# Patient Record
Sex: FEMALE | Race: WHITE | NOT HISPANIC OR LATINO | Employment: FULL TIME | URBAN - METROPOLITAN AREA
[De-identification: names, ages, dates, MRNs, and addresses within clinical notes are randomized per-mention and may not be internally consistent; named-entity substitution may affect disease eponyms.]

---

## 2017-03-27 ENCOUNTER — ALLIED HEALTH/NURSE VISIT (OUTPATIENT)
Dept: CARDIOLOGY | Facility: CLINIC | Age: 62
End: 2017-03-27
Payer: COMMERCIAL

## 2017-03-27 DIAGNOSIS — Z95.0 CARDIAC PACEMAKER IN SITU: Primary | ICD-10-CM

## 2017-03-27 PROCEDURE — 93294 REM INTERROG EVL PM/LDLS PM: CPT | Performed by: INTERNAL MEDICINE

## 2017-03-27 PROCEDURE — 93296 REM INTERROG EVL PM/IDS: CPT | Performed by: INTERNAL MEDICINE

## 2017-03-27 NOTE — MR AVS SNAPSHOT
After Visit Summary   3/27/2017    Tatiana Skelton    MRN: [id number]           Patient Information     Date Of Birth          1955        Visit Information        Provider Department      3/27/2017 4:15 PM JO TECH1 [facility]        Today's Diagnoses     Cardiac pacemaker in situ    -  1       Follow-ups after your visit        Your next 10 appointments already scheduled     Mar 27, 2017  4:15 PM CDT   Remote PPM Check with JO TECH1   [facility] ([facility])    [address] [place] [id number] [phone]           This appointment is for a remote check of your pacemaker.  This is not an appointment at the office.              Who to contact     If you have questions or need follow up information about today's clinic visit or your schedule please contact [facility] directly at [phone].  Normal or non-critical lab and imaging results will be communicated to you by Wikidothart, letter or phone within 4 business days after the clinic has received the results. If you do not hear from us within 7 days, please contact the clinic through Wikidothart or phone. If you have a critical or abnormal lab result, we will notify you by phone as soon as possible.  Submit refill requests through [employer] or call your pharmacy and they will forward the refill request to us. Please allow 3 business days for your refill to be completed.          Additional Information About Your Visit        MyChart Information     [employer] gives you secure access to your electronic health record. If you see a primary care provider, you can also send messages to your care team and make appointments. If you have questions, please call your primary care clinic.  If you do not have a primary care provider, please call [phone] and they will assist you.        Care EveryWhere ID      This is your Care EveryWhere ID. This could be used by other organizations to access your [facility] medical records  [id number]         Blood Pressure from Last 3 Encounters:   12/07/16 106/62   12/05/16 116/68   05/10/16 100/60    Weight from Last 3 Encounters:   12/07/16 69.4 kg (153 lb)   12/05/16 69.6 kg (153 lb 6.4 oz)   07/25/16 66.2 kg (146 lb)              We Performed the Following     INTERROGATION DEVICE EVAL REMOTE, PACER/ICD (42273)     PM DEVICE INTERROGATE REMOTE (60288)        Primary Care Provider Office Phone # Fax #    Darin Patten PA-C [phone]       [facility] [address] [facility] [address]        Thank you!     Thank you for choosing [facility] [place]  for your care. Our goal is always to provide you with excellent care. Hearing back from our patients is one way we can continue to improve our services. Please take a few minutes to complete the written survey that you may receive in the mail after your visit with us. Thank you!             Your Updated Medication List - Protect others around you: Learn how to safely use, store and throw away your medicines at www.disposemymeds.org.          This list is accurate as of: 3/27/17  2:54 PM.  Always use your most recent med list.                   Brand Name Dispense Instructions for use    ASPIRIN PO      Take 81 mg by mouth daily       calcium carbonate 500 MG tablet    OS-SONIA 500 mg [place]. [place]     Take 500 mg by mouth 2 times daily       cholecalciferol 1000 UNIT tablet    vitamin D     Take 1 tablet (1,000 Units) by mouth daily       mometasone 0.1 % ointment    ELOCON    45 g    Apply sparingly to affected area twice daily as needed.  Do not apply to face.       OMEGA-3 FISH OIL PO          QC WOMENS DAILY MULTIvitamin Tabs          UNABLE TO FIND      MEDICATION NAME: Cranberry tablets

## 2017-03-27 NOTE — PROGRESS NOTES
Medtronic Adapta (D) Remote PPM Device Check  AP: 2 % : <1 %  Mode: AAIR<->DDDR        Presenting Rhythm: AS/VS, rates 80-92bpm  Heart Rate: Adequate rates per histogram  Sensing: Stable    Pacing Threshold: Stable    Impedance: Stable  Battery Status: 11-15 years  Atrial Arrhythmia: 21 mode switch episodes comprising <1% of the time. 1 EGM shows As=Vs for PAT. Longest episode lasted 1 minute 48 seconds. Ventricular rates 60-160bpm with rates >100bpm 60% of the time during mode switch.    Ventricular Arrhythmia: None     Care Plan: F/u PPM Carelink q 3 months. Gave patient results over the phone. Zay,CVT

## 2017-05-08 ENCOUNTER — TRANSFERRED RECORDS (OUTPATIENT)
Dept: HEALTH INFORMATION MANAGEMENT | Facility: CLINIC | Age: 62
End: 2017-05-08

## 2017-06-02 ENCOUNTER — HEALTH MAINTENANCE LETTER (OUTPATIENT)
Age: 62
End: 2017-06-02

## 2017-07-12 ENCOUNTER — DOCUMENTATION ONLY (OUTPATIENT)
Dept: CARDIOLOGY | Facility: CLINIC | Age: 62
End: 2017-07-12

## 2017-07-12 NOTE — PROGRESS NOTES
Patient missed Carelink transmission on 7/3/17. Sent letter 7/5, no response. Sent 1 week letter today

## 2017-10-02 ENCOUNTER — TELEPHONE (OUTPATIENT)
Dept: FAMILY MEDICINE | Facility: OTHER | Age: 62
End: 2017-10-02

## 2017-10-02 NOTE — LETTER
[facility]-[phone]  Mkrerl-[phone]      October 2, 2017    Tatiana Skelton  [address] [address] [place] [address]    Dear Tatiana,    I care about your health and have reviewed your health plan. I have reviewed your medical conditions, medication list, and lab results and am making recommendations based on this review, to better manage your health.    You are in particular need of attention regarding:  -Breast Cancer Screening  -Cervical Cancer Screening  -Wellness (Physical) Visit     I am recommending that you:  -schedule a WELLNESS (Physical) APPOINTMENT with me.       -schedule a MAMMOGRAM which is due.    1 in 8 women will develop invasive breast cancer during her lifetime and it is the most common non-skin cancer in American women.  EARLY detection, new treatments, and a better understanding of the disease have increased survival rates - the 5 year survival rate in the 1960s was 63% and today it is close to 90%.    If you are under/uninsured, we recommend you contact the [facility]. They offer mammograms at no charge or on a sliding fee charge. You can schedule with them at [phone]. Please have them send us the results.      Please disregard this reminder if you have had this exam elsewhere within the last year.  It would be helpful for us to have a copy of your mammogram report in your file so that we can best coordinate your care - please contact us with when your test was done so we can update your record.             -schedule a PAP SMEAR EXAM which is due.  Please disregard this reminder if you have had this exam elsewhere within the last year.  It would be helpful for us to have a copy of your recent pap smear report in our file so that we can best coordinate your care.    If you are under/uninsured, we recommend you contact the [facility]. They offer pap smears at no charge or on a sliding fee charge. You can schedule with them at  [phone]. Please have them send us the results.      Here is a list of Health Maintenance topics that are due now or due soon:  Health Maintenance Due   Topic Date Due     YOAN QUESTIONNAIRE 1 YEAR  06/23/1975     Hepatitis C Screening  06/23/1975     Discuss Advance Directive Planning  06/23/2012     Eye Exam - yearly  10/22/2014     Depression Assessment - yearly  08/03/2016     Mammogram - every 2 years  09/26/2016     Pap Smear (diagnostic) - yearly  05/02/2017     Flu Vaccine - yearly  09/01/2017       Please call us at [phone] (or use [employer]) to address the above recommendations.     Thank you for trusting [facility] and we appreciate the opportunity to serve you.  We look forward to supporting your healthcare needs in the future.    Healthy Regards,    [facility]

## 2017-10-02 NOTE — TELEPHONE ENCOUNTER
Panel Management Review      Patient has the following on her problem list: None      Composite cancer screening  Chart review shows that this patient is due/due soon for the following Pap Smear and Mammogram  Summary:    Patient is due/failing the following:   MAMMOGRAM, PAP and PHQ9    Action needed:   Patient needs office visit for physical w/pap, mammo, PHQ-9, YOAN.    Type of outreach:    [facility]. [facility]    Questions for provider review:    None                                                                                                                                    ................Anoop Sebastian LPN,   October 2, 2017,      7:39 AM,   [facility]      Chart routed to closed .

## 2017-11-08 ENCOUNTER — TELEPHONE (OUTPATIENT)
Dept: FAMILY MEDICINE | Facility: OTHER | Age: 62
End: 2017-11-08

## 2017-11-08 NOTE — TELEPHONE ENCOUNTER
Pt is past due for f/u pap smear since failing to do recommended CKC.  Reminder letter was sent 03/15/17.  LMTC and schedule at [facility].  Left this writer's number in case of questions ([phone]).  If no reply and/or appt within 2 weeks (11/22/17) pt will be considered lost to pap tracking f/u.  Crista Crandall,    Pap Tracking

## 2017-11-10 ENCOUNTER — HEALTH MAINTENANCE LETTER (OUTPATIENT)
Age: 62
End: 2017-11-10

## 2017-11-27 ENCOUNTER — OFFICE VISIT (OUTPATIENT)
Dept: FAMILY MEDICINE | Facility: OTHER | Age: 62
End: 2017-11-27
Payer: COMMERCIAL

## 2017-11-27 VITALS
OXYGEN SATURATION: 97 % | SYSTOLIC BLOOD PRESSURE: 106 MMHG | BODY MASS INDEX: 20.86 KG/M2 | HEIGHT: 69 IN | TEMPERATURE: 98.5 F | HEART RATE: 89 BPM | RESPIRATION RATE: 20 BRPM | DIASTOLIC BLOOD PRESSURE: 78 MMHG | WEIGHT: 140.8 LBS

## 2017-11-27 DIAGNOSIS — Z12.4 SCREENING FOR CERVICAL CANCER: ICD-10-CM

## 2017-11-27 DIAGNOSIS — Z00.00 ROUTINE GENERAL MEDICAL EXAMINATION AT A HEALTH CARE FACILITY: Primary | ICD-10-CM

## 2017-11-27 DIAGNOSIS — Z13.1 SCREENING FOR DIABETES MELLITUS: ICD-10-CM

## 2017-11-27 DIAGNOSIS — Z23 NEED FOR PROPHYLACTIC VACCINATION AND INOCULATION AGAINST INFLUENZA: ICD-10-CM

## 2017-11-27 DIAGNOSIS — M54.2 NECK PAIN: ICD-10-CM

## 2017-11-27 DIAGNOSIS — G89.29 CHRONIC LEFT SHOULDER PAIN: ICD-10-CM

## 2017-11-27 DIAGNOSIS — M25.512 CHRONIC LEFT SHOULDER PAIN: ICD-10-CM

## 2017-11-27 DIAGNOSIS — Z11.59 NEED FOR HEPATITIS C SCREENING TEST: ICD-10-CM

## 2017-11-27 DIAGNOSIS — E78.5 HYPERLIPIDEMIA LDL GOAL <100: ICD-10-CM

## 2017-11-27 PROCEDURE — 99396 PREV VISIT EST AGE 40-64: CPT | Mod: 25 | Performed by: NURSE PRACTITIONER

## 2017-11-27 PROCEDURE — G0476 HPV COMBO ASSAY CA SCREEN: HCPCS | Performed by: NURSE PRACTITIONER

## 2017-11-27 PROCEDURE — 90686 IIV4 VACC NO PRSV 0.5 ML IM: CPT | Performed by: NURSE PRACTITIONER

## 2017-11-27 PROCEDURE — 90471 IMMUNIZATION ADMIN: CPT | Performed by: NURSE PRACTITIONER

## 2017-11-27 PROCEDURE — 87624 HPV HI-RISK TYP POOLED RSLT: CPT | Performed by: NURSE PRACTITIONER

## 2017-11-27 PROCEDURE — G0145 SCR C/V CYTO,THINLAYER,RESCR: HCPCS | Performed by: NURSE PRACTITIONER

## 2017-11-27 ASSESSMENT — PATIENT HEALTH QUESTIONNAIRE - PHQ9
SUM OF ALL RESPONSES TO PHQ QUESTIONS 1-9: 17
SUM OF ALL RESPONSES TO PHQ QUESTIONS 1-9: 17
10. IF YOU CHECKED OFF ANY PROBLEMS, HOW DIFFICULT HAVE THESE PROBLEMS MADE IT FOR YOU TO DO YOUR WORK, TAKE CARE OF THINGS AT HOME, OR GET ALONG WITH OTHER PEOPLE: SOMEWHAT DIFFICULT
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
## 2017-11-27 NOTE — LETTER
December 8, 2017    Tatiana Skelton  [address] [address] [place] [address]    Dear Tatiana,  We are happy to inform you that your PAP smear result from 11/27/17 is normal.  We are now able to do a follow up test on PAP smears. The DNA test is for HPV (Human Papilloma Virus). Cervical cancer is closely linked with certain types of HPV. Your result showed no evidence of high risk HPV.  Therefore we recommend you return in 1 year for your next pap smear and HPV test.  You will also need to return to the clinic every year for an annual exam and other preventive tests.  Please contact the clinic at [phone] with any questions.  Sincerely,    HERMILA Rasheed CNP/[facility]

## 2017-11-27 NOTE — NURSING NOTE
Prior to injection verified patient identity using patient's name and date of birth.  Per orders of Meryl Dye APRN CNP , injection(s) given by Anoop Sebastian LPN. Patient instructed to remain in clinic for 15 minutes afterwards, and to report any adverse reaction to me immediately. VIS given.  ................Anoop Sebastian LPN,   November 27, 2017,      4:33 PM,   [facility]

## 2017-11-27 NOTE — NURSING NOTE
"Chief Complaint   Patient presents with     Physical     Health Maintenance     pap, flu shot, edie, phq-9       Initial /78  Pulse 89  Temp 98.5  F (36.9  C) (Tympanic)  Resp 20  Ht 5' 8.5\" (1.74 m)  Wt 140 lb 12.8 oz (63.9 kg)  SpO2 97%  Breastfeeding? No  BMI 21.1 kg/m2 Estimated body mass index is 21.1 kg/(m^2) as calculated from the following:    Height as of this encounter: 5' 8.5\" (1.74 m).    Weight as of this encounter: 140 lb 12.8 oz (63.9 kg).  Medication Reconciliation: complete   ................Anoop Sebastian LPN,   November 27, 2017,      3:44 PM,   [facility]    "

## 2017-11-27 NOTE — PATIENT INSTRUCTIONS
The results of the pap test take about 2 weeks to come back.  We will send a letter with these results and the recommendations for further pap tests in the future.     Come back for fasting labs.     Have your mammogram in [place].     See Dr. Marte in [place] about your neck and shoulder       Preventive Health Recommendations  Female Ages 50 - 64    Yearly exam: See your health care provider every year in order to  o Review health changes.   o Discuss preventive care.    o Review your medicines if your doctor has prescribed any.      Get a Pap test every three years (unless you have an abnormal result and your provider advises testing more often).    If you get Pap tests with HPV test, you only need to test every 5 years, unless you have an abnormal result.     You do not need a Pap test if your uterus was removed (hysterectomy) and you have not had cancer.    You should be tested each year for STDs (sexually transmitted diseases) if you're at risk.     Have a mammogram every 1 to 2 years.    Have a colonoscopy at age 50, or have a yearly FIT test (stool test). These exams screen for colon cancer.      Have a cholesterol test every 5 years, or more often if advised.    Have a diabetes test (fasting glucose) every three years. If you are at risk for diabetes, you should have this test more often.     If you are at risk for osteoporosis (brittle bone disease), think about having a bone density scan (DEXA).    Shots: Get a flu shot each year. Get a tetanus shot every 10 years.    Nutrition:     Eat at least 5 servings of fruits and vegetables each day.    Eat whole-grain bread, whole-wheat pasta and brown rice instead of white grains and rice.    Talk to your provider about Calcium and Vitamin D.     Lifestyle    Exercise at least 150 minutes a week (30 minutes a day, 5 days a week). This will help you control your weight and prevent disease.    Limit alcohol to one drink per day.    No smoking.     Wear  sunscreen to prevent skin cancer.     See your dentist every six months for an exam and cleaning.    See your eye doctor every 1 to 2 years.

## 2017-11-27 NOTE — MR AVS SNAPSHOT
After Visit Summary   11/27/2017    Tatiana Skelton    MRN: [id number]           Patient Information     Date Of Birth          1955        Visit Information        Provider Department      11/27/2017 3:40 PM Meryl Dye APRN [facility]        Today's Diagnoses     Routine general medical examination at a health care facility    -  1    Need for prophylactic vaccination and inoculation against influenza        Neck pain        Chronic left shoulder pain        Screening for cervical cancer        Hyperlipidemia LDL goal <100        Screening for diabetes mellitus        Need for hepatitis C screening test          Care Instructions    The results of the pap test take about 2 weeks to come back.  We will send a letter with these results and the recommendations for further pap tests in the future.     Come back for fasting labs.     Have your mammogram in [place].     See Dr. Marte in [place] about your neck and shoulder       Preventive Health Recommendations  Female Ages 50 - 64    Yearly exam: See your health care provider every year in order to  o Review health changes.   o Discuss preventive care.    o Review your medicines if your doctor has prescribed any.      Get a Pap test every three years (unless you have an abnormal result and your provider advises testing more often).    If you get Pap tests with HPV test, you only need to test every 5 years, unless you have an abnormal result.     You do not need a Pap test if your uterus was removed (hysterectomy) and you have not had cancer.    You should be tested each year for STDs (sexually transmitted diseases) if you're at risk.     Have a mammogram every 1 to 2 years.    Have a colonoscopy at age 50, or have a yearly FIT test (stool test). These exams screen for colon cancer.      Have a cholesterol test every 5 years, or more often if advised.    Have a diabetes test (fasting glucose) every three years. If you  are at risk for diabetes, you should have this test more often.     If you are at risk for osteoporosis (brittle bone disease), think about having a bone density scan (DEXA).    Shots: Get a flu shot each year. Get a tetanus shot every 10 years.    Nutrition:     Eat at least 5 servings of fruits and vegetables each day.    Eat whole-grain bread, whole-wheat pasta and brown rice instead of white grains and rice.    Talk to your provider about Calcium and Vitamin D.     Lifestyle    Exercise at least 150 minutes a week (30 minutes a day, 5 days a week). This will help you control your weight and prevent disease.    Limit alcohol to one drink per day.    No smoking.     Wear sunscreen to prevent skin cancer.     See your dentist every six months for an exam and cleaning.    See your eye doctor every 1 to 2 years.            Follow-ups after your visit        Additional Services     SPORTS MEDICINE REFERRAL       Your provider has referred you to:  [facility] [phone]   [email or web address]    Please be aware that coverage of these services is subject to the terms and limitations of your health insurance plan.  Call member services at your health plan with any benefit or coverage questions.      Please bring the following to your appointment:    >>   Any x-rays, CTs or MRIs which have been performed.  Contact the facility where they were done to arrange for  prior to your scheduled appointment.    >>   List of current medications   >>   This referral request   >>   Any documents/labs given to you for this referral                  Future tests that were ordered for you today     Open Future Orders        Priority Expected Expires Ordered    Lipid panel reflex to direct LDL Fasting Routine  11/27/2018 11/27/2017    Glucose Routine  11/27/2018 11/27/2017    Vitamin D Deficiency Routine  11/27/2018 11/27/2017    Hepatitis C Screen Reflex to HCV RNA  "Quant and Genotype Routine  11/27/2018 11/27/2017            Who to contact     If you have questions or need follow up information about today's clinic visit or your schedule please contact [facility] directly at [phone].  Normal or non-critical lab and imaging results will be communicated to you by [employer], letter or phone within 4 business days after the clinic has received the results. If you do not hear from us within 7 days, please contact the clinic through [employer] or phone. If you have a critical or abnormal lab result, we will notify you by phone as soon as possible.  Submit refill requests through [employer] or call your pharmacy and they will forward the refill request to us. Please allow 3 business days for your refill to be completed.          Additional Information About Your Visit        [employer] Information     [employer] gives you secure access to your electronic health record. If you see a primary care provider, you can also send messages to your care team and make appointments. If you have questions, please call your primary care clinic.  If you do not have a primary care provider, please call [phone] and they will assist you.        Care EveryWhere ID     This is your Care EveryWhere ID. This could be used by other organizations to access your [facility] medical records  [id number]        Your Vitals Were     Pulse Temperature Respirations Height Pulse Oximetry Breastfeeding?    89 98.5  F (36.9  C) (Tympanic) 20 5' 8.5\" (1.74 m) 97% No    BMI (Body Mass Index)                   21.1 kg/m2            Blood Pressure from Last 3 Encounters:   11/27/17 106/78   12/07/16 106/62   12/05/16 116/68    Weight from Last 3 Encounters:   11/27/17 140 lb 12.8 oz (63.9 kg)   12/07/16 153 lb (69.4 kg)   12/05/16 153 lb 6.4 oz (69.6 kg)              We Performed the Following     FLU VAC, SPLIT VIRUS IM > 3 YO (QUADRIVALENT) [93842]     HPV High Risk Types DNA Cervical     Pap imaged thin layer " screen with HPV - recommended age 30 - 65     SPORTS MEDICINE REFERRAL     Vaccine Administration, Initial [25239]        Primary Care Provider Office Phone # Fax #    Darin Patten PA-C [phone]       [address] [facility] [address]        Equal Access to Services     JOCELYN RODAS : Kamla guillermo hadasho Soomaali, waaxda luqadaha, qaybta kaalmada adeegyada, waxseth lamberthelenejoanna peña. So [facility] [phone].    ATENCIÓN: Si habla español, tiene a reyes disposición servicios gratuitos de asistencia lingüística. Llame al [phone].    We comply with applicable federal civil rights laws and [place] laws. We do not discriminate on the basis of race, color, national origin, age, disability, sex, sexual orientation, or gender identity.            Thank you!     Thank you for choosing [facility]  for your care. Our goal is always to provide you with excellent care. Hearing back from our patients is one way we can continue to improve our services. Please take a few minutes to complete the written survey that you may receive in the mail after your visit with us. Thank you!             Your Updated Medication List - Protect others around you: Learn how to safely use, store and throw away your medicines at www.disposemymeds.org.          This list is accurate as of: 11/27/17  4:22 PM.  Always use your most recent med list.                   Brand Name Dispense Instructions for use Diagnosis    ASPIRIN PO      Take 81 mg by mouth daily        calcium carbonate 1250 MG tablet    OS-SONIA 500 mg [place]. [place]     Take 500 mg by mouth 2 times daily        cholecalciferol 1000 UNIT tablet    vitamin D3     Take 1 tablet (1,000 Units) by mouth daily        mometasone 0.1 % ointment    ELOCON    45 g    Apply sparingly to affected area twice daily as needed.  Do not apply to face.    Chronic diffuse otitis externa of left ear, Psoriasis       OMEGA-3 FISH OIL PO           QC WOMENS DAILY  MULTIvitamin Tabs           UNABLE TO FIND      MEDICATION NAME: Cranberry tablets

## 2017-11-28 ASSESSMENT — PATIENT HEALTH QUESTIONNAIRE - PHQ9: SUM OF ALL RESPONSES TO PHQ QUESTIONS 1-9: 17

## 2017-12-01 LAB
COPATH REPORT: NORMAL
PAP: NORMAL

## 2017-12-04 LAB
FINAL DIAGNOSIS: NORMAL
HPV HR 12 DNA CVX QL NAA+PROBE: NEGATIVE
HPV16 DNA SPEC QL NAA+PROBE: NEGATIVE
HPV18 DNA SPEC QL NAA+PROBE: NEGATIVE
SPECIMEN DESCRIPTION: NORMAL

## 2017-12-11 ENCOUNTER — OFFICE VISIT (OUTPATIENT)
Dept: NEUROSURGERY | Facility: CLINIC | Age: 62
End: 2017-12-11
Payer: COMMERCIAL

## 2017-12-11 VITALS — WEIGHT: 144.6 LBS | HEIGHT: 69 IN | BODY MASS INDEX: 21.42 KG/M2 | TEMPERATURE: 97.9 F

## 2017-12-11 DIAGNOSIS — M54.2 CERVICALGIA: Primary | ICD-10-CM

## 2017-12-11 PROCEDURE — 99244 OFF/OP CNSLTJ NEW/EST MOD 40: CPT | Performed by: PHYSICIAN ASSISTANT

## 2017-12-11 ASSESSMENT — PAIN SCALES - GENERAL: PAINLEVEL: SEVERE PAIN (6)
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
## 2017-12-11 NOTE — MR AVS SNAPSHOT
After Visit Summary   12/11/2017    Tatiana Skelton    MRN: [id number]           Patient Information     Date Of Birth          1955        Visit Information        Provider Department      12/11/2017 2:10 PM Richard Tony PA-C [facility]        Today's Diagnoses     Cervicalgia    -  1       Follow-ups after your visit        Your next 10 appointments already scheduled     Jan 18, 2018  8:15 AM CST   Return Visit with CARDIO DEVICE NURSE   [facility] ([facility])    [address] [place] [address] [id number] [phone]            Jan 18, 2018  9:15 AM CST   Return Visit with HERMILA Garcia CNP   [facility])    [address] [place] [address] [id number] [phone]              Future tests that were ordered for you today     Open Future Orders        Priority Expected Expires Ordered    CT Cervical Spine w/o Contrast Routine  12/11/2018 12/11/2017            Who to contact     If you have questions or need follow up information about today's clinic visit or your schedule please contact [facility] directly at [phone].  Normal or non-critical lab and imaging results will be communicated to you by MyChart, letter or phone within 4 business days after the clinic has received the results. If you do not hear from us within 7 days, please contact the clinic through [employer] or phone. If you have a critical or abnormal lab result, we will notify you by phone as soon as possible.  Submit refill requests through [employer] or call your pharmacy and they will forward the refill request to us. Please allow 3 business days for your refill to be completed.          Additional Information About Your Visit        MyChart Information     [employer] gives you secure access to your electronic health record. If you see a primary care  "provider, you can also send messages to your care team and make appointments. If you have questions, please call your primary care clinic.  If you do not have a primary care provider, please call [phone] and they will assist you.        Care EveryWhere ID     This is your Care EveryWhere ID. This could be used by other organizations to access your [facility] medical records  [id number]        Your Vitals Were     Temperature Height BMI (Body Mass Index)             97.9  F (36.6  C) (Temporal) 5' 8.5\" (1.74 m) 21.67 kg/m2          Blood Pressure from Last 3 Encounters:   11/27/17 106/78   12/07/16 106/62   12/05/16 116/68    Weight from Last 3 Encounters:   12/11/17 144 lb 9.6 oz (65.6 kg)   11/27/17 140 lb 12.8 oz (63.9 kg)   12/07/16 153 lb (69.4 kg)               Primary Care Provider Office Phone # Fax #    Darin Patten PA-C [phone]       [place] [address]        Equal Access to Services     JOCELYN RODAS AH: Hadii aad ku hadasho Soomaali, waaxda luqadaha, qaybta kaalmada adeegyada, danilo farr haywandan nehemias may . So [facility] [phone].    ATENCIÓN: Si habla español, tiene a reyes disposición servicios gratuitos de asistencia lingüística. Llame al [phone].    We comply with applicable federal civil rights laws and [place] laws. We do not discriminate on the basis of race, color, national origin, age, disability, sex, sexual orientation, or gender identity.            Thank you!     Thank you for choosing [facility]  for your care. Our goal is always to provide you with excellent care. Hearing back from our patients is one way we can continue to improve our services. Please take a few minutes to complete the written survey that you may receive in the mail after your visit with us. Thank you!             Your Updated Medication List - Protect others around you: Learn how to safely use, store and throw away your medicines at " www.disposemymeds.org.          This list is accurate as of: 12/11/17  2:45 PM.  Always use your most recent med list.                   Brand Name Dispense Instructions for use Diagnosis    ASPIRIN PO      Take 81 mg by mouth daily        calcium carbonate 1250 MG tablet    OS-SONIA 500 mg [place]. Ca     Take 500 mg by mouth 2 times daily        cholecalciferol 1000 UNIT tablet    vitamin D3     Take 1 tablet (1,000 Units) by mouth daily        mometasone 0.1 % ointment    ELOCON    45 g    Apply sparingly to affected area twice daily as needed.  Do not apply to face.    Chronic diffuse otitis externa of left ear, Psoriasis       OMEGA-3 FISH OIL PO           QC WOMENS DAILY MULTIvitamin Tabs           UNABLE TO FIND      MEDICATION NAME: Cranberry tablets

## 2017-12-11 NOTE — PROGRESS NOTES
"Tatiana Skelton is a 62 year old female who presents for:  Chief Complaint   Patient presents with     Neurologic Problem     Neck pain        Initial Vitals:  Temp 97.9  F (36.6  C) (Temporal)  Ht 5' 8.5\" (1.74 m)  Wt 144 lb 9.6 oz (65.6 kg)  BMI 21.67 kg/m2 Estimated body mass index is 21.67 kg/(m^2) as calculated from the following:    Height as of this encounter: 5' 8.5\" (1.74 m).    Weight as of this encounter: 144 lb 9.6 oz (65.6 kg).. Body surface area is 1.78 meters squared. BP completed using cuff size: NA (Not Taken)  Severe Pain (6)    Do you feel safe in your environment?  Yes  Do you need any refills today? No    Nursing Comments:         Katie Steven CMA    "
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
## 2017-12-20 ENCOUNTER — TELEPHONE (OUTPATIENT)
Dept: CARDIOLOGY | Facility: CLINIC | Age: 62
End: 2017-12-20

## 2017-12-20 NOTE — TELEPHONE ENCOUNTER
[facility] Call Center    Phone Message    Name of Caller: Tatiana    Phone Number: Home number on file [phone] (home)    Best time to return call: Any    May a detailed message be left on voicemail: yes    Relation to patient: Self    Reason for Call: Patient wanting to get into see Dr. Elmore prior to the new year as insurance Is changing. This is just for a routine follow up. She is requesting a Friday, Tuesday or Wednesday. She would like a call back if you are able to work her into the schedule. Please Advise. Thank you.    Action Taken: Message routed to:  Adult Clinics: Cardiology p [id number]

## 2017-12-20 NOTE — TELEPHONE ENCOUNTER
Left message for patient that today was the only day that Dr Elmore was in the clinic before the end of the year. There are no openings this afternoon. Left phone number for the [facility] scheduling phone number to check on availability.

## 2017-12-21 ENCOUNTER — TELEPHONE (OUTPATIENT)
Dept: FAMILY MEDICINE | Facility: OTHER | Age: 62
End: 2017-12-21

## 2017-12-21 ENCOUNTER — HOSPITAL ENCOUNTER (OUTPATIENT)
Dept: CT IMAGING | Facility: CLINIC | Age: 62
Discharge: HOME | End: 2017-12-21
Attending: PHYSICIAN ASSISTANT | Admitting: PHYSICIAN ASSISTANT
Payer: COMMERCIAL

## 2017-12-21 ENCOUNTER — TELEPHONE (OUTPATIENT)
Dept: SURGERY | Facility: CLINIC | Age: 62
End: 2017-12-21

## 2017-12-21 DIAGNOSIS — Z11.59 NEED FOR HEPATITIS C SCREENING TEST: ICD-10-CM

## 2017-12-21 DIAGNOSIS — E78.5 HYPERLIPIDEMIA LDL GOAL <100: ICD-10-CM

## 2017-12-21 DIAGNOSIS — Z12.11 SPECIAL SCREENING FOR MALIGNANT NEOPLASMS, COLON: Primary | ICD-10-CM

## 2017-12-21 DIAGNOSIS — M54.2 CERVICALGIA: ICD-10-CM

## 2017-12-21 DIAGNOSIS — Z13.1 SCREENING FOR DIABETES MELLITUS: ICD-10-CM

## 2017-12-21 LAB
CHOLEST SERPL-MCNC: 239 MG/DL
DEPRECATED CALCIDIOL+CALCIFEROL SERPL-MC: 28 UG/L (ref 20–75)
GLUCOSE SERPL-MCNC: 85 MG/DL (ref 70–99)
HCV AB SERPL QL IA: NONREACTIVE
HDLC SERPL-MCNC: 107 MG/DL
LDLC SERPL CALC-MCNC: 119 MG/DL
NONHDLC SERPL-MCNC: 132 MG/DL
TRIGL SERPL-MCNC: 63 MG/DL

## 2017-12-21 PROCEDURE — 80061 LIPID PANEL: CPT | Performed by: NURSE PRACTITIONER

## 2017-12-21 PROCEDURE — G0472 HEP C SCREEN HIGH RISK/OTHER: HCPCS | Performed by: NURSE PRACTITIONER

## 2017-12-21 PROCEDURE — 72125 CT NECK SPINE W/O DYE: CPT

## 2017-12-21 PROCEDURE — 36415 COLL VENOUS BLD VENIPUNCTURE: CPT | Performed by: NURSE PRACTITIONER

## 2017-12-21 PROCEDURE — 82947 ASSAY GLUCOSE BLOOD QUANT: CPT | Performed by: NURSE PRACTITIONER

## 2017-12-21 PROCEDURE — 82306 VITAMIN D 25 HYDROXY: CPT | Performed by: NURSE PRACTITIONER

## 2017-12-21 NOTE — TELEPHONE ENCOUNTER
Pt called to schedule Colonoscopy. Orders placed by Tammy. Dx: Screening. Pt is sched 12/29/2017 @ 315 w/lemuel Hurt @ 215. Please mail prep

## 2017-12-21 NOTE — TELEPHONE ENCOUNTER
Patient calling would like to get an additional order for the colonoscopy to be done at [facility] in [place], in case she can get in there with a different doctor and would not have to be put under and would like it done before that end of the year, for insurance reasons. Please call and advise.

## 2017-12-21 NOTE — TELEPHONE ENCOUNTER
Referral printed off and left at  for pt to  if needed, I notified pt of this. Reba Mejia MA     12/21/2017

## 2017-12-21 NOTE — TELEPHONE ENCOUNTER
Patient returned call. She is wondering if it would be possible for Meryl Marnie to give her a call back when she has a chance. She can be reached at [phone]. Please advise.     Thank you  Sameer Mendoza  Patient Representative

## 2017-12-21 NOTE — TELEPHONE ENCOUNTER
Pt wants to have the prep emailed to her if possible. Her e-mail is [email or web address]

## 2017-12-21 NOTE — TELEPHONE ENCOUNTER
Reason for Call: Request for an order or referral:    Order or referral being requested: Patient is requesting an order for a colonoscopy.  Patient is requesting the order today as she'd like it done before the end of the year.  Please advise patient as soon as possible    Date needed: as soon as possible    Has the patient been seen by the PCP for this problem? YES    Additional comments:     Phone number Patient can be reached at:  Home number on file [phone] (home)    Best Time:      Can we leave a detailed message on this number?  YES    Call taken on 12/21/2017 at 7:33 AM by Soraya Peck

## 2017-12-21 NOTE — TELEPHONE ENCOUNTER
I called and notified pt that order was placed. She is also trying to schedule a CT also but she plans to call and schedule these on her own. She did not express any further concerns. Reba Mejia MA     12/21/2017

## 2017-12-22 ENCOUNTER — TELEPHONE (OUTPATIENT)
Dept: NEUROSURGERY | Facility: CLINIC | Age: 62
End: 2017-12-22

## 2017-12-22 DIAGNOSIS — M54.2 CERVICALGIA: Primary | ICD-10-CM

## 2017-12-22 NOTE — TELEPHONE ENCOUNTER
Results communicated to Jaimie via phone.  Referral forwarded to [facility] for Pain management consultation.  Jaimie has no further questions at this time.  She will contact us again if symptoms change or worsen.

## 2017-12-22 NOTE — PROGRESS NOTES
Pt notified of lab results via RingCaptcha msg.  ................Anoop Sebastian LPN,   December 22, 2017,      7:36 AM,   [facility]

## 2017-12-22 NOTE — TELEPHONE ENCOUNTER
Patient insurance will be having a scar deductable at the beginging of the year and is wanting to go over imaging if possible or schedule an appointment before the year is over.

## 2017-12-22 NOTE — TELEPHONE ENCOUNTER
Message left for Jaimie.  CT scan has been reviewed by AUGUSTO Tristan.  CT does not demonstrate any significant foraminal or central stenosis.  We would offer referral to pain management service if patient has not yet established.  Also confirmed that per Epic records her Pacemaker which was replaced at the [facility] in June of 2012 is a model ADDRL1 - per [employer] web site this device does not appear to be MRI compatible.  Will await a return call from Jaimie to discuss if she would like to proceed with pain management referral.

## 2017-12-22 NOTE — TELEPHONE ENCOUNTER
Spoke to Jaimie via phone.  She has not yet been able to establish whether or not her Medtronic pacemaker device is MRI compatible.  She did decide to move forward with CT in the meantime which she completed yestereday.  She is calling for results and treatment recommendations.  This will be routed to her care team for review.

## 2018-01-31 ENCOUNTER — DOCUMENTATION ONLY (OUTPATIENT)
Dept: CARDIOLOGY | Facility: CLINIC | Age: 63
End: 2018-01-31

## 2018-07-09 ENCOUNTER — TELEPHONE (OUTPATIENT)
Dept: CARDIOLOGY | Facility: CLINIC | Age: 63
End: 2018-07-09

## 2018-07-09 ENCOUNTER — TELEPHONE (OUTPATIENT)
Dept: FAMILY MEDICINE | Facility: CLINIC | Age: 63
End: 2018-07-09

## 2018-07-09 NOTE — TELEPHONE ENCOUNTER
Date of colonoscopy/EGD: 07/23/18 1:45  Surgeon: Dr. Hurt  Prep:Miralax  Packet:Colonoscopy/EGD instructions mailed to patient's home address.   Date: 7/9/2018      Surgery Scheduler

## 2018-07-09 NOTE — TELEPHONE ENCOUNTER
Health Call Center    Phone Message: Tatiana  Phone: [phone]    May a detailed message be left on voicemail: yes    Reason for Call: Tatiana called and said she is receiving letters regarding a device follow up and would like a call to discuss.  Thank you.     Action Taken: Message routed to:  Adult Clinics: Cardiology p [id number]

## 2018-07-09 NOTE — TELEPHONE ENCOUNTER
Patient is scheduled to follow up with device nurse and Dr. Stout on 8/30 in [place]. Patient's insurance switched and was not able to follow up in the expected time frame but is going to keep her appointments on 8/30 in [place]. Patient had moved from [place] to [place] and [place] location is closer for her.

## 2018-07-16 ENCOUNTER — TELEPHONE (OUTPATIENT)
Dept: SURGERY | Facility: CLINIC | Age: 63
End: 2018-07-16

## 2018-07-16 NOTE — TELEPHONE ENCOUNTER
Reason for Call:  Other call back    Detailed comments: Patient would like a call to reschedule her colonoscopy -     Phone Number Patient can be reached at: Home number on file [phone] (home)    Best Time: asap     Can we leave a detailed message on this number? YES    Call taken on 7/16/2018 at 9:55 AM by Susan Jo

## 2018-08-20 ENCOUNTER — SURGERY (OUTPATIENT)
Age: 63
End: 2018-08-20

## 2018-08-20 ENCOUNTER — HOSPITAL ENCOUNTER (OUTPATIENT)
Facility: CLINIC | Age: 63
Discharge: HOME | End: 2018-08-20
Attending: SURGERY | Admitting: SURGERY
Payer: COMMERCIAL

## 2018-08-20 ENCOUNTER — ANESTHESIA EVENT (OUTPATIENT)
Dept: GASTROENTEROLOGY | Facility: CLINIC | Age: 63
End: 2018-08-20
Payer: COMMERCIAL

## 2018-08-20 ENCOUNTER — ANESTHESIA (OUTPATIENT)
Dept: GASTROENTEROLOGY | Facility: CLINIC | Age: 63
End: 2018-08-20
Payer: COMMERCIAL

## 2018-08-20 VITALS
OXYGEN SATURATION: 98 % | SYSTOLIC BLOOD PRESSURE: 128 MMHG | RESPIRATION RATE: 16 BRPM | HEART RATE: 64 BPM | DIASTOLIC BLOOD PRESSURE: 70 MMHG

## 2018-08-20 LAB — COLONOSCOPY: NORMAL

## 2018-08-20 PROCEDURE — 25000125 ZZHC RX 250: Performed by: NURSE ANESTHETIST, CERTIFIED REGISTERED

## 2018-08-20 PROCEDURE — 25000128 H RX IP 250 OP 636: Performed by: NURSE ANESTHETIST, CERTIFIED REGISTERED

## 2018-08-20 PROCEDURE — 25000125 ZZHC RX 250: Performed by: SURGERY

## 2018-08-20 PROCEDURE — 40000296 ZZH STATISTIC ENDO RECOVERY CLASS 1:2 FIRST HOUR: Performed by: SURGERY

## 2018-08-20 PROCEDURE — G0121 COLON CA SCRN NOT HI RSK IND: HCPCS | Performed by: SURGERY

## 2018-08-20 PROCEDURE — 45378 DIAGNOSTIC COLONOSCOPY: CPT | Performed by: SURGERY

## 2018-08-20 RX ORDER — ONDANSETRON 4 MG/1
4 TABLET, ORALLY DISINTEGRATING ORAL EVERY 30 MIN PRN
Status: DISCONTINUED | OUTPATIENT
Start: 2018-08-20 | End: 2018-08-20 | Stop reason: HOSPADM

## 2018-08-20 RX ORDER — ONDANSETRON 2 MG/ML
4 INJECTION INTRAMUSCULAR; INTRAVENOUS EVERY 30 MIN PRN
Status: DISCONTINUED | OUTPATIENT
Start: 2018-08-20 | End: 2018-08-20 | Stop reason: HOSPADM

## 2018-08-20 RX ORDER — ONDANSETRON 2 MG/ML
4 INJECTION INTRAMUSCULAR; INTRAVENOUS
Status: DISCONTINUED | OUTPATIENT
Start: 2018-08-20 | End: 2018-08-20 | Stop reason: HOSPADM

## 2018-08-20 RX ORDER — SODIUM CHLORIDE, SODIUM LACTATE, POTASSIUM CHLORIDE, CALCIUM CHLORIDE 600; 310; 30; 20 MG/100ML; MG/100ML; MG/100ML; MG/100ML
INJECTION, SOLUTION INTRAVENOUS CONTINUOUS
Status: DISCONTINUED | OUTPATIENT
Start: 2018-08-20 | End: 2018-08-20 | Stop reason: HOSPADM

## 2018-08-20 RX ORDER — NALOXONE HYDROCHLORIDE 0.4 MG/ML
.1-.4 INJECTION, SOLUTION INTRAMUSCULAR; INTRAVENOUS; SUBCUTANEOUS
Status: DISCONTINUED | OUTPATIENT
Start: 2018-08-20 | End: 2018-08-20 | Stop reason: HOSPADM

## 2018-08-20 RX ORDER — LIDOCAINE 40 MG/G
CREAM TOPICAL
Status: DISCONTINUED | OUTPATIENT
Start: 2018-08-20 | End: 2018-08-20 | Stop reason: HOSPADM

## 2018-08-20 RX ORDER — PROPOFOL 10 MG/ML
INJECTION, EMULSION INTRAVENOUS CONTINUOUS PRN
Status: DISCONTINUED | OUTPATIENT
Start: 2018-08-20 | End: 2018-08-20

## 2018-08-20 RX ORDER — PROPOFOL 10 MG/ML
INJECTION, EMULSION INTRAVENOUS PRN
Status: DISCONTINUED | OUTPATIENT
Start: 2018-08-20 | End: 2018-08-20

## 2018-08-20 RX ORDER — MEPERIDINE HYDROCHLORIDE 25 MG/ML
12.5 INJECTION INTRAMUSCULAR; INTRAVENOUS; SUBCUTANEOUS
Status: DISCONTINUED | OUTPATIENT
Start: 2018-08-20 | End: 2018-08-20 | Stop reason: HOSPADM

## 2018-08-20 RX ADMIN — LIDOCAINE HYDROCHLORIDE 1 ML: 10 INJECTION, SOLUTION EPIDURAL; INFILTRATION; INTRACAUDAL; PERINEURAL at 11:32

## 2018-08-20 RX ADMIN — PROPOFOL 50 MG: 10 INJECTION, EMULSION INTRAVENOUS at 12:22

## 2018-08-20 RX ADMIN — PROPOFOL 50 MG: 10 INJECTION, EMULSION INTRAVENOUS at 12:23

## 2018-08-20 RX ADMIN — PROPOFOL 150 MCG/KG/MIN: 10 INJECTION, EMULSION INTRAVENOUS at 12:23

## 2018-08-20 NOTE — ANESTHESIA CARE TRANSFER NOTE
Patient: Tatiana Skelton    Procedure(s):  COLONOSCOPY - Wound Class: II-Clean Contaminated    Diagnosis: Screening  Diagnosis Additional Information: No value filed.    Anesthesia Type:   MAC     Note:  Airway :Room Air  Patient transferred to:Phase II  Handoff Report: Identifed the Patient, Identified the Reponsible Provider, Reviewed the pertinent medical history, Discussed the surgical course, Reviewed Intra-OP anesthesia mangement and issues during anesthesia, Set expectations for post-procedure period and Allowed opportunity for questions and acknowledgement of understanding      Vitals: (Last set prior to Anesthesia Care Transfer)    CRNA VITALS  8/20/2018 1217 - 8/20/2018 1317      8/20/2018             EKG: NSR                Electronically Signed By: HERMILA Wheatley CRNA  August 20, 2018  4:55 PM

## 2018-08-20 NOTE — IP AVS SNAPSHOT
[facility]    [address] [address]    Phone:  [phone]                                       After Visit Summary   8/20/2018    Tatiana Skelton    MRN: [id number]           After Visit Summary Signature Page     I have received my discharge instructions, and my questions have been answered. I have discussed any challenges I see with this plan with the nurse or doctor.    ..........................................................................................................................................  Patient/Patient Representative Signature      ..........................................................................................................................................  Patient Representative Print Name and Relationship to Patient    ..................................................               ................................................  Date                                            Time    ..........................................................................................................................................  Reviewed by Signature/Title    ...................................................              ..............................................  Date                                                            Time

## 2018-08-20 NOTE — IP AVS SNAPSHOT
MRN:[id number]                      After Visit Summary   8/20/2018    Tatiana Skelton    MRN: [id number]           Thank you!     Thank you for choosing [facility] for your care. Our goal is always to provide you with excellent care. Hearing back from our patients is one way we can continue to improve our services. Please take a few minutes to complete the written survey that you may receive in the mail after you visit with us. Thank you!        Patient Information     Date Of Birth          1955        About your hospital stay     You were admitted on:  August 20, 2018 You last received care in the:  [facility]    You were discharged on:  August 20, 2018       Who to Call     For medical emergencies, please call 911.  For non-urgent questions about your medical care, please call your primary care provider or clinic, [phone]  For questions related to your surgery, please call your surgery clinic        Attending Provider     Provider Specialty    Manas Lee, DO Surgery       Primary Care Provider Office Phone # Fax #    Darin Patten PA-C [phone]      Your next 10 appointments already scheduled     Aug 30, 2018 11:45 AM CDT   Return Visit with CARDIO DEVICE NURSE   [facility]    [address] [place] [id number] [phone]            Aug 30, 2018  1:00 PM CDT   Return Visit with Sha Stout MD   [facility]    [address] [place] [id number] [phone]              Further instructions from your care team         [facility]                                                                    Home Care Following Colonoscopy                                                                                            Activity:      You have just undergone  an endoscopic procedure usually performed with conscious sedation.  Do not work or operate machinery (including a car) for at least 12 hours.      I encourage you to walk and attempt to pass this air as soon as possible.    Diet:    Return to the diet you were on before your procedure but eat lightly for the first 12-24 hours.    Drink plenty of water.    Resume any regular medications unless otherwise advised by your physician.  Please begin any new medication prescribed as a result of your procedure as directed by your physician.     If you had any biopsy or polyp removed please refrain from aspirin or aspirin products for 2 days.  If on Coumadin please restart as instructed by your physician.   Pain:      You may take Tylenol as needed for pain.  Expected Recovery:      You can expect some mild abdominal fullness and/or discomfort due to the air used to inflate your intestinal tract.     Call Your Physician if You Have:      After Colonoscopy:  o Worsening persisting abdominal pain which is worse with activity.  o Fevers (>101 degrees F), chills or shakes.  o Passage of continued blood with bowel movements.   o   Any questions or concerns about your recovery, please call [phone] or after hours [phone] Nurse Advice Line.      Follow-up Care:      Repeat Colonoscopy in 10 years      Pending Results     No orders found from 8/18/2018 to 8/21/2018.            Admission Information     Date & Time Provider Department Dept. Phone    8/20/2018 Manas Lee,  [facility] [phone]      Your Vitals Were     Blood Pressure Pulse Respirations Pulse Oximetry          119/74 64 16 96%        MyChart Information     [facility] gives you secure access to your electronic health record. If you see a primary care provider, you can also send messages to your care team and make appointments. If you have questions, please call your primary care clinic.  If you do not have a primary care  provider, please call [phone] and they will assist you.        Care EveryWhere ID     This is your Care EveryWhere ID. This could be used by other organizations to access your [place] medical records  [id number]        Equal Access to Services     JOCELYN RODAS : Hadii aad ku hadlauro Alcantar, wavernellda luqadaha, qaybta kaalmada le, danilo banksmike ellingtonodilia lowery yadira peña. So [facility] [phone].    ATENCIÓN: Si habla español, tiene a reyes disposición servicios gratuitos de asistencia lingüística. Llame al [phone].    We comply with applicable federal civil rights laws and [place] laws. We do not discriminate on the basis of race, color, national origin, age, disability, sex, sexual orientation, or gender identity.               Review of your medicines      UNREVIEWED medicines. Ask your doctor about these medicines        Dose / Directions    ASPIRIN PO        Dose:  81 mg   Take 81 mg by mouth daily   Refills:  0       calcium carbonate 500 MG tablet   Commonly known as:  OS-SONIA 500 mg [place]. Ca        Dose:  500 mg   Take 500 mg by mouth 2 times daily   Refills:  0       cholecalciferol 1000 UNIT tablet   Commonly known as:  vitamin D3        Dose:  1000 Units   Take 1 tablet (1,000 Units) by mouth daily   Refills:  0       mometasone 0.1 % ointment   Commonly known as:  ELOCON   Used for:  Chronic diffuse otitis externa of left ear, Psoriasis        Apply sparingly to affected area twice daily as needed.  Do not apply to face.   Quantity:  45 g   Refills:  1       OMEGA-3 FISH OIL PO        Refills:  0       QC WOMENS DAILY MULTIvitamin Tabs        Refills:  0       UNABLE TO FIND        MEDICATION NAME: Cranberry tablets   Refills:  0                Protect others around you: Learn how to safely use, store and throw away your medicines at www.disposemymeds.org.             Medication List: This is a list of all your medications and when to take them. Check marks below indicate your daily home  schedule. Keep this list as a reference.      Medications           Morning Afternoon Evening Bedtime As Needed    ASPIRIN PO   Take 81 mg by mouth daily                                calcium carbonate 500 MG tablet   Commonly known as:  OS-SONIA 500 mg [place]. Ca   Take 500 mg by mouth 2 times daily                                cholecalciferol 1000 UNIT tablet   Commonly known as:  vitamin D3   Take 1 tablet (1,000 Units) by mouth daily                                mometasone 0.1 % ointment   Commonly known as:  ELOCON   Apply sparingly to affected area twice daily as needed.  Do not apply to face.                                OMEGA-3 FISH OIL PO                                QC WOMENS DAILY MULTIvitamin Tabs                                UNABLE TO FIND   MEDICATION NAME: Cranberry tablets

## 2018-08-20 NOTE — CONSULTS
[facility]    History and Physical  Surgery     Date of Admission:  8/20/2018    Assessment & Plan   Tatiana Skelton is a 63 year old female who presents for screening colonoscopy    To the OR today for Screening colonoscopy    Risks and benefits discussed in detail.  Consent obtained.      Manas Lee D.O.     Code Status   Full Code    Primary Care Physician   Darin Aden    Chief Complaint   Screening Colonoscopy    History is obtained from the patient    History of Present Illness   Tatiana Skelton is a 63 year old female who presents for screening colonoscopy. No blood in stool.  No abdominal pain.  No constipation or change in stool caliber.  No Family History of Colon Cancer.  Patient reports previous colonoscopy was negative for polyps.     Past Medical History    I have reviewed this patient's medical history and updated it with pertinent information if needed.   Past Medical History:   Diagnosis Date     Cardiac Pacemaker- Medtronic, dual chamber- NOT dependant 6/4/2012     Cervical high risk HPV (human papillomavirus) test positive 10/2/14, 3/6/16     Elevated cholesterol      Generalized osteoarthrosis, unspecified site 1/30/2015     H/O colposcopy with cervical biopsy 6/12/12    SANJU 1     Open angle with borderline findings, low risk 1/9/2014     Papanicolaou smear of cervix with low grade squamous intraepithelial lesion (LGSIL) 5/30/12     Sick sinus syndrome (H)      Syncope        Past Surgical History   I have reviewed this patient's surgical history and updated it with pertinent information if needed.  Past Surgical History:   Procedure Laterality Date     ABDOMEN SURGERY      Tubal     BUNIONECTOMY JACKIE BILATERAL       HC OR IMPLANT BREAST       IMPLANT IMPLANTABLE CARDIOVERTER DEFIBRILLATOR  6/4/2012    she does not have an ICD. She has a pacemker.     IMPLANT PACEMAKER  2002    Dual chamber medtronic for sick sinus snyndrome     TUBAL  LIGATION  2000       Prior to Admission Medications   Prior to Admission Medications   Prescriptions Last Dose Informant Patient Reported? Taking?   ASPIRIN PO Past Month at stopped  Yes Yes   Sig: Take 81 mg by mouth daily   Multiple Vitamins-Minerals (QC WOMENS DAILY MULTIVITAMIN) TABS Past Month  Yes Yes   Omega-3 Fatty Acids (OMEGA-3 FISH OIL PO) Past Month  Yes Yes   UNABLE TO FIND   Yes No   Sig: MEDICATION NAME: Cranberry tablets   calcium carbonate (OS-SONIA 500 MG [place]. CA) 500 MG tablet Past Month  Yes Yes   Sig: Take 500 mg by mouth 2 times daily   cholecalciferol (VITAMIN D) 1000 UNIT tablet Past Month  Yes Yes   Sig: Take 1 tablet (1,000 Units) by mouth daily   mometasone (ELOCON) 0.1 % ointment Past Week  No Yes   Sig: Apply sparingly to affected area twice daily as needed.  Do not apply to face.      Facility-Administered Medications: None     Allergies   Allergies   Allergen Reactions     Versed      Hypersensitive     Morphine Sulfate Anxiety       Social History   I have reviewed this patient's social history and updated it with pertinent information if needed. Tatiana RICHARDSON Costa  reports that she has never smoked. She has never used smokeless tobacco. She reports that she drinks alcohol. She reports that she does not use illicit drugs.    Family History   I have reviewed this patient's family history and updated it with pertinent information if needed.   Family History   Problem Relation Age of Onset     Respiratory Mother 61     COPD     HEART DISEASE Mother      Eye Disorder Mother      cataract surgery     Lipids Mother      Diabetes Mother      Hypertension Mother      Eye Disorder Father      Glaucoma Father 80     takes drops, frequent apt - suspect it is glaucoma     Hypertension Sister      Lipids Brother      Hypertension Brother      Lipids Sister      Cancer No family hx of      Cerebrovascular Disease No family hx of      Thyroid Disease No family hx of      Macular Degeneration No  family hx of        Review of Systems   The 10 point Review of Systems is negative other than noted in the HPI    Physical Exam       BP: 121/76 Pulse: 64   Resp: 16 SpO2: 95 % O2 Device: None (Room air)    Vital Signs with Ranges  Pulse:  [64] 64  Resp:  [16] 16  BP: (121)/(76) 121/76  SpO2:  [95 %] 95 %  0 lbs 0 oz  There is no height or weight on file to calculate BMI.    Constitutional: awake, alert, cooperative, no apparent distress, and appears stated age  Eyes: Lids and lashes normal, pupils equal, round and reactive to light, extra ocular muscles intact, sclera clear, conjunctiva normal  ENT: Normocephalic, without obvious abnormality, atraumatic,  Respiratory: No increased work of breathing, good air exchange, clear to auscultation bilaterally, no crackles or wheezing  Cardiovascular: Normal apical impulse, Paced rhythm,   GI: No scars, normal bowel sounds, soft, non-distended, non-tender, no masses palpated, no hepatosplenomegally  Genitounirinary: deferred  Skin: no bruising or bleeding, normal skin color, texture, turgor and no redness, warmth, or swelling  Musculoskeletal: There is no redness, warmth, or swelling of the joints.  Full range of motion noted.  Motor strength is 5 out of 5 all extremities bilaterally.  Tone is normal.  Neurologic: Awake, alert, oriented to name, place and time.  Cranial nerves II-XII are grossly intact.  Motor is 5 out of 5 bilaterally.   Neuropsychiatric: General: normal, calm and normal eye contact    Data   No results found for this or any previous visit (from the past 24 hour(s)).

## 2018-08-20 NOTE — ANESTHESIA POSTPROCEDURE EVALUATION
Patient: Tatiana Skelton    Procedure(s):  COLONOSCOPY - Wound Class: II-Clean Contaminated    Diagnosis:Screening  Diagnosis Additional Information: No value filed.    Anesthesia Type:  MAC    Note:  Anesthesia Post Evaluation    Patient location during evaluation: Phase 2 and Bedside  Patient participation: Able to fully participate in evaluation  Level of consciousness: awake and alert  Pain management: adequate  Airway patency: patent  Cardiovascular status: acceptable  Respiratory status: acceptable  Hydration status: acceptable  PONV: none     Anesthetic complications: None    Comments: Patient was pleased with her anesthesia care today. She denies any postop op concerns. Vital signs stable. No anesthesia concerns noted. Will follow as needed.        Last vitals:  Vitals:    08/20/18 1310 08/20/18 1315 08/20/18 1330   BP: 119/74 119/74 128/70   Pulse:      Resp: 16     SpO2: 96% 98%          Electronically Signed By: HERMILA Wheatley CRNA  August 20, 2018  4:56 PM

## 2018-08-20 NOTE — DISCHARGE INSTRUCTIONS
[facility]                                                                    Home Care Following Colonoscopy                                                                                            Activity:      You have just undergone an endoscopic procedure usually performed with conscious sedation.  Do not work or operate machinery (including a car) for at least 12 hours.      I encourage you to walk and attempt to pass this air as soon as possible.    Diet:    Return to the diet you were on before your procedure but eat lightly for the first 12-24 hours.    Drink plenty of water.    Resume any regular medications unless otherwise advised by your physician.  Please begin any new medication prescribed as a result of your procedure as directed by your physician.     If you had any biopsy or polyp removed please refrain from aspirin or aspirin products for 2 days.  If on Coumadin please restart as instructed by your physician.   Pain:      You may take Tylenol as needed for pain.  Expected Recovery:      You can expect some mild abdominal fullness and/or discomfort due to the air used to inflate your intestinal tract.     Call Your Physician if You Have:      After Colonoscopy:  o Worsening persisting abdominal pain which is worse with activity.  o Fevers (>101 degrees F), chills or shakes.  o Passage of continued blood with bowel movements.   o   Any questions or concerns about your recovery, please call [phone] or after hours [phone] Nurse Advice Line.      Follow-up Care:      Repeat Colonoscopy in 10 years

## 2018-08-30 ENCOUNTER — OFFICE VISIT (OUTPATIENT)
Dept: CARDIOLOGY | Facility: CLINIC | Age: 63
End: 2018-08-30
Payer: COMMERCIAL

## 2018-08-30 VITALS
BODY MASS INDEX: 21.33 KG/M2 | WEIGHT: 144 LBS | HEIGHT: 69 IN | HEART RATE: 62 BPM | SYSTOLIC BLOOD PRESSURE: 108 MMHG | DIASTOLIC BLOOD PRESSURE: 78 MMHG | OXYGEN SATURATION: 96 %

## 2018-08-30 DIAGNOSIS — I49.5 SINOATRIAL NODE DYSFUNCTION (H): Primary | ICD-10-CM

## 2018-08-30 DIAGNOSIS — Z95.0 CARDIAC PACEMAKER IN SITU: Primary | ICD-10-CM

## 2018-08-30 PROCEDURE — 99214 OFFICE O/P EST MOD 30 MIN: CPT | Performed by: INTERNAL MEDICINE

## 2018-08-30 NOTE — LETTER
2018      Darin Aden PA-C  [address] [facility] [address]      RE: Tatiana RICHARDSON Costa       Dear Colleague,    I had the pleasure of seeing Tatiana Skelton in the [facility].    Service Date: 2018      Darin Aden PA-C    [facility]    [address] [place] [address]      RE: Tatiana Skelton   MRN: [id number]   : 1955      Dear Mr. Aden:       I saw Ms. Skelton for establishment of cardiac care for sinus node dysfunction.  She is a 63-year-old white female with a history of recurrent syncope and was diagnosed to have sinus node dysfunction in .  She received a Medtronic dual-chamber pacemaker implantation in [place], [place] in .  Subsequently, she moved her care to [place].  Her pulse generator was replaced in .  At that time, the new pulse generator was implanted under the pectoral muscle.  She has moved her home to the [facility] and now she would like to have her cardiac care taken care of in [place].  Symptomatically, she has no fatigue, shortness of breath, palpitation or dizziness.  She is working on multiple jobs.      PAST MEDICAL HISTORY:  Remarkable for human papilloma virus infection which is under close monitoring by Gynecology.      PHYSICAL EXAMINATION:   VITAL SIGNS:  Blood pressure was 108/70, heart rate 62 beats per minute, body weight 144 pounds.   HEENT:  Eyes and ENT were unremarkable.   LUNGS:  Clear.   CARDIAC:  Rhythm was regular and heart sounds were normal without murmur.  The pacemaker site looked well.   ABDOMEN:  Examination showed no hepatomegaly.   EXTREMITIES:  There was no pedal edema.      ASSESSMENT AND RECOMMENDATIONS:  Ms. Skelton is doing well symptomatically.  Her pacemaker function is normal.  The blood pressure and heart rate are acceptable.  She does not take cardiac medications, except for a baby aspirin.  She does not need any further testing.  She can return for  Cardiology followup in 1 year.      Sincerely,         WOLF STOUT MD             D: 2018   T: 2018   MT: GAEL      Name:     LENA LANZA   MRN:      [id number]        Account:      [id number]   :      1955           Service Date: 2018      Document: [id number]           Outpatient Encounter Prescriptions as of 2018   Medication Sig Dispense Refill     ASPIRIN PO Take 81 mg by mouth daily       calcium carbonate (OS-SONIA 500 MG [place]. CA) 500 MG tablet Take 500 mg by mouth 2 times daily       cholecalciferol (VITAMIN D) 1000 UNIT tablet Take 1 tablet (1,000 Units) by mouth daily       mometasone (ELOCON) 0.1 % ointment Apply sparingly to affected area twice daily as needed.  Do not apply to face. (Patient not taking: Reported on 2018) 45 g 1     Multiple Vitamins-Minerals (QC WOMENS DAILY MULTIVITAMIN) TABS        Omega-3 Fatty Acids (OMEGA-3 FISH OIL PO)        UNABLE TO FIND MEDICATION NAME: Cranberry tablets       No facility-administered encounter medications on file as of 2018.        Again, thank you for allowing me to participate in the care of your patient.      Sincerely,    Wolf Stout MD     [facility]

## 2018-08-30 NOTE — MR AVS SNAPSHOT
After Visit Summary   8/30/2018    Tatiana Skelton    MRN: [id number]           Patient Information     Date Of Birth          1955        Visit Information        Provider Department      8/30/2018 11:45 AM CARDIO DEVICE NURSE [facility]        Today's Diagnoses     Cardiac pacemaker in situ    -  1       Follow-ups after your visit        Your next 10 appointments already scheduled     Dec 06, 2018 11:45 AM CST   Remote PPM Check with JO TECH1   [facility] ([facility])    [address] [address] [phone] OPT 2           This appointment is for a remote check of your pacemaker.  This is not an appointment at the office.            Dec 26, 2018  7:45 AM CST   Remote PPM Check with JO TECH1   [facility] ([facility])    [address] [id number] [phone] OPT 2           This appointment is for a remote check of your pacemaker.  This is not an appointment at the office.              Future tests that were ordered for you today     Open Future Orders        Priority Expected Expires Ordered    Follow-Up with Cardiac Advanced Practice Provider Routine 8/30/2019 1/12/2020 8/30/2018    Follow-Up with Electrophysiologist Routine 8/30/2020 1/12/2021 8/30/2018            Who to contact     If you have questions or need follow up information about today's clinic visit or your schedule please contact [facility] directly at [phone].  Normal or non-critical lab and imaging results will be communicated to you by MyChart, letter or phone within 4 business days after the clinic has received the results. If you do not hear from us within 7 days, please contact the clinic through MyChart or phone. If you have a critical or abnormal lab result, we will notify you  by phone as soon as possible.  Submit refill requests through [employer] or call your pharmacy and they will forward the refill request to us. Please allow 3 business days for your refill to be completed.          Additional Information About Your Visit        [employer] Information     [employer] gives you secure access to your electronic health record. If you see a primary care provider, you can also send messages to your care team and make appointments. If you have questions, please call your primary care clinic.  If you do not have a primary care provider, please call [phone] and they will assist you.        Care EveryWhere ID     This is your Care EveryWhere ID. This could be used by other organizations to access your [facility] medical records  [id number]         Blood Pressure from Last 3 Encounters:   08/30/18 108/78   08/20/18 128/70   11/27/17 106/78    Weight from Last 3 Encounters:   08/30/18 65.3 kg (144 lb)   12/11/17 65.6 kg (144 lb 9.6 oz)   11/27/17 63.9 kg (140 lb 12.8 oz)              We Performed the Following     PM DEVICE PROGRAMMING EVAL, DUAL LEAD PACER ([id number])        Primary Care Provider Office Phone # Fax #    Darin Patten PA-C [phone] [address] [facility] [address]        Equal Access to Services     JOCELYN RODAS : Hadii aad ku hadasho Soomaali, waaxda luqadaha, qaybta kaalmada adeegyada, waxay idiin hayaan nehemias may . So [facility] [phone].    ATENCIÓN: Si habla español, tiene a reyes disposición servicios gratuitos de asistencia lingüística. Llame al [phone].    We comply with applicable federal civil rights laws and [place] laws. We do not discriminate on the basis of race, color, national origin, age, disability, sex, sexual orientation, or gender identity.            Thank you!     Thank you for choosing [facility]  for your care. Our goal is always to provide you with excellent care. Hearing back  from our patients is one way we can continue to improve our services. Please take a few minutes to complete the written survey that you may receive in the mail after your visit with us. Thank you!             Your Updated Medication List - Protect others around you: Learn how to safely use, store and throw away your medicines at www.disposemymeds.org.          This list is accurate as of 8/30/18  1:12 PM.  Always use your most recent med list.                   Brand Name Dispense Instructions for use Diagnosis    ASPIRIN PO      Take 81 mg by mouth daily        calcium carbonate 500 mg {elemental} 500 MG tablet    OS-SONIA     Take 500 mg by mouth 2 times daily        cholecalciferol 1000 UNIT tablet    vitamin D3     Take 1 tablet (1,000 Units) by mouth daily        mometasone 0.1 % ointment    ELOCON    45 g    Apply sparingly to affected area twice daily as needed.  Do not apply to face.    Chronic diffuse otitis externa of left ear, Psoriasis       OMEGA-3 FISH OIL PO           QC WOMENS DAILY MULTIvitamin Tabs           UNABLE TO FIND      MEDICATION NAME: Cranberry tablets

## 2018-08-30 NOTE — PROGRESS NOTES
Medtronic Adapta (D) Pacemaker Device Check/ [place]/Dr Stout  AP: 3.4 % : 0.1 %  Mode: AAIR/DDDR        Underlying Rhythm: SR  Heart Rate: histogram shows a stable and adequate HR  Sensing: Stable    Pacing Threshold: Stable   Impedance: WNL  Battery Status: 9-14 years estimated longevity  Device Site: No issues   Atrial Arrhythmia: 107 mode switch episodes since 12-12-16. Most show short bursts of PAT, 2 episodes of AF/ one lasting 2 hours. EGM is mostly marker but tracing indicates AF. Pt is not on OAC  Ventricular Arrhythmia: NONE  Setting Change: NONE    Care Plan: Remote in 3 months. Pt likely will be transferring care to Dr Elmore in [place]. She is asked to call our clinic for a courtesy to let us know if this will be done. nico

## 2018-08-30 NOTE — MR AVS SNAPSHOT
After Visit Summary   8/30/2018    Tatiana Skelton    MRN: [id number]           Patient Information     Date Of Birth          1955        Visit Information        Provider Department      8/30/2018 1:00 PM Sha Stout MD [facility]        Today's Diagnoses     Sinoatrial node dysfunction (H)    -  1       Follow-ups after your visit        Additional Services     Follow-Up with Cardiac Advanced Practice Provider           Follow-Up with Electrophysiologist                 Your next 10 appointments already scheduled     Dec 06, 2018 11:45 AM CST   Remote PPM Check with JO TECH1   [facility] ([facility])    [id number]  [facility] [address] [phone] OPT 2           This appointment is for a remote check of your pacemaker.  This is not an appointment at the office.            Dec 26, 2018  7:45 AM CST   Remote PPM Check with JO TECH1   [facility] ([facility])    [address] [facility] [address]  [facility] [id number] [phone] OPT 2           This appointment is for a remote check of your pacemaker.  This is not an appointment at the office.              Future tests that were ordered for you today     Open Future Orders        Priority Expected Expires Ordered    Follow-Up with Cardiac Advanced Practice Provider Routine 8/30/2019 1/12/2020 8/30/2018    Follow-Up with Electrophysiologist Routine 8/30/2020 1/12/2021 8/30/2018            Who to contact     If you have questions or need follow up information about today's clinic visit or your schedule please contact [facility] directly at [phone].  Normal or non-critical lab and imaging results will be communicated to you by MyChart, letter or phone within 4 business days after the clinic has received the results. If you do not hear from  "us within 7 days, please contact the clinic through [employer] or phone. If you have a critical or abnormal lab result, we will notify you by phone as soon as possible.  Submit refill requests through [employer] or call your pharmacy and they will forward the refill request to us. Please allow 3 business days for your refill to be completed.          Additional Information About Your Visit        [employer] Information     [employer] gives you secure access to your electronic health record. If you see a primary care provider, you can also send messages to your care team and make appointments. If you have questions, please call your primary care clinic.  If you do not have a primary care provider, please call [phone] and they will assist you.        Care EveryWhere ID     This is your Care EveryWhere ID. This could be used by other organizations to access your [facility] medical records  [id number]        Your Vitals Were     Pulse Height Pulse Oximetry BMI (Body Mass Index)          62 1.74 m (5' 8.5\") 96% 21.58 kg/m2         Blood Pressure from Last 3 Encounters:   08/30/18 108/78   08/20/18 128/70   11/27/17 106/78    Weight from Last 3 Encounters:   08/30/18 65.3 kg (144 lb)   12/11/17 65.6 kg (144 lb 9.6 oz)   11/27/17 63.9 kg (140 lb 12.8 oz)               Primary Care Provider Office Phone # Fax #    Darin Patten PA-C [phone] [address] [facility] [address]        Equal Access to Services     [facility] AH: Hadii aad ku hadasho Soomaali, waaxda luqadaha, qaybta kaalmada adeegyada, waxay yobanyin haywandan nehemias peña. So [facility] [phone].    ATENCIÓN: Si habla español, tiene a reyes disposición servicios gratuitos de asistencia lingüística. Llame al [phone].    We comply with applicable federal civil rights laws and [place] laws. We do not discriminate on the basis of race, color, national origin, age, disability, sex, sexual orientation, or gender identity.            Thank " you!     Thank you for choosing [facility]  for your care. Our goal is always to provide you with excellent care. Hearing back from our patients is one way we can continue to improve our services. Please take a few minutes to complete the written survey that you may receive in the mail after your visit with us. Thank you!             Your Updated Medication List - Protect others around you: Learn how to safely use, store and throw away your medicines at www.disposemymeds.org.          This list is accurate as of 8/30/18  1:10 PM.  Always use your most recent med list.                   Brand Name Dispense Instructions for use Diagnosis    ASPIRIN PO      Take 81 mg by mouth daily        calcium carbonate 500 mg {elemental} 500 MG tablet    OS-SONIA     Take 500 mg by mouth 2 times daily        cholecalciferol 1000 UNIT tablet    vitamin D3     Take 1 tablet (1,000 Units) by mouth daily        mometasone 0.1 % ointment    ELOCON    45 g    Apply sparingly to affected area twice daily as needed.  Do not apply to face.    Chronic diffuse otitis externa of left ear, Psoriasis       OMEGA-3 FISH OIL PO           QC WOMENS DAILY MULTIvitamin Tabs           UNABLE TO FIND      MEDICATION NAME: Cranberry tablets

## 2018-08-30 NOTE — PROGRESS NOTES
Service Date: 2018      Darin Aden PA-C    [facility]    [address] [place] [address]      RE: Tatiana Skelton   MRN: [id number]   : 1955      Dear  De Tenzinapollo:       I saw Ms. Skelton for establishment of cardiac care for sinus node dysfunction.  She is a 63-year-old white female with a history of recurrent syncope and was diagnosed to have sinus node dysfunction in .  She received a Medtronic dual-chamber pacemaker implantation in [place], [place] in .  Subsequently, she moved her care to [place].  Her pulse generator was replaced in .  At that time, the new pulse generator was implanted under the pectoral muscle.  She has moved her home to the [facility] and now she would like to have her cardiac care taken care of in [place].  Symptomatically, she has no fatigue, shortness of breath, palpitation or dizziness.  She is working on multiple jobs.      PAST MEDICAL HISTORY:  Remarkable for human papilloma virus infection which is under close monitoring by Gynecology.      PHYSICAL EXAMINATION:   VITAL SIGNS:  Blood pressure was 108/70, heart rate 62 beats per minute, body weight 144 pounds.   HEENT:  Eyes and ENT were unremarkable.   LUNGS:  Clear.   CARDIAC:  Rhythm was regular and heart sounds were normal without murmur.  The pacemaker site looked well.   ABDOMEN:  Examination showed no hepatomegaly.   EXTREMITIES:  There was no pedal edema.      ASSESSMENT AND RECOMMENDATIONS:  Ms. Skelton is doing well symptomatically.  Her pacemaker function is normal.  The blood pressure and heart rate are acceptable.  She does not take cardiac medications, except for a baby aspirin.  She does not need any further testing.  She can return for Cardiology followup in 1 year.      Sincerely,         WOLF PEREIRA MD             D: 2018   T: 2018   MT: GAEL      Name:     TATIANA SKELTON   MRN:      [id number]        Account:      [id number]   :       1955           Service Date: 08/30/2018      Document: [id number]

## 2018-08-30 NOTE — PROGRESS NOTES
HPI and Plan:   See dictation    Orders Placed This Encounter   Procedures     Follow-Up with Cardiac Advanced Practice Provider     Follow-Up with Electrophysiologist       No orders of the defined types were placed in this encounter.      There are no discontinued medications.      Encounter Diagnosis   Name Primary?     Sinoatrial node dysfunction (H) Yes       CURRENT MEDICATIONS:  Current Outpatient Prescriptions   Medication Sig Dispense Refill     ASPIRIN PO Take 81 mg by mouth daily       calcium carbonate (OS-SONIA 500 MG [place]. [place]) 500 MG tablet Take 500 mg by mouth 2 times daily       cholecalciferol (VITAMIN D) 1000 UNIT tablet Take 1 tablet (1,000 Units) by mouth daily       mometasone (ELOCON) 0.1 % ointment Apply sparingly to affected area twice daily as needed.  Do not apply to face. (Patient not taking: Reported on 8/30/2018) 45 g 1     Multiple Vitamins-Minerals (QC WOMENS DAILY MULTIVITAMIN) TABS        Omega-3 Fatty Acids (OMEGA-3 FISH OIL PO)        UNABLE TO FIND MEDICATION NAME: Cranberry tablets         ALLERGIES     Allergies   Allergen Reactions     Versed      Hypersensitive     Morphine Sulfate Anxiety       PAST MEDICAL HISTORY:  Past Medical History:   Diagnosis Date     Cervical high risk HPV (human papillomavirus) test positive 10/2/14, 3/6/16     Elevated cholesterol      Generalized osteoarthrosis, unspecified site 1/30/2015     Open angle with borderline findings, low risk 1/9/2014     Sick sinus syndrome (H)     PM implant since 2002     Syncope        PAST SURGICAL HISTORY:  Past Surgical History:   Procedure Laterality Date     ABDOMEN SURGERY      Tubal     BUNIONECTOMY JACKIE BILATERAL       COLONOSCOPY N/A 8/20/2018    Procedure: COLONOSCOPY;  COLONOSCOPY;  Surgeon: Manas Lee DO;  Location:  GI     HC OR IMPLANT BREAST       IMPLANT IMPLANTABLE CARDIOVERTER DEFIBRILLATOR  6/4/2012    she does not have an ICD. She has a pacemker.     IMPLANT PACEMAKER  2002  "   Dual chamber medtronic for sick sinus snyndrome     TUBAL LIGATION  2000       FAMILY HISTORY:  Family History   Problem Relation Age of Onset     Respiratory Mother 61     COPD     HEART DISEASE Mother      Eye Disorder Mother      cataract surgery     Lipids Mother      Diabetes Mother      Hypertension Mother      Eye Disorder Father      Glaucoma Father 80     takes drops, frequent apt - suspect it is glaucoma     Hypertension Sister      Lipids Brother      Hypertension Brother      Lipids Sister      Cancer No family hx of      Cerebrovascular Disease No family hx of      Thyroid Disease No family hx of      Macular Degeneration No family hx of        SOCIAL HISTORY:  Social History     Social History     Marital status: Single     Spouse name: N/A     Number of children: N/A     Years of education: N/A     Social History Main Topics     Smoking status: Never Smoker     Smokeless tobacco: Never Used      Comment: Lives in smoke free household     Alcohol use Yes      Comment: twice a month     Drug use: No     Sexual activity: Yes     Partners: Male     Birth control/ protection: Surgical, Female Surgical      Comment: tubal ligation     Other Topics Concern     Not on file     Social History Narrative       Review of Systems:  Skin:  Negative       Eyes:  Negative      ENT:  Negative      Respiratory:  Negative       Cardiovascular:  Negative for;palpitations;chest pain;edema;lightheadedness;dizziness      Gastroenterology: Negative      Genitourinary:  Negative      Musculoskeletal:  Positive for   neck pain all the time   Neurologic:  Positive for headaches    Psychiatric:  Positive for sleep disturbances    Heme/Lymph/Imm:  Positive for allergies    Endocrine:  Negative        Physical Exam:  Vitals: /78 (BP Location: Right arm, Patient Position: Fowlers, Cuff Size: Adult Regular)  Pulse 62  Ht 1.74 m (5' 8.5\")  Wt 65.3 kg (144 lb)  SpO2 96%  BMI 21.58 kg/m2    Constitutional:  cooperative, " alert and oriented, well developed, well nourished, in no acute distress        Skin:  warm and dry to the touch, no apparent skin lesions or masses noted          Head:  normocephalic, no masses or lesions        Eyes:  pupils equal and round, conjunctivae and lids unremarkable, sclera white, no xanthalasma, EOMS intact, no nystagmus        Lymph:No Cervical lymphadenopathy present     ENT:  no pallor or cyanosis, dentition good        Neck:  carotid pulses are full and equal bilaterally, JVP normal, no carotid bruit        Respiratory:  normal breath sounds, clear to auscultation, normal A-P diameter, normal symmetry, normal respiratory excursion, no use of accessory muscles         Cardiac: regular rhythm, normal S1/S2, no S3 or S4, apical impulse not displaced, no murmurs, gallops or rubs                                                         GI:  abdomen soft, non-tender, BS normoactive, no mass, no HSM, no bruits        Extremities and Muscular Skeletal:  no deformities, clubbing, cyanosis, erythema observed              Neurological:  no gross motor deficits        Psych:  Alert and Oriented x 3        CC  Darin Patten PA-C  [address] [place] [address]

## 2018-08-30 NOTE — LETTER
8/30/2018    Darin Aden PA-C  [address] [facility] [address]    RE: Tatiana Skelton       Dear Colleague,    I had the pleasure of seeing Tatiana Skelton in the [facility].    HPI and Plan:   See dictation    Orders Placed This Encounter   Procedures     Follow-Up with Cardiac Advanced Practice Provider     Follow-Up with Electrophysiologist       No orders of the defined types were placed in this encounter.      There are no discontinued medications.      Encounter Diagnosis   Name Primary?     Sinoatrial node dysfunction (H) Yes       CURRENT MEDICATIONS:  Current Outpatient Prescriptions   Medication Sig Dispense Refill     ASPIRIN PO Take 81 mg by mouth daily       calcium carbonate (OS-SONIA 500 MG [place]. [place]) 500 MG tablet Take 500 mg by mouth 2 times daily       cholecalciferol (VITAMIN D) 1000 UNIT tablet Take 1 tablet (1,000 Units) by mouth daily       mometasone (ELOCON) 0.1 % ointment Apply sparingly to affected area twice daily as needed.  Do not apply to face. (Patient not taking: Reported on 8/30/2018) 45 g 1     Multiple Vitamins-Minerals (QC WOMENS DAILY MULTIVITAMIN) TABS        Omega-3 Fatty Acids (OMEGA-3 FISH OIL PO)        UNABLE TO FIND MEDICATION NAME: Cranberry tablets         ALLERGIES     Allergies   Allergen Reactions     Versed      Hypersensitive     Morphine Sulfate Anxiety       PAST MEDICAL HISTORY:  Past Medical History:   Diagnosis Date     Cervical high risk HPV (human papillomavirus) test positive 10/2/14, 3/6/16     Elevated cholesterol      Generalized osteoarthrosis, unspecified site 1/30/2015     Open angle with borderline findings, low risk 1/9/2014     Sick sinus syndrome (H)     PM implant since 2002     Syncope        PAST SURGICAL HISTORY:  Past Surgical History:   Procedure Laterality Date     ABDOMEN SURGERY      Tubal     BUNIONECTOMY JACKIE BILATERAL       COLONOSCOPY N/A 8/20/2018    Procedure: COLONOSCOPY;  COLONOSCOPY;   Surgeon: Manas Lee DO;  Location:  GI     HC OR IMPLANT BREAST       IMPLANT IMPLANTABLE CARDIOVERTER DEFIBRILLATOR  6/4/2012    she does not have an ICD. She has a pacemker.     IMPLANT PACEMAKER  2002    Dual chamber medtronic for sick sinus snyndrome     TUBAL LIGATION  2000       FAMILY HISTORY:  Family History   Problem Relation Age of Onset     Respiratory Mother 61     COPD     HEART DISEASE Mother      Eye Disorder Mother      cataract surgery     Lipids Mother      Diabetes Mother      Hypertension Mother      Eye Disorder Father      Glaucoma Father 80     takes drops, frequent apt - suspect it is glaucoma     Hypertension Sister      Lipids Brother      Hypertension Brother      Lipids Sister      Cancer No family hx of      Cerebrovascular Disease No family hx of      Thyroid Disease No family hx of      Macular Degeneration No family hx of        SOCIAL HISTORY:  Social History     Social History     Marital status: Single     Spouse name: N/A     Number of children: N/A     Years of education: N/A     Social History Main Topics     Smoking status: Never Smoker     Smokeless tobacco: Never Used      Comment: Lives in smoke free household     Alcohol use Yes      Comment: twice a month     Drug use: No     Sexual activity: Yes     Partners: Male     Birth control/ protection: Surgical, Female Surgical      Comment: tubal ligation     Other Topics Concern     Not on file     Social History Narrative       Review of Systems:  Skin:  Negative       Eyes:  Negative      ENT:  Negative      Respiratory:  Negative       Cardiovascular:  Negative for;palpitations;chest pain;edema;lightheadedness;dizziness      Gastroenterology: Negative      Genitourinary:  Negative      Musculoskeletal:  Positive for   neck pain all the time   Neurologic:  Positive for headaches    Psychiatric:  Positive for sleep disturbances    Heme/Lymph/Imm:  Positive for allergies    Endocrine:  Negative     "    Physical Exam:  Vitals: /78 (BP Location: Right arm, Patient Position: Fowlers, Cuff Size: Adult Regular)  Pulse 62  Ht 1.74 m (5' 8.5\")  Wt 65.3 kg (144 lb)  SpO2 96%  BMI 21.58 kg/m2    Constitutional:  cooperative, alert and oriented, well developed, well nourished, in no acute distress        Skin:  warm and dry to the touch, no apparent skin lesions or masses noted          Head:  normocephalic, no masses or lesions        Eyes:  pupils equal and round, conjunctivae and lids unremarkable, sclera white, no xanthalasma, EOMS intact, no nystagmus        Lymph:No Cervical lymphadenopathy present     ENT:  no pallor or cyanosis, dentition good        Neck:  carotid pulses are full and equal bilaterally, JVP normal, no carotid bruit        Respiratory:  normal breath sounds, clear to auscultation, normal A-P diameter, normal symmetry, normal respiratory excursion, no use of accessory muscles         Cardiac: regular rhythm, normal S1/S2, no S3 or S4, apical impulse not displaced, no murmurs, gallops or rubs                                                         GI:  abdomen soft, non-tender, BS normoactive, no mass, no HSM, no bruits        Extremities and Muscular Skeletal:  no deformities, clubbing, cyanosis, erythema observed              Neurological:  no gross motor deficits        Psych:  Alert and Oriented x 3        CC  Darin Patten PA-C  [address] [place] [address]                Service Date: 2018      Darin Patten PA-C    [facility]    [address] [place] [address]      RE: Tatiana Skelton   MRN: [id number]   : 1955      Dear Mr. Patten:       I saw Ms. Skelton for establishment of cardiac care for sinus node dysfunction.  She is a 63-year-old white female with a history of recurrent syncope and was diagnosed to have sinus node dysfunction in .  She received a Medtronic dual-chamber pacemaker implantation in [place], [place] in .  " Subsequently, she moved her care to [place].  Her pulse generator was replaced in .  At that time, the new pulse generator was implanted under the pectoral muscle.  She has moved her home to the [place] area and now she would like to have her cardiac care taken care of in [place].  Symptomatically, she has no fatigue, shortness of breath, palpitation or dizziness.  She is working on multiple jobs.      PAST MEDICAL HISTORY:  Remarkable for human papilloma virus infection which is under close monitoring by Gynecology.      PHYSICAL EXAMINATION:   VITAL SIGNS:  Blood pressure was 108/70, heart rate 62 beats per minute, body weight 144 pounds.   HEENT:  Eyes and ENT were unremarkable.   LUNGS:  Clear.   CARDIAC:  Rhythm was regular and heart sounds were normal without murmur.  The pacemaker site looked well.   ABDOMEN:  Examination showed no hepatomegaly.   EXTREMITIES:  There was no pedal edema.      ASSESSMENT AND RECOMMENDATIONS:  Ms. Lanza is doing well symptomatically.  Her pacemaker function is normal.  The blood pressure and heart rate are acceptable.  She does not take cardiac medications, except for a baby aspirin.  She does not need any further testing.  She can return for Cardiology followup in 1 year.      Sincerely,         WOLF STOUT MD             D: 2018   T: 2018   MT: GAEL      Name:     LENA LANZA   MRN:      [id number]        Account:      [id number]   :      1955           Service Date: 2018      Document: [id number]        Thank you for allowing me to participate in the care of your patient.      Sincerely,     Wolf Stout MD     [facility]    cc:   Darin Patten PA-C  [address] [place] [address]

## 2018-12-17 ENCOUNTER — TELEPHONE (OUTPATIENT)
Dept: FAMILY MEDICINE | Facility: OTHER | Age: 63
End: 2018-12-17

## 2018-12-17 ENCOUNTER — OFFICE VISIT (OUTPATIENT)
Dept: FAMILY MEDICINE | Facility: OTHER | Age: 63
End: 2018-12-17

## 2018-12-17 VITALS
OXYGEN SATURATION: 98 % | SYSTOLIC BLOOD PRESSURE: 108 MMHG | DIASTOLIC BLOOD PRESSURE: 66 MMHG | HEART RATE: 78 BPM | BODY MASS INDEX: 21.73 KG/M2 | RESPIRATION RATE: 20 BRPM | TEMPERATURE: 97.8 F | WEIGHT: 145 LBS

## 2018-12-17 DIAGNOSIS — Z12.4 SCREENING FOR CERVICAL CANCER: Primary | ICD-10-CM

## 2018-12-17 DIAGNOSIS — N94.10 PAIN IN FEMALE GENITALIA ON INTERCOURSE: Primary | ICD-10-CM

## 2018-12-17 PROCEDURE — G0123 SCREEN CERV/VAG THIN LAYER: HCPCS | Performed by: NURSE PRACTITIONER

## 2018-12-17 PROCEDURE — 87624 HPV HI-RISK TYP POOLED RSLT: CPT | Performed by: NURSE PRACTITIONER

## 2018-12-17 PROCEDURE — 99213 OFFICE O/P EST LOW 20 MIN: CPT | Performed by: NURSE PRACTITIONER

## 2018-12-17 ASSESSMENT — PAIN SCALES - GENERAL: PAINLEVEL: NO PAIN (0)

## 2018-12-17 NOTE — LETTER
December 31, 2018    Tatiana Skelton  [address] [address] [place] [address]    Dear Tatiana,  We are happy to inform you that your PAP smear result from 12/17/18 is normal.  We are now able to do a follow up test on PAP smears. The DNA test is for HPV (Human Papilloma Virus). Cervical cancer is closely linked with certain types of HPV. Your results showed no evidence of high risk HPV.  Therefore we recommend you return in 3 years for your next pap smear and HPV test.  You will still need to return to the clinic every year for an annual exam and other preventive tests.  If you have additional questions regarding this result, please call our registered nurse, Pia at [phone].  Sincerely,    HERMILA Rasheed CNP/rlm

## 2018-12-17 NOTE — PROGRESS NOTES
SUBJECTIVE:   Tatiana Sketlon is a 63 year old female who presents to clinic today for the following health issues:      PAP SMEAR  -gyn exam    She was told by her insurance that she had to have the pap smear done a separate day from her physical so she only wants a pap today.  She is scheduled for a physical later this week and we offered to do both today, but she declined.     Problem list and histories reviewed & adjusted, as indicated.  Additional history: as documented    Patient Active Problem List   Diagnosis     Paroxysmal atrial fibrillation (H)     Frequent UTI     Cardiac Pacemaker- Medtronic, dual chamber- NOT dependant     Sinus bradycardia     Dysplasia of cervix, low grade (SANJU 1)     Hyperlipidemia LDL goal <100     Open angle with borderline findings, low risk     Generalized osteoarthrosis, unspecified site     Past Surgical History:   Procedure Laterality Date     ABDOMEN SURGERY      Tubal     BUNIONECTOMY JACKIE BILATERAL       COLONOSCOPY N/A 8/20/2018    Procedure: COLONOSCOPY;  COLONOSCOPY;  Surgeon: Manas Lee DO;  Location:  GI     HC OR IMPLANT BREAST       IMPLANT IMPLANTABLE CARDIOVERTER DEFIBRILLATOR  6/4/2012    she does not have an ICD. She has a pacemker.     IMPLANT PACEMAKER  2002    Dual chamber medtronic for sick sinus snyndrome     TUBAL LIGATION  2000       Social History     Tobacco Use     Smoking status: Never Smoker     Smokeless tobacco: Never Used     Tobacco comment: Lives in smoke free household   Substance Use Topics     Alcohol use: Yes     Comment: twice a month     Family History   Problem Relation Age of Onset     Respiratory Mother 61        COPD     Heart Disease Mother      Eye Disorder Mother         cataract surgery     Lipids Mother      Diabetes Mother      Hypertension Mother      Eye Disorder Father      Glaucoma Father 80        takes drops, frequent apt - suspect it is glaucoma     Hypertension Sister      Cancer Sister       Lipids Brother      Hypertension Brother      Lipids Sister      Cerebrovascular Disease No family hx of      Thyroid Disease No family hx of      Macular Degeneration No family hx of          No current outpatient medications on file.     Allergies   Allergen Reactions     Versed      Hypersensitive     Morphine Sulfate Anxiety     BP Readings from Last 3 Encounters:   12/17/18 108/66   08/30/18 108/78   08/20/18 128/70    Wt Readings from Last 3 Encounters:   12/17/18 65.8 kg (145 lb)   08/30/18 65.3 kg (144 lb)   12/11/17 65.6 kg (144 lb 9.6 oz)                    Reviewed and updated as needed this visit by clinical staff  Tobacco  Allergies  Meds  Med Hx  Surg Hx  Fam Hx  Soc Hx      Reviewed and updated as needed this visit by Provider         ROS:  Constitutional, HEENT, cardiovascular, pulmonary, gi and gu systems are negative, except as otherwise noted.    OBJECTIVE:     /66   Pulse 78   Temp 97.8  F (36.6  C) (Temporal)   Resp 20   Wt 65.8 kg (145 lb)   LMP  (LMP Unknown)   SpO2 98%   Breastfeeding? No   BMI 21.73 kg/m    Body mass index is 21.73 kg/m .  GENERAL: healthy, alert and no distress   (female): normal female external genitalia, normal urethral meatus, vaginal mucosa, normal cervix without masses or discharge    Diagnostic Test Results:  Pending     ASSESSMENT/PLAN:         1. Screening for cervical cancer  - Pap imaged thin layer screen with HPV - recommended age 30 - 65 years (select HPV order below)  - HPV High Risk Types DNA Cervical    See Patient Instructions  She will return for a full physical as scheduled.     HERMILA Rasheed [facility]

## 2018-12-17 NOTE — PATIENT INSTRUCTIONS
The results of the pap test take about 2 weeks to come back.  We will send a letter with these results and the recommendations for further pap tests in the future.       Call to see Dr. Oliver in [place]:       G: [facility] [place] [phone]   [email or web address] - Dr. Oliver

## 2018-12-20 ENCOUNTER — OFFICE VISIT (OUTPATIENT)
Dept: FAMILY MEDICINE | Facility: OTHER | Age: 63
End: 2018-12-20
Payer: COMMERCIAL

## 2018-12-20 VITALS
TEMPERATURE: 98.5 F | DIASTOLIC BLOOD PRESSURE: 78 MMHG | HEART RATE: 64 BPM | RESPIRATION RATE: 20 BRPM | HEIGHT: 69 IN | WEIGHT: 145 LBS | SYSTOLIC BLOOD PRESSURE: 118 MMHG | BODY MASS INDEX: 21.48 KG/M2

## 2018-12-20 DIAGNOSIS — Z13.6 CARDIOVASCULAR SCREENING; LDL GOAL LESS THAN 130: ICD-10-CM

## 2018-12-20 DIAGNOSIS — Z23 NEED FOR VACCINATION: ICD-10-CM

## 2018-12-20 DIAGNOSIS — Z13.1 SCREENING FOR DIABETES MELLITUS: ICD-10-CM

## 2018-12-20 DIAGNOSIS — Z00.00 ROUTINE HISTORY AND PHYSICAL EXAMINATION OF ADULT: Primary | ICD-10-CM

## 2018-12-20 DIAGNOSIS — N87.0 DYSPLASIA OF CERVIX, LOW GRADE (CIN 1): ICD-10-CM

## 2018-12-20 DIAGNOSIS — Z23 NEED FOR PROPHYLACTIC VACCINATION AND INOCULATION AGAINST INFLUENZA: ICD-10-CM

## 2018-12-20 DIAGNOSIS — H60.312 CHRONIC DIFFUSE OTITIS EXTERNA OF LEFT EAR: ICD-10-CM

## 2018-12-20 LAB
COPATH REPORT: NORMAL
PAP: NORMAL

## 2018-12-20 PROCEDURE — 90472 IMMUNIZATION ADMIN EACH ADD: CPT | Performed by: NURSE PRACTITIONER

## 2018-12-20 PROCEDURE — 90750 HZV VACC RECOMBINANT IM: CPT | Performed by: NURSE PRACTITIONER

## 2018-12-20 PROCEDURE — 99396 PREV VISIT EST AGE 40-64: CPT | Mod: 25 | Performed by: NURSE PRACTITIONER

## 2018-12-20 PROCEDURE — 90471 IMMUNIZATION ADMIN: CPT | Performed by: NURSE PRACTITIONER

## 2018-12-20 PROCEDURE — 90682 RIV4 VACC RECOMBINANT DNA IM: CPT | Performed by: NURSE PRACTITIONER

## 2018-12-20 RX ORDER — MOMETASONE FUROATE 1 MG/G
OINTMENT TOPICAL
Qty: 45 G | Refills: 1 | Status: SHIPPED | OUTPATIENT
Start: 2018-12-20

## 2018-12-20 ASSESSMENT — ENCOUNTER SYMPTOMS
PARESTHESIAS: 0
HEMATOCHEZIA: 0
NERVOUS/ANXIOUS: 1
CONSTIPATION: 0
BREAST MASS: 0
CHILLS: 0
HEADACHES: 0
DIARRHEA: 0
HEMATURIA: 0
FEVER: 0
ARTHRALGIAS: 1
DYSURIA: 0
JOINT SWELLING: 1
FREQUENCY: 1
HEARTBURN: 0
DIZZINESS: 0
WEAKNESS: 0
COUGH: 0
SORE THROAT: 0
PALPITATIONS: 0
MYALGIAS: 0
EYE PAIN: 0
NAUSEA: 0
SHORTNESS OF BREATH: 0

## 2018-12-20 ASSESSMENT — MIFFLIN-ST. JEOR: SCORE: 1279.16

## 2018-12-20 ASSESSMENT — PATIENT HEALTH QUESTIONNAIRE - PHQ9
SUM OF ALL RESPONSES TO PHQ QUESTIONS 1-9: 22
10. IF YOU CHECKED OFF ANY PROBLEMS, HOW DIFFICULT HAVE THESE PROBLEMS MADE IT FOR YOU TO DO YOUR WORK, TAKE CARE OF THINGS AT HOME, OR GET ALONG WITH OTHER PEOPLE: EXTREMELY DIFFICULT
SUM OF ALL RESPONSES TO PHQ QUESTIONS 1-9: 22

## 2018-12-20 NOTE — NURSING NOTE
Prior to injection, verified patient identity using patient's name and date of birth.  Due to injection administration, patient instructed to remain in clinic for 15 minutes  afterwards, and to report any adverse reaction to me immediately.    Flu shot and Shingrix    Drug Amount Wasted:  None.  Vial/Syringe: Single dose vial and Syringe    ................Anoop Sebastian LPN,   December 20, 2018,      9:45 AM,   [facility]
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
## 2018-12-20 NOTE — PATIENT INSTRUCTIONS
You should have 1200 mg of calcium daily    You should have 1,000-2,000  units of vitamin d daily.     Take a multivitamin once a day and a 81 mg baby aspirin a day.    Call me if you want to do counseling.  I can put in a referral for that.     Consider starting medications for depression.  Make an appointment if you want to do that.     Schedule fasting labs.                   Preventive Health Recommendations  Female Ages 50 - 64    Yearly exam: See your health care provider every year in order to  o Review health changes.   o Discuss preventive care.    o Review your medicines if your doctor has prescribed any.      Get a Pap test every three years (unless you have an abnormal result and your provider advises testing more often).    If you get Pap tests with HPV test, you only need to test every 5 years, unless you have an abnormal result.     You do not need a Pap test if your uterus was removed (hysterectomy) and you have not had cancer.    You should be tested each year for STDs (sexually transmitted diseases) if you're at risk.     Have a mammogram every 1 to 2 years.    Have a colonoscopy at age 50, or have a yearly FIT test (stool test). These exams screen for colon cancer.      Have a cholesterol test every 5 years, or more often if advised.    Have a diabetes test (fasting glucose) every three years. If you are at risk for diabetes, you should have this test more often.     If you are at risk for osteoporosis (brittle bone disease), think about having a bone density scan (DEXA).    Shots: Get a flu shot each year. Get a tetanus shot every 10 years.    Nutrition:     Eat at least 5 servings of fruits and vegetables each day.    Eat whole-grain bread, whole-wheat pasta and brown rice instead of white grains and rice.    Get adequate Calcium and Vitamin D.     Lifestyle    Exercise at least 150 minutes a week (30 minutes a day, 5 days a week). This will help you control your weight and prevent  disease.    Limit alcohol to one drink per day.    No smoking.     Wear sunscreen to prevent skin cancer.     See your dentist every six months for an exam and cleaning.    See your eye doctor every 1 to 2 years.

## 2018-12-21 LAB
FINAL DIAGNOSIS: NORMAL
HPV HR 12 DNA CVX QL NAA+PROBE: NEGATIVE
HPV16 DNA SPEC QL NAA+PROBE: NEGATIVE
HPV18 DNA SPEC QL NAA+PROBE: NEGATIVE
SPECIMEN DESCRIPTION: NORMAL
SPECIMEN SOURCE CVX/VAG CYTO: NORMAL

## 2018-12-21 ASSESSMENT — PATIENT HEALTH QUESTIONNAIRE - PHQ9: SUM OF ALL RESPONSES TO PHQ QUESTIONS 1-9: 22

## 2018-12-24 DIAGNOSIS — Z13.1 SCREENING FOR DIABETES MELLITUS: ICD-10-CM

## 2018-12-24 DIAGNOSIS — Z13.6 CARDIOVASCULAR SCREENING; LDL GOAL LESS THAN 130: ICD-10-CM

## 2018-12-24 LAB
CHOLEST SERPL-MCNC: 214 MG/DL
GLUCOSE SERPL-MCNC: 88 MG/DL (ref 70–99)
HDLC SERPL-MCNC: 94 MG/DL
LDLC SERPL CALC-MCNC: 105 MG/DL
NONHDLC SERPL-MCNC: 120 MG/DL
TRIGL SERPL-MCNC: 74 MG/DL

## 2018-12-24 PROCEDURE — 80061 LIPID PANEL: CPT | Performed by: NURSE PRACTITIONER

## 2018-12-24 PROCEDURE — 82947 ASSAY GLUCOSE BLOOD QUANT: CPT | Performed by: NURSE PRACTITIONER

## 2018-12-24 PROCEDURE — 36415 COLL VENOUS BLD VENIPUNCTURE: CPT | Performed by: NURSE PRACTITIONER

## 2018-12-26 ENCOUNTER — TELEPHONE (OUTPATIENT)
Dept: FAMILY MEDICINE | Facility: OTHER | Age: 63
End: 2018-12-26

## 2018-12-26 NOTE — TELEPHONE ENCOUNTER
----- Message from HERMILA Rasheed CNP sent at 12/26/2018 12:06 PM CST -----  Please notify patient, call or letter    Her labs all looked fine.     Electronically signed by Meryl Dye CNP.

## 2018-12-26 NOTE — LETTER
December 26, 2018      Tatiana Skelton  [address] [address] [place] [id number]        Dear ,    We are writing to inform you of your test results.    Your test results fall within the expected range(s) or remain unchanged from previous results.  Please continue with current treatment plan.    Resulted Orders   **Glucose FUTURE 2mo   Result Value Ref Range    Glucose 88 70 - 99 mg/dL      Comment:      Fasting specimen   Lipid panel reflex to direct LDL Fasting   Result Value Ref Range    Cholesterol 214 (H) <200 mg/dL      Comment:      Desirable:       <200 mg/dl    Triglycerides 74 <150 mg/dL      Comment:      Fasting specimen    HDL Cholesterol 94 >49 mg/dL    LDL Cholesterol Calculated 105 (H) <100 mg/dL      Comment:      Above desirable:  100-129 mg/dl  Borderline High:  130-159 mg/dL  High:             160-189 mg/dL  Very high:       >189 mg/dl      Non HDL Cholesterol 120 <130 mg/dL       If you have any questions or concerns, please call the clinic at the number listed above.       Sincerely,        HERMIAL Rasheed CNP

## 2019-01-03 ENCOUNTER — DOCUMENTATION ONLY (OUTPATIENT)
Dept: CARDIOLOGY | Facility: CLINIC | Age: 64
End: 2019-01-03

## 2019-01-03 NOTE — PROGRESS NOTES
Patient missed [facility] transmission on 12/26/18. Sent letter 12/27. No response, sent 1 week letter today

## 2019-08-20 ENCOUNTER — ALLIED HEALTH/NURSE VISIT (OUTPATIENT)
Dept: FAMILY MEDICINE | Facility: CLINIC | Age: 64
End: 2019-08-20
Payer: COMMERCIAL

## 2019-08-20 ENCOUNTER — NURSE TRIAGE (OUTPATIENT)
Dept: FAMILY MEDICINE | Facility: OTHER | Age: 64
End: 2019-08-20

## 2019-08-20 VITALS — HEART RATE: 66 BPM | SYSTOLIC BLOOD PRESSURE: 120 MMHG | DIASTOLIC BLOOD PRESSURE: 68 MMHG

## 2019-08-20 DIAGNOSIS — Z01.30 BP CHECK: Primary | ICD-10-CM

## 2019-08-20 PROCEDURE — 99207 ZZC NO CHARGE NURSE ONLY: CPT

## 2019-08-20 NOTE — PROGRESS NOTES
Tatiana Skelton is a 64 year old patient who comes in today for a Blood Pressure check.  Initial BP:  /68   Pulse 66   LMP  (LMP Unknown)      66  Disposition: results routed to provider patient came in to have her BP checked and pulse she also has swelling in her feet and legs and could not get into see a provider until tomorrow. Patient states he isnt sleeping due to working and feels this is why she feels this way but people at her work were concerned for her with having a pace maker to at least stop for a bp check before she want home. I wanted to route this to PCP to review. Patient is seeing juancarlos Palacios tomorrow.     Marbella Martínez MA 8/20/2019

## 2019-08-20 NOTE — TELEPHONE ENCOUNTER
": 1955  PHONE #'s: [phone] (home)     PRESENTING PROBLEM:  She said she has been falling asleep today at work today. 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 She hasn't slept well for the past 2 days. \" My son passed about 5 years ago and his anniversary of his death is coming around. I am stressing out about it, I guess. I have slept maybe 2 hours int he past 2 days. My co worker saw me sleeping and head nodding at my desk today and told me I needed to get checked out. I really think I just need to go home and so go sleep \"    NURSING ASSESSMENT  Description:   As above.   Onset/duration:   Today at work, kept falling asleep.   Precip. factors:  Etiology unknown. \" I do have a pacemaker . It is set at 50. \"  Assoc. Sx:  NO chest pain, no dizziness. Said she was getting some leg cramps last night and that kept her awake, too. Some swelling noted in her feet today. Said she sits a lot.    Improves/worsens Sx:  same  Pain scale (1-10)   0/10  Sx specific meds:  NO meds .   LMP/preg/breast feeding:   Post-menopausal.  Last exam/Tx:  Has NOT been seen for this.     RECOMMENDED DISPOSITION:  See in 24 hours - Scheduled to see Jaylyn Garcia CNP, tomorrow at noon. RN told her to have the Float nurse check her BP and pulse before she goes home today to see if this is normal.  Will comply with recommendation: YES   If further questions/concerns or if Sx do not improve, worsen or new Sx develop, call your PCP or [facility] as soon as possible.    NOTES:  Disposition was determined by the first positive assessment question, therefore all previous assessment questions were negative.  Informed to check provider manual or call insurance company to assure coverage.    Guideline used: Anxiety/ Fatigue  Telephone Triage Protocols for Nurses, Fifth Edition, Martha Morrow, RN, RN    "

## 2021-04-01 ENCOUNTER — NURSE TRIAGE (OUTPATIENT)
Dept: NURSING | Facility: CLINIC | Age: 66
End: 2021-04-01

## 2021-04-02 NOTE — TELEPHONE ENCOUNTER
Pt called in states she has hip pain.  The pain is on the right side.  The pain is shooting pain.  The pain is sharp.  The pain is 10/10 on the scale.  The pain started last Rafy 3/28/21.  Pt is not able to walk normally.  The pain go all the way to her ankle.  The Pt start the pain is the first time.  No rash.  Unable to sit on toilet because of the pain.  The disposition is to be seen with in 4 ours  Care advice given per protocol.  Patient agrees with care advice given.   Agreed to call back if he has additional symptoms or questions.    Jose Schumacher [facility] 4/1/2021 7:39 PM      Reason for Disposition    [1] SEVERE pain (e.g., excruciating, unable to do any normal activities) AND [2] not improved after 2 hours of pain medicine    Additional Information    Negative: Looks like a broken bone or dislocated joint (e.g., crooked or deformed)    Negative: Sounds like a life-threatening emergency to the triager    Negative: Followed a hip injury    Negative: Leg pain is main symptom    Negative: Back pain radiating (shooting) into hip    Negative: [1] SEVERE pain (e.g., excruciating, unable to do any normal activities) AND [2] fever    Negative: Can't stand (bear weight) or walk    Negative: [1] Red area or streak AND [2] fever    Negative: Patient sounds very sick or weak to the triager    Protocols used: HIP PAIN-A-

## 2021-07-13 ENCOUNTER — OFFICE VISIT (OUTPATIENT)
Dept: CARDIOLOGY | Facility: CLINIC | Age: 66
End: 2021-07-13
Payer: COMMERCIAL

## 2021-07-13 VITALS
OXYGEN SATURATION: 94 % | HEART RATE: 88 BPM | HEIGHT: 69 IN | BODY MASS INDEX: 25.13 KG/M2 | DIASTOLIC BLOOD PRESSURE: 78 MMHG | SYSTOLIC BLOOD PRESSURE: 118 MMHG | WEIGHT: 169.7 LBS

## 2021-07-13 DIAGNOSIS — I25.10 CORONARY ARTERY DISEASE INVOLVING NATIVE CORONARY ARTERY OF NATIVE HEART WITHOUT ANGINA PECTORIS: Primary | ICD-10-CM

## 2021-07-13 DIAGNOSIS — E78.00 HYPERCHOLESTEROLEMIA: ICD-10-CM

## 2021-07-13 DIAGNOSIS — Z95.0 CARDIAC PACEMAKER IN SITU: ICD-10-CM

## 2021-07-13 DIAGNOSIS — I49.5 SINUS NODE DYSFUNCTION (H): ICD-10-CM

## 2021-07-13 PROCEDURE — 99214 OFFICE O/P EST MOD 30 MIN: CPT | Performed by: INTERNAL MEDICINE

## 2021-07-13 RX ORDER — ROSUVASTATIN CALCIUM 10 MG/1
40 TABLET, COATED ORAL
COMMUNITY
Start: 2021-06-11 | End: 2021-07-13

## 2021-07-13 RX ORDER — ROSUVASTATIN CALCIUM 20 MG/1
40 TABLET, COATED ORAL DAILY
Qty: 60 TABLET | Refills: 11 | Status: SHIPPED | OUTPATIENT
Start: 2021-07-13 | End: 2022-07-13

## 2021-07-13 RX ORDER — ASPIRIN 81 MG/1
81 TABLET, CHEWABLE ORAL DAILY
COMMUNITY
End: 2023-04-10

## 2021-07-13 ASSESSMENT — MIFFLIN-ST. JEOR: SCORE: 1376.19

## 2021-07-13 NOTE — PROGRESS NOTES
Service Date: 07/13/2021    HISTORY OF PRESENT ILLNESS:  Tatiana Skelton, a 66-year-old woman with recently documented coronary atherosclerosis, hypercholesterolemia and bradycardia (status post pacemaker implantation) was seen today at your request for recommendations regarding long-term management.    Ms. Skelton underwent a coronary CT calcium study on 06/10/2021 that demonstrated an Agatston score of 609, which places the patient at the 97th percentile for matched age and gender cohort.      Lipid profile showed a total cholesterol of 285, LDL cholesterol of 172, HDL Cholesterol  of 50 and  and you  placed the patient on rosuvastatin 10 mg daily, aspirin and recommended Cardiology consultation.    Ms. Skelton has a distant history of recurrent syncope with documented bradycardia. Her chart lists a diagnosis of atrial fibrillation, however I see no documentation of atrial arrhythmia in her chart records. She underwent dual-chamber pacemaker implantation in 2002.  In 2012, she underwent an Impulse generator replacement during which the battery  was placed underneath the left pectoral muscle.  The patient tells me her device has been interrogated in the past, but not within the last few years.  During previous interrogations, her device function has been normal.  Ms. Skelton has been free of syncope since the last generator replacement.    She does not describe chest, arm, neck, jaw or back discomfort with exertion.  There is no history of hypertension, cigarette smoking, diabetes or previous MI.  There is a family history of coronary disease in her father.  She has several siblings who have had atrial fibrillation.  I see no recent documentation of atrial fibrillation in her chart at this time.    SOCIAL HISTORY:  The patient is single.  She had 3 children, but lost one son in a car accident.  She presently lives with her boyfriend.  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  She enjoys her jobs, but finding they consume a lot of her time and she does not exercise on a regular basis at this time.    ALLERGIES:  Midazolam causes a paradoxical excitement and should be avoided for in the future.  Morphine sulfate causes anxiety.    REVIEW OF SYSTEMS:  A 12-point review of systems was performed.  Outside the issues mentioned in the HPI, there are no other specific complaints.  She has had some chronic lower extremity swelling, worse as the day goes on, but better in the morning after her legs have been up.    PAST SURGICAL HISTORY:  Previous pacemaker implantation, previous breast augmentation surgery, previous implantation of pacemaker device below the pectoral muscle and left side.    PHYSICAL EXAMINATION:    GENERAL:  Exam today demonstrates a very pleasant, cooperative, and intelligent 66-year-old woman who appears younger than stated age.  VITAL SIGNS:  Her blood pressure is 118/78, her heart rate is 88.  Her height is 1.74 meters her weight is 77 kg.  Her BMI is 25.4.  RESPIRATORY:  Her lungs are clear to percussion and auscultation.  CARDIOVASCULAR:  Shows a normal S1 with a normal S2.  There is no S3.  There is no murmur, rub or click.  Her pulses are symmetrical in the carotid, radial, brachial femoral, popliteal, dorsalis pedis and posterior tibials.  ABDOMEN:  Bowel sounds are present in all 4 quadrants.  Liver percusses to 7 cm.  Spleen is not palpable.  The aorta is not tender or enlarged.  LOWER EXTREMITIES:  There is no swelling, cyanosis or clubbing.  NEUROLOGIC:  Cranial nerves II through XII are intact.  Her strength is equal and symmetrical.  She displays normal insight and judgment.    MEDICATIONS:    1.  Aspirin 81 mg daily.  2.  Rosuvastatin 10 mg daily.    The last device clinic report is from 2018 and at that time, a normal battery function was noted with an underlying sinus rhythm.    ASSESSMENT:  Ms. Skelton has documented coronary atherosclerosis,  based upon an abnormal coronary calcium study.  The patient is asymptomatic, but has not undergone formal stress testing.  In addition to aggressive lifestyle intervention, including a regular exercise program, Mediterranean-style diet, continued abstention from tobacco, I would increase rosuvastatin from 10 mg to 40 mg daily.  I would not recommend invasive evaluation unless stress testing demonstrates evidence of significant ischemia or she develops anginal symptoms.     Her device will require long-term followup.  At present, I cannot confirm a diagnosis of atrial fibrillation, but fortunately the device will allow us to monitor the patient long-term for any evidence of atrial fibrillation.  Her CHADS-VASc score would be at least 3 based on age, gender, and the presence of vascular disease.  We discussed the features of a Mediterranean-style diet and a regular exercise program.  The patient does not abuse tobacco.    RECOMMENDATIONS:    1.  Mediterranean-style diet.  2.  Regular exercise program 45 minutes 5-7 times a week.  3.  Avoidance of tobacco.  4.  Stress echocardiogram.  If no significant ischemia is seen, would not recommend any further cardiac testing at this time.  If significant ischemia is seen, we will make recommendations regarding angiography.  5.  Enroll in Pacemaker Clinic.  6.  Increase her rosuvastatin from 10 mg to 40 mg daily.  7.  Continue aspirin 81 daily.    We will plan a followup visit in about 1 year to follow the patient's pacemaker.    We greatly appreciate the opportunity to care of your patient, Tatiana Skelton.    cc:  [place] [address]    Isaac Lomeli MD        D: 2021   T: 2021   MT: PHILLIP    Name:     TATIANA SKELTON  MRN:      [id number]        Account:      [id number]   :      1955           Service Date: 2021       Document: [id number]

## 2021-07-13 NOTE — PROGRESS NOTES
"HISTORY OF PRESENT ILLNESS:  Had syncope 2002 in [place]. New generator replacement 2012 under pectoral muscle. Functioning normally No further atrial fibrillation     NO smoking  DM BP good. Family hx dad brother son on afib medications. Mom MI 74, sisters and 3 brothers     Allergies versed paradoxical excitement  Morphine nasuea    Single 2 living child lost son in car accident. Lives with boyfriend. 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      No exercise no symptoms.   Pacemaker breast implants     Leg swelling better in AM.     Orders this Visit:  No orders of the defined types were placed in this encounter.    Orders Placed This Encounter   Medications     rosuvastatin (CRESTOR) 10 MG tablet     Sig: Take 10 mg by mouth     aspirin (ASA) 81 MG chewable tablet     Sig: Take 81 mg by mouth daily     There are no discontinued medications.    No diagnosis found.    CURRENT MEDICATIONS:  Current Outpatient Medications   Medication Sig Dispense Refill     aspirin (ASA) 81 MG chewable tablet Take 81 mg by mouth daily       rosuvastatin (CRESTOR) 10 MG tablet Take 10 mg by mouth       mometasone (ELOCON) 0.1 % external ointment Apply sparingly to affected area twice daily as needed.  Do not apply to face. 45 g 1       ALLERGIES     Allergies   Allergen Reactions     Versed      Hypersensitive     Morphine Sulfate Anxiety       PAST MEDICAL, SURGICAL, FAMILY, SOCIAL HISTORY:  History was reviewed and updated as needed, see medical record.    Review of Systems:  A 12-point review of systems was completed, see medical record for detailed review of systems information.    Physical Exam:  Vitals: /78 (BP Location: Left arm, Patient Position: Sitting, Cuff Size: Adult Regular)   Pulse 88   Ht 1.74 m (5' 8.5\")   Wt 77 kg (169 lb 11.2 oz)   LMP  (LMP Unknown)   SpO2 94%   BMI 25.43 kg/m      Constitutional:           Skin:           Head:           Eyes:           ENT:       "     Neck:           Chest:           Cardiac:                    Abdomen:           Vascular:                                        Extremities and Back:           Neurological:           ASSESSMENT:  ? Atrial fibrillation.  Asymptomatic CAD.       RECOMMENDATIONS:       Recent Lab Results:  LIPID RESULTS:  Lab Results   Component Value Date    CHOL 214 (H) 12/24/2018    HDL 94 12/24/2018     (H) 12/24/2018    TRIG 74 12/24/2018    CHOLHDLRATIO 2.1 08/25/2014       LIVER ENZYME RESULTS:  Lab Results   Component Value Date    AST 15 01/30/2015    ALT 17 01/30/2015       CBC RESULTS:  Lab Results   Component Value Date    WBC 4.3 01/30/2015    RBC 4.62 01/30/2015    HGB 14.3 01/30/2015    HCT 40.4 01/30/2015    MCV 87 01/30/2015    MCH 31.0 01/30/2015    MCHC 35.4 01/30/2015    RDW 13.6 01/30/2015     01/30/2015       BMP RESULTS:  Lab Results   Component Value Date     04/07/2016    POTASSIUM 3.8 04/07/2016    CHLORIDE 106 04/07/2016    CO2 29 04/07/2016    ANIONGAP 9 04/07/2016    GLC 88 12/24/2018    BUN 19 04/07/2016    CR 0.78 04/07/2016    GFRESTIMATED 75 04/07/2016    GFRESTBLACK >90   GFR Calc   04/07/2016    SONIA 9.7 04/07/2016        A1C RESULTS:  No results found for: A1C    INR RESULTS:  No results found for: INR    We greatly appreciate the opportunity to be involved in the care of your patient, Tatiana Skelton.    Sincerely,  Isaac Lomeli MD      CC  No referring provider defined for this encounter.

## 2021-07-13 NOTE — LETTER
7/13/2021    Physician No Ref-Primary  No address on file    RE: Tatiana Skelton       Dear Colleague,    I had the pleasure of seeing Tatiana Skelton in the [facility].    HISTORY OF PRESENT ILLNESS:  Had syncope 2002 in [place]. New generator replacement 2012 under pectoral muscle. Functioning normally No further atrial fibrillation     NO smoking  DM BP good. Family hx dad brother son on afib medications. Mom MI 74, sisters and 3 brothers     Allergies versed paradoxical excitement  Morphine nasuea    Single 2 living child lost son in car accident. Lives with boyfriend. 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      No exercise no symptoms.   Pacemaker breast implants     Leg swelling better in AM.     Orders this Visit:  No orders of the defined types were placed in this encounter.    Orders Placed This Encounter   Medications     rosuvastatin (CRESTOR) 10 MG tablet     Sig: Take 10 mg by mouth     aspirin (ASA) 81 MG chewable tablet     Sig: Take 81 mg by mouth daily     There are no discontinued medications.    No diagnosis found.    CURRENT MEDICATIONS:  Current Outpatient Medications   Medication Sig Dispense Refill     aspirin (ASA) 81 MG chewable tablet Take 81 mg by mouth daily       rosuvastatin (CRESTOR) 10 MG tablet Take 10 mg by mouth       mometasone (ELOCON) 0.1 % external ointment Apply sparingly to affected area twice daily as needed.  Do not apply to face. 45 g 1       ALLERGIES     Allergies   Allergen Reactions     Versed      Hypersensitive     Morphine Sulfate Anxiety       PAST MEDICAL, SURGICAL, FAMILY, SOCIAL HISTORY:  History was reviewed and updated as needed, see medical record.    Review of Systems:  A 12-point review of systems was completed, see medical record for detailed review of systems information.    Physical Exam:  Vitals: /78 (BP Location: Left arm, Patient Position:  "Sitting, Cuff Size: Adult Regular)   Pulse 88   Ht 1.74 m (5' 8.5\")   Wt 77 kg (169 lb 11.2 oz)   LMP  (LMP Unknown)   SpO2 94%   BMI 25.43 kg/m      Constitutional:           Skin:           Head:           Eyes:           ENT:           Neck:           Chest:           Cardiac:                    Abdomen:           Vascular:                                        Extremities and Back:           Neurological:           ASSESSMENT:  ? Atrial fibrillation.  Asymptomatic CAD.       RECOMMENDATIONS:       Recent Lab Results:  LIPID RESULTS:  Lab Results   Component Value Date    CHOL 214 (H) 12/24/2018    HDL 94 12/24/2018     (H) 12/24/2018    TRIG 74 12/24/2018    CHOLHDLRATIO 2.1 08/25/2014       LIVER ENZYME RESULTS:  Lab Results   Component Value Date    AST 15 01/30/2015    ALT 17 01/30/2015       CBC RESULTS:  Lab Results   Component Value Date    WBC 4.3 01/30/2015    RBC 4.62 01/30/2015    HGB 14.3 01/30/2015    HCT 40.4 01/30/2015    MCV 87 01/30/2015    MCH 31.0 01/30/2015    MCHC 35.4 01/30/2015    RDW 13.6 01/30/2015     01/30/2015       BMP RESULTS:  Lab Results   Component Value Date     04/07/2016    POTASSIUM 3.8 04/07/2016    CHLORIDE 106 04/07/2016    CO2 29 04/07/2016    ANIONGAP 9 04/07/2016    GLC 88 12/24/2018    BUN 19 04/07/2016    CR 0.78 04/07/2016    GFRESTIMATED 75 04/07/2016    GFRESTBLACK >90   GFR Calc   04/07/2016    SONIA 9.7 04/07/2016        A1C RESULTS:  No results found for: A1C    INR RESULTS:  No results found for: INR    We greatly appreciate the opportunity to be involved in the care of your patient, Tatiana Skelton.    Sincerely,  Isaac Lomeli MD      CC  No referring provider defined for this encounter.                                                                         Thank you for allowing me to participate in the care of your patient.      Sincerely,     Isaac Lomeli MD     [facility]" [facility]  cc:   No referring provider defined for this encounter.

## 2021-07-21 ENCOUNTER — HOSPITAL ENCOUNTER (OUTPATIENT)
Dept: CARDIOLOGY | Facility: CLINIC | Age: 66
Discharge: HOME | End: 2021-07-21
Attending: INTERNAL MEDICINE | Admitting: INTERNAL MEDICINE
Payer: COMMERCIAL

## 2021-07-21 DIAGNOSIS — I49.5 SINUS NODE DYSFUNCTION (H): ICD-10-CM

## 2021-07-21 DIAGNOSIS — Z95.0 CARDIAC PACEMAKER IN SITU: ICD-10-CM

## 2021-07-21 DIAGNOSIS — E78.00 HYPERCHOLESTEROLEMIA: ICD-10-CM

## 2021-07-21 DIAGNOSIS — I25.10 CORONARY ARTERY DISEASE INVOLVING NATIVE CORONARY ARTERY OF NATIVE HEART WITHOUT ANGINA PECTORIS: ICD-10-CM

## 2021-07-21 PROCEDURE — 93321 DOPPLER ECHO F-UP/LMTD STD: CPT | Mod: 26 | Performed by: INTERNAL MEDICINE

## 2021-07-21 PROCEDURE — 93325 DOPPLER ECHO COLOR FLOW MAPG: CPT | Mod: TC

## 2021-07-21 PROCEDURE — 93325 DOPPLER ECHO COLOR FLOW MAPG: CPT | Mod: 26 | Performed by: INTERNAL MEDICINE

## 2021-07-21 PROCEDURE — 93350 STRESS TTE ONLY: CPT | Mod: 26 | Performed by: INTERNAL MEDICINE

## 2021-07-21 PROCEDURE — 93018 CV STRESS TEST I&R ONLY: CPT | Performed by: INTERNAL MEDICINE

## 2021-07-21 PROCEDURE — 93350 STRESS TTE ONLY: CPT | Mod: TC

## 2021-07-21 PROCEDURE — 93016 CV STRESS TEST SUPVJ ONLY: CPT | Performed by: INTERNAL MEDICINE

## 2021-11-16 ENCOUNTER — TELEPHONE (OUTPATIENT)
Dept: CARDIOLOGY | Facility: CLINIC | Age: 66
End: 2021-11-16
Payer: COMMERCIAL

## 2021-11-17 NOTE — TELEPHONE ENCOUNTER
Patient called device nurse line today frustrated that the soonest she can have an in clinic device check in [place] is on 2/17/21 at 0815. Patient was scheduled for a check on 11/4/21 in [place], however patient stated she was not aware of this. The next available appointment in [place] is 2/17/21. Explained to patient that she is welcome to come to the [place] location to have her device check where we have more openings.     Patient has been lost to follow up with the device clinic since 8/30/2018. At this time battery was estimated at 9-14 years. She had an in clinic visit with Dr Lomeli on 7/13/21 and at that time a stress test was ordered(which was completed) and patient was asked to follow up with device clinic.     Offered to have remote monitor sent to patient, which would arrive in 6-8 weeks. Patient was hesitant and wanted to check with her insurance company. Patient was going to call her insurance and then let us know.     Patient asked that we contact Dr Lomeli to make sure that he is aware that her device check will be in February. Update was sent to Dr Lomeli.

## 2021-11-17 NOTE — TELEPHONE ENCOUNTER
Called patient with update. Dr Lomeli is comfortable with her waiting for device check in February. Patient is aware that she can come to the [place] location and may explore that option. However at this time she would prefer to keep appointment on 2/17/22 in [place].     Patient also stated that she will call insurance company regarding remote monitoring and call us back to confirm if she would like one sent to her. Patient appreciative for the call.

## 2022-01-23 NOTE — ANESTHESIA PREPROCEDURE EVALUATION
Anesthesia Evaluation     . Pt has had prior anesthetic. Type: General and MAC    No history of anesthetic complications          ROS/MED HX    ENT/Pulmonary:  - neg pulmonary ROS     Neurologic:  - neg neurologic ROS     Cardiovascular:     (+) ----. : . . fainting (syncope). pacemaker Reason placed: SSS:- Patient is dependent on pacemaker ICD . .       METS/Exercise Tolerance:     Hematologic:  - neg hematologic  ROS       Musculoskeletal:  - neg musculoskeletal ROS       GI/Hepatic:  - neg GI/hepatic ROS   (+) bowel prep,       Renal/Genitourinary:  - ROS Renal section negative       Endo:  - neg endo ROS       Psychiatric:  - neg psychiatric ROS       Infectious Disease:  - neg infectious disease ROS       Malignancy:      - no malignancy   Other:    (+) C-spine cleared: N/A, H/O Chronic Pain,                   Physical Exam  Normal systems: cardiovascular, pulmonary and dental    Airway   Mallampati: II  TM distance: <3 FB  Neck ROM: full    Dental     Cardiovascular   Rhythm and rate: regular and normal      Pulmonary    breath sounds clear to auscultation                    Anesthesia Plan      History & Physical Review  History and physical reviewed and following examination; no interval change.    ASA Status:  3 .    NPO Status:  > 6 hours    Plan for MAC with Intravenous and Propofol induction. Maintenance will be TIVA.  Reason for MAC:  Deep or markedly invasive procedure (G8)  PONV prophylaxis:  Ondansetron (or other 5HT-3)       Postoperative Care  Postoperative pain management:  IV analgesics.      Consents  Anesthetic plan, risks, benefits and alternatives discussed with:  Patient.  Use of blood products discussed: No .   .                          .  
No

## 2022-02-16 ENCOUNTER — MEDICAL CORRESPONDENCE (OUTPATIENT)
Dept: HEALTH INFORMATION MANAGEMENT | Facility: CLINIC | Age: 67
End: 2022-02-16
Payer: COMMERCIAL

## 2022-02-17 ENCOUNTER — ANCILLARY PROCEDURE (OUTPATIENT)
Dept: CARDIOLOGY | Facility: CLINIC | Age: 67
End: 2022-02-17
Attending: INTERNAL MEDICINE
Payer: COMMERCIAL

## 2022-02-17 ENCOUNTER — TELEPHONE (OUTPATIENT)
Dept: CARDIOLOGY | Facility: CLINIC | Age: 67
End: 2022-02-17

## 2022-02-17 DIAGNOSIS — E78.00 HYPERCHOLESTEROLEMIA: ICD-10-CM

## 2022-02-17 DIAGNOSIS — Z95.0 CARDIAC PACEMAKER IN SITU: ICD-10-CM

## 2022-02-17 DIAGNOSIS — I25.10 CORONARY ARTERY DISEASE INVOLVING NATIVE CORONARY ARTERY OF NATIVE HEART WITHOUT ANGINA PECTORIS: ICD-10-CM

## 2022-02-17 DIAGNOSIS — I25.10 CORONARY ARTERY DISEASE INVOLVING NATIVE CORONARY ARTERY OF NATIVE HEART WITHOUT ANGINA PECTORIS: Primary | ICD-10-CM

## 2022-02-17 DIAGNOSIS — I48.0 PAROXYSMAL ATRIAL FIBRILLATION (H): ICD-10-CM

## 2022-02-17 DIAGNOSIS — I49.5 SINUS NODE DYSFUNCTION (H): ICD-10-CM

## 2022-02-17 PROCEDURE — 93280 PM DEVICE PROGR EVAL DUAL: CPT | Performed by: INTERNAL MEDICINE

## 2022-02-17 NOTE — TELEPHONE ENCOUNTER
"Pt had in-clinic PPM check today. She has not had her PPM checked since 8/30/2018. Pt had some possible AF episodes and also episodes of NSVT noted in the past 3.5 yrs. She denies any symptoms.     Atrial Arrhythmia:   21 episodes since last check in 2018, 2 EGMs available, both show brief PAT <10 seconds.  There are some longer episodes saved that do not have EGMs:   11.5 hour episode on 1/27/2019 (Max A rate >400 bpm, Average V rate 113 bpm, no EGM)   8.5 hour episode on 11/17/2020 (Max A rate >400 bpm, Average V rate 118 bpm, no EGM).   With data provided, these episodes are likely PAF, but we do not have EGMs for proof. Will send update to Dr. Lomeli. Pt denied any symptoms. AF burden 0.1%.   Ventricular Arrhythmia:   10 episodes since last check in 2018, 3 EGMs available.   1 EGM shows brief PAT.   2 other EGMs show 6-10 beats of NSVT at -240 bpm. The two NSVT episodes occurred 12/18/2019 at 4:49pm and 2/14/2022 at 1:57pm. Pt denies symptoms. She had a stress echo in 7/2021, her resting baseline EF was 55-60%.       Pt saw Dr. Lomeli on 7/13/2021, per his OV note: \"At present, I cannot confirm a diagnosis of atrial fibrillation, but fortunately the device will allow us to monitor the patient long-term for any evidence of atrial fibrillation.  Her CHADS-VASc score would be at least 3 based on age, gender, and the presence of vascular disease.\"    Looking back at previous device checks, there is an EGM from the 8/2018 check showing AF, this EGM is from 9/11/2017, and the duration was 2 hrs 4 min.           Will send update to Dr. Lomeli about AF episodes and NSVT.   "

## 2022-02-18 NOTE — TELEPHONE ENCOUNTER
"Per , \"Thank you for your message Francoise. Her CHADS-VASc = 3 so we need to discuss beginning an anticoagulant. I message Katerine to arrange a visit with myself or an ROBERT to discuss anticoagulation now that we have documented atrial fibrillation. \"      Placed orders for follow up.  Pt to call scheduling line. Pt did not answer call. Left detailed message with recommendations to follow up and scheduling phone number. Callback number provided if pt has further questions or concerns. Sent message to [facility] to reach out to pt as well.     KELLY Barrios    "

## 2022-02-22 NOTE — PROGRESS NOTES
Cardiology Clinic Progress Note  Tatiana Skelton MRN# [id number]   YOB: 1955 Age: 66 year old     Primary cardiologist: Dr. Lomeli    Reason for visit: Newly diagnosed atrial fibrillation    History of presenting illness:    Tatiana Skelton, a pleasant 66 year old patient who has a past medical history significant for documented coronary artery atherosclerosis, hypercholesterolemia, bradycardia with syncope status post dual-chamber permanent pacemaker in 2002 with generator replacement in 2012.     In June 2021 she underwent a CT coronary calcium score that demonstrated a total score of 609 placing her in the 97 percentile for matched age and gender group.  She underwent a stress echocardiogram in July 2021 that was normal and LVEF was 55 to 60%.  A previous fasting lipid profile showed total cholesterol 285, , HDL 50 and triglycerides 103 and she was placed on atorvastatin 10 mg daily and aspirin.    Recent pacemaker check on 2/17/2022 revealed 2 episodes of atrial fibrillation first in January 2019 and second in November 2020 lasting 11-1/2 hours and 8-1/2 hours respectively.  There were 2 episodes of nonsustained VT with heart rate between 190 and 240 occurring in December 2019 and most recently on 2/14/2022.  Of note, the the patient's the device had not been checked since 2018 and she was recently reenrolled in our pacemaker clinic.    Today she presents to clinic to discuss the atrial fibrillation noted on her device check.  We spent a large part of our discussion today talking about the rationale for anticoagulation and different anticoagulation options including DOAC versus warfarin.  Also, she does note that her episodes of atrial fibrillation in November 2020 correlated with her being diagnosed with COVID-19 and she was quite symptomatic at that time.  Ultimately we decided to pursue a DOAC specifically Xarelto.  Our pharmacy liaison kindly assessed the cost of the  medications and Eliquis and Xarelto will be $65 a month but the patient may obtain a $10 co-pay card from the 's website.         Assessment and Plan:     ASSESSMENT:    1. Paroxysmal atrial fibrillation    Noted on device checks with instances correlating with COVID-19 infection in November 2020     Previously did not tolerate metoprolol due to profound fatigue and of AV  jade options are needed in the future could consider calcium channel blockers    [id number]-[id number] score of 3     2. Coronary artery calcifications    CT calcium score showed total score of 609 (E left main 101, LAD 48, left circumflex 0 and RCA 20) placing her in the 97th percentile for age and gender    Most recent fasting lipid profile obtained from care everywhere showed total cholesterol 285, triglycerides 50,  and .    Initiated on aspirin and currently on rosuvastatin 40 mg daily    3. History of syncope and bradycardia    Status post permanent pacemaker placement in 2002 in [place]    Previously lost to follow-up in device clinic due to family situation and most recent device check was completed on 2/17/2022    PLAN:     1. Start Xarelto 20 mg daily for anticoagulation instead of paroxysmal atrial fibrillation  2. Routine follow-up with device clinic  3. Fasting lipid profile at patient's convenience  4. Return to clinic in 6 months or sooner if needed       Orders this Visit:  Orders Placed This Encounter   Procedures     Lipid Profile     Follow-Up with Cardiology     EKG 12-LEAD CLINIC READ (Brian)- performed today     Orders Placed This Encounter   Medications     hydrochlorothiazide (HYDRODIURIL) 12.5 MG tablet     Sig: Take 12.5-25 mg by mouth     rivaroxaban ANTICOAGULANT (XARELTO ANTICOAGULANT) 20 MG TABS tablet     Sig: Take 1 tablet (20 mg) by mouth daily (with dinner)     Dispense:  90 tablet     Refill:  3     There are no discontinued medications.    Today's clinic visit entailed:  Review  "of prior external note(s) from - [facility]where [facility] from [facility] reviewed  Review of the result(s) of each unique test - CT angiogram, echocardiogram, stress echocardiogram, FLP, BMP, device check  Ordering of each unique test  Prescription drug management  30 minutes spent on the date of the encounter doing chart review, history and exam, documentation and further activities per the note  Provider  Link to [facility] Help Grid     The level of medical decision making during this visit was of moderate complexity.           Review of Systems:     Review of Systems:  Skin:        Eyes:  Positive for glasses  ENT:       Respiratory:  Positive for cough  Cardiovascular:  Negative;palpitations;chest pain;lightheadedness;dizziness;syncope or near-syncope Positive for;edema  Gastroenterology:      Genitourinary:       Musculoskeletal:       Neurologic:  Positive for numbness or tingling of feet  Psychiatric:       Heme/Lymph/Imm:  Positive for allergies  Endocrine:                 Physical Exam:   Vitals: /70 (BP Location: Right arm, Patient Position: Sitting, Cuff Size: Adult Regular)   Pulse 84   Ht 1.74 m (5' 8.5\")   Wt 76.6 kg (168 lb 12.8 oz)   LMP  (LMP Unknown)   SpO2 95%   BMI 25.29 kg/m    Constitutional:  cooperative, alert and oriented, well developed, well nourished, in no acute distress        Skin:  warm and dry to the touch, no apparent skin lesions or masses noted        Head:  normocephalic, no masses or lesions        ENT:  no pallor or cyanosis, dentition good        Neck:  carotid pulses are full and equal bilaterally, JVP normal, no carotid bruit        Chest:  normal symmetry        Cardiac: regular rhythm                  Abdomen:  not assessed this visit        Extremities and Back:  no edema        Neurological:  no gross motor deficits             Medications:     Current Outpatient Medications   Medication Sig Dispense Refill     aspirin (ASA) 81 MG chewable tablet Take 81 mg by " mouth daily       hydrochlorothiazide (HYDRODIURIL) 12.5 MG tablet Take 12.5-25 mg by mouth       mometasone (ELOCON) 0.1 % external ointment Apply sparingly to affected area twice daily as needed.  Do not apply to face. 45 g 1     rivaroxaban ANTICOAGULANT (XARELTO ANTICOAGULANT) 20 MG TABS tablet Take 1 tablet (20 mg) by mouth daily (with dinner) 90 tablet 3     rosuvastatin (CRESTOR) 20 MG tablet Take 2 tablets (40 mg) by mouth daily 60 tablet 11       Family History   Problem Relation Age of Onset     Respiratory Mother 61        COPD     Heart Disease Mother      Eye Disorder Mother         cataract surgery     Lipids Mother      Diabetes Mother      Hypertension Mother      Eye Disorder Father      Glaucoma Father 80        takes drops, frequent apt - suspect it is glaucoma     Hypertension Sister      Cancer Sister      Lipids Brother      Hypertension Brother      Lipids Sister      Cerebrovascular Disease No family hx of      Thyroid Disease No family hx of      Macular Degeneration No family hx of        Social History     Socioeconomic History     Marital status: Single     Spouse name: Not on file     Number of children: Not on file     Years of education: Not on file     Highest education level: Not on file   Occupational History     Not on file   Tobacco Use     Smoking status: Never Smoker     Smokeless tobacco: Never Used     Tobacco comment: Lives in smoke free household   Substance and Sexual Activity     Alcohol use: Yes     Comment: twice a month     Drug use: No     Sexual activity: Yes     Partners: Male     Birth control/protection: Surgical, Female Surgical     Comment: tubal ligation   Other Topics Concern     Parent/sibling w/ CABG, MI or angioplasty before 65F 55M? Not Asked   Social History Narrative     Not on file     Social Determinants of Health     Financial Resource Strain: Not on file   Food Insecurity: Not on file   Transportation Needs: Not on file   Physical Activity: Not on  file   Stress: Not on file   Social Connections: Not on file   Intimate Partner Violence: Not on file   Housing Stability: Not on file            Past Medical History:     Past Medical History:   Diagnosis Date     Cervical high risk HPV (human papillomavirus) test positive 10/2/14, 3/6/16     Elevated cholesterol      Generalized osteoarthrosis, unspecified site 1/30/2015     Open angle with borderline findings, low risk 1/9/2014     Sick sinus syndrome (H)     PM implant since 2002     Syncope               Past Surgical History:     Past Surgical History:   Procedure Laterality Date     ABDOMEN SURGERY      Tubal     BUNIONECTOMY JACKIE BILATERAL       COLONOSCOPY N/A 8/20/2018    Procedure: COLONOSCOPY;  COLONOSCOPY;  Surgeon: Manas Lee DO;  Location:  GI     HC OR IMPLANT BREAST       IMPLANT IMPLANTABLE CARDIOVERTER DEFIBRILLATOR  6/4/2012    she does not have an ICD. She has a pacemker.     IMPLANT PACEMAKER  2002    Dual chamber medtronic for sick sinus snyndrome     TUBAL LIGATION  2000              Allergies:   Versed and Morphine sulfate       Data:   All laboratory data reviewed:    Recent Labs   Lab Test 12/24/18  0814 12/21/17  0811 12/09/16  0806 04/07/16  0810 08/25/14  1056 08/25/14  1056 08/25/14  1055   * 119* 106* 129*   < > 92  --    HDL 94 107 113 101   < > 98  --    NHDL 120 132* 115 141*   < >  --   --    CHOL 214* 239* 228* 242*   < > 201*  --    TRIG 74 63 47 58   < > 53  --    TSH  --   --  3.57 2.90  --  1.74  --    NTBNP  --   --   --   --   --   --  168*    < > = values in this interval not displayed.       Lab Results   Component Value Date    WBC 4.3 01/30/2015    RBC 4.62 01/30/2015    HGB 14.3 01/30/2015    HCT 40.4 01/30/2015    MCV 87 01/30/2015    MCH 31.0 01/30/2015    MCHC 35.4 01/30/2015    RDW 13.6 01/30/2015     01/30/2015       Lab Results   Component Value Date     04/07/2016    POTASSIUM 3.8 04/07/2016    CHLORIDE 106 04/07/2016     CO2 29 04/07/2016    ANIONGAP 9 04/07/2016    GLC 88 12/24/2018    BUN 19 04/07/2016    CR 0.78 04/07/2016    GFRESTIMATED 75 04/07/2016    GFRESTBLACK >90   GFR Calc   04/07/2016    SONIA 9.7 04/07/2016      Lab Results   Component Value Date    AST 15 01/30/2015    ALT 17 01/30/2015       No results found for: A1C    No results found for: INR      HERMILA VILLEGAS [facility]  Pager: [phone]  RN phone: [phone]

## 2022-02-23 ENCOUNTER — OFFICE VISIT (OUTPATIENT)
Dept: CARDIOLOGY | Facility: CLINIC | Age: 67
End: 2022-02-23
Payer: COMMERCIAL

## 2022-02-23 ENCOUNTER — HOSPITAL ENCOUNTER (OUTPATIENT)
Dept: CARDIOLOGY | Facility: CLINIC | Age: 67
Discharge: HOME | End: 2022-02-23
Attending: NURSE PRACTITIONER | Admitting: NURSE PRACTITIONER
Payer: COMMERCIAL

## 2022-02-23 VITALS
SYSTOLIC BLOOD PRESSURE: 114 MMHG | WEIGHT: 168.8 LBS | DIASTOLIC BLOOD PRESSURE: 70 MMHG | HEIGHT: 69 IN | OXYGEN SATURATION: 95 % | HEART RATE: 84 BPM | BODY MASS INDEX: 25 KG/M2

## 2022-02-23 DIAGNOSIS — I48.0 PAROXYSMAL ATRIAL FIBRILLATION (H): ICD-10-CM

## 2022-02-23 DIAGNOSIS — I25.10 CORONARY ARTERY DISEASE INVOLVING NATIVE CORONARY ARTERY OF NATIVE HEART WITHOUT ANGINA PECTORIS: Primary | ICD-10-CM

## 2022-02-23 DIAGNOSIS — E78.00 HYPERCHOLESTEROLEMIA: ICD-10-CM

## 2022-02-23 LAB
MDC_IDC_LEAD_IMPLANT_DT: NORMAL
MDC_IDC_LEAD_IMPLANT_DT: NORMAL
MDC_IDC_LEAD_LOCATION: NORMAL
MDC_IDC_LEAD_LOCATION: NORMAL
MDC_IDC_LEAD_LOCATION_DETAIL_1: NORMAL
MDC_IDC_LEAD_LOCATION_DETAIL_1: NORMAL
MDC_IDC_LEAD_MFG: NORMAL
MDC_IDC_LEAD_MFG: NORMAL
MDC_IDC_LEAD_MODEL: NORMAL
MDC_IDC_LEAD_MODEL: NORMAL
MDC_IDC_LEAD_POLARITY_TYPE: NORMAL
MDC_IDC_LEAD_POLARITY_TYPE: NORMAL
MDC_IDC_LEAD_SERIAL: NORMAL
MDC_IDC_LEAD_SERIAL: NORMAL
MDC_IDC_LEAD_SPECIAL_FUNCTION: NORMAL
MDC_IDC_MSMT_BATTERY_DTM: NORMAL
MDC_IDC_MSMT_BATTERY_IMPEDANCE: 636 OHM
MDC_IDC_MSMT_BATTERY_REMAINING_LONGEVITY: 100 MO
MDC_IDC_MSMT_BATTERY_STATUS: NORMAL
MDC_IDC_MSMT_BATTERY_VOLTAGE: 2.78 V
MDC_IDC_MSMT_LEADCHNL_RA_IMPEDANCE_VALUE: 459 OHM
MDC_IDC_MSMT_LEADCHNL_RA_PACING_THRESHOLD_AMPLITUDE: 0.5 V
MDC_IDC_MSMT_LEADCHNL_RA_PACING_THRESHOLD_AMPLITUDE: 0.5 V
MDC_IDC_MSMT_LEADCHNL_RA_PACING_THRESHOLD_PULSEWIDTH: 0.4 MS
MDC_IDC_MSMT_LEADCHNL_RA_PACING_THRESHOLD_PULSEWIDTH: 0.4 MS
MDC_IDC_MSMT_LEADCHNL_RA_SENSING_INTR_AMPL: 2.8 MV
MDC_IDC_MSMT_LEADCHNL_RV_IMPEDANCE_VALUE: 699 OHM
MDC_IDC_MSMT_LEADCHNL_RV_PACING_THRESHOLD_AMPLITUDE: 1 V
MDC_IDC_MSMT_LEADCHNL_RV_PACING_THRESHOLD_AMPLITUDE: 1 V
MDC_IDC_MSMT_LEADCHNL_RV_PACING_THRESHOLD_PULSEWIDTH: 0.4 MS
MDC_IDC_MSMT_LEADCHNL_RV_PACING_THRESHOLD_PULSEWIDTH: 0.4 MS
MDC_IDC_MSMT_LEADCHNL_RV_SENSING_INTR_AMPL: 5.6 MV
MDC_IDC_PG_IMPLANT_DTM: NORMAL
MDC_IDC_PG_MFG: NORMAL
MDC_IDC_PG_MODEL: NORMAL
MDC_IDC_PG_SERIAL: NORMAL
MDC_IDC_PG_TYPE: NORMAL
MDC_IDC_SESS_CLINIC_NAME: NORMAL
MDC_IDC_SESS_DTM: NORMAL
MDC_IDC_SESS_TYPE: NORMAL
MDC_IDC_SET_BRADY_AT_MODE_SWITCH_MODE: NORMAL
MDC_IDC_SET_BRADY_AT_MODE_SWITCH_RATE: 175 {BEATS}/MIN
MDC_IDC_SET_BRADY_LOWRATE: 50 {BEATS}/MIN
MDC_IDC_SET_BRADY_MAX_SENSOR_RATE: 130 {BEATS}/MIN
MDC_IDC_SET_BRADY_MAX_TRACKING_RATE: 130 {BEATS}/MIN
MDC_IDC_SET_BRADY_MODE: NORMAL
MDC_IDC_SET_BRADY_PAV_DELAY_LOW: 150 MS
MDC_IDC_SET_BRADY_SAV_DELAY_LOW: 120 MS
MDC_IDC_SET_LEADCHNL_RA_PACING_AMPLITUDE: 1.5 V
MDC_IDC_SET_LEADCHNL_RA_PACING_CAPTURE_MODE: NORMAL
MDC_IDC_SET_LEADCHNL_RA_PACING_POLARITY: NORMAL
MDC_IDC_SET_LEADCHNL_RA_PACING_PULSEWIDTH: 0.4 MS
MDC_IDC_SET_LEADCHNL_RA_SENSING_POLARITY: NORMAL
MDC_IDC_SET_LEADCHNL_RA_SENSING_SENSITIVITY: 0.5 MV
MDC_IDC_SET_LEADCHNL_RV_PACING_AMPLITUDE: 2 V
MDC_IDC_SET_LEADCHNL_RV_PACING_CAPTURE_MODE: NORMAL
MDC_IDC_SET_LEADCHNL_RV_PACING_POLARITY: NORMAL
MDC_IDC_SET_LEADCHNL_RV_PACING_PULSEWIDTH: 0.4 MS
MDC_IDC_SET_LEADCHNL_RV_SENSING_POLARITY: NORMAL
MDC_IDC_SET_LEADCHNL_RV_SENSING_SENSITIVITY: 2.8 MV
MDC_IDC_SET_ZONE_DETECTION_INTERVAL: 333.33 MS
MDC_IDC_SET_ZONE_DETECTION_INTERVAL: 342.86 MS
MDC_IDC_SET_ZONE_TYPE: NORMAL
MDC_IDC_SET_ZONE_TYPE: NORMAL
MDC_IDC_STAT_AT_BURDEN_PERCENT: 0.1 %
MDC_IDC_STAT_AT_DTM_END: NORMAL
MDC_IDC_STAT_AT_DTM_START: NORMAL
MDC_IDC_STAT_AT_MODE_SW_COUNT: 240
MDC_IDC_STAT_BRADY_AP_VP_PERCENT: 0 %
MDC_IDC_STAT_BRADY_AP_VS_PERCENT: 5 %
MDC_IDC_STAT_BRADY_AS_VP_PERCENT: 0 %
MDC_IDC_STAT_BRADY_AS_VS_PERCENT: 95 %
MDC_IDC_STAT_BRADY_DTM_END: NORMAL
MDC_IDC_STAT_BRADY_DTM_START: NORMAL
MDC_IDC_STAT_EPISODE_RECENT_COUNT: 10
MDC_IDC_STAT_EPISODE_RECENT_COUNT: 21
MDC_IDC_STAT_EPISODE_RECENT_COUNT_DTM_END: NORMAL
MDC_IDC_STAT_EPISODE_RECENT_COUNT_DTM_END: NORMAL
MDC_IDC_STAT_EPISODE_RECENT_COUNT_DTM_START: NORMAL
MDC_IDC_STAT_EPISODE_RECENT_COUNT_DTM_START: NORMAL
MDC_IDC_STAT_EPISODE_TYPE: NORMAL
MDC_IDC_STAT_EPISODE_TYPE: NORMAL

## 2022-02-23 PROCEDURE — 93010 ELECTROCARDIOGRAM REPORT: CPT | Performed by: NURSE PRACTITIONER

## 2022-02-23 PROCEDURE — 93005 ELECTROCARDIOGRAM TRACING: CPT | Performed by: REHABILITATION PRACTITIONER

## 2022-02-23 PROCEDURE — 99214 OFFICE O/P EST MOD 30 MIN: CPT | Mod: 25 | Performed by: NURSE PRACTITIONER

## 2022-02-23 RX ORDER — HYDROCHLOROTHIAZIDE 12.5 MG/1
12.5-25 TABLET ORAL
COMMUNITY
Start: 2021-07-23 | End: 2022-08-11

## 2022-02-23 NOTE — LETTER
2/23/2022    Alcides Foster MD  [facility] [address] [facility] [address]    RE: Tatiana Skelton       Dear Colleague,     I had the pleasure of seeing Tat[facility]el[facility] in the [facility].    Cardiology Clinic Progress Note  Tatiana Skelton MRN# [id number]   YOB: 1955 Age: 66 year old     Primary cardiologist: Dr. Lomeli    Reason for visit: Newly diagnosed atrial fibrillation    History of presenting illness:    Tatiana Skelton, a pleasant 66 year old patient who has a past medical history significant for documented coronary artery atherosclerosis, hypercholesterolemia, bradycardia with syncope status post dual-chamber permanent pacemaker in 2002 with generator replacement in 2012.     In June 2021 she underwent a CT coronary calcium score that demonstrated a total score of 609 placing her in the 97 percentile for matched age and gender group.  She underwent a stress echocardiogram in July 2021 that was normal and LVEF was 55 to 60%.  A previous fasting lipid profile showed total cholesterol 285, , HDL 50 and triglycerides 103 and she was placed on atorvastatin 10 mg daily and aspirin.    Recent pacemaker check on 2/17/2022 revealed 2 episodes of atrial fibrillation first in January 2019 and second in November 2020 lasting 11-1/2 hours and 8-1/2 hours respectively.  There were 2 episodes of nonsustained VT with heart rate between 190 and 240 occurring in December 2019 and most recently on 2/14/2022.  Of note, the the patient's the device had not been checked since 2018 and she was recently reenrolled in our pacemaker clinic.    Today she presents to clinic to discuss the atrial fibrillation noted on her device check.  We spent a large part of our discussion today talking about the rationale for anticoagulation and different anticoagulation options including DOAC versus warfarin.  Also, she does note that her episodes of atrial fibrillation  in November 2020 correlated with her being diagnosed with COVID-19 and she was quite symptomatic at that time.  Ultimately we decided to pursue a DOAC specifically Xarelto.  Our pharmacy liaison kindly assessed the cost of the medications and Eliquis and Xarelto will be $65 a month but the patient may obtain a $10 co-pay card from the 's website.         Assessment and Plan:     ASSESSMENT:    1. Paroxysmal atrial fibrillation    Noted on device checks with instances correlating with COVID-19 infection in November 2020     Previously did not tolerate metoprolol due to profound fatigue and of AV  jade options are needed in the future could consider calcium channel blockers    [id number] score of 3     2. Coronary artery calcifications    CT calcium score showed total score of 609 (E left main 101, LAD 48, left circumflex 0 and RCA 20) placing her in the 97th percentile for age and gender    Most recent fasting lipid profile obtained from care everywhere showed total cholesterol 285, triglycerides 50,  and .    Initiated on aspirin and currently on rosuvastatin 40 mg daily    3. History of syncope and bradycardia    Status post permanent pacemaker placement in 2002 in [place]    Previously lost to follow-up in device clinic due to family situation and most recent device check was completed on 2/17/2022    PLAN:     1. Start Xarelto 20 mg daily for anticoagulation instead of paroxysmal atrial fibrillation  2. Routine follow-up with device clinic  3. Fasting lipid profile at patient's convenience  4. Return to clinic in 6 months or sooner if needed       Orders this Visit:  Orders Placed This Encounter   Procedures     Lipid Profile     Follow-Up with Cardiology     EKG 12-LEAD CLINIC READ ([facility])- performed today     Orders Placed This Encounter   Medications     hydrochlorothiazide (HYDRODIURIL) 12.5 MG tablet     Sig: Take 12.5-25 mg by mouth     rivaroxaban ANTICOAGULANT  "(XARELTO ANTICOAGULANT) 20 MG TABS tablet     Sig: Take 1 tablet (20 mg) by mouth daily (with dinner)     Dispense:  90 tablet     Refill:  3     There are no discontinued medications.    Today's clinic visit entailed:  Review of prior external note(s) from [facility]  Review of the result(s) of each unique test - CT angiogram, echocardiogram, stress echocardiogram, FLP, BMP, device check  Ordering of each unique test  Prescription drug management  30 minutes spent on the date of the encounter doing chart review, history and exam, documentation and further activities per the note  Provider  Link to [facility] Help Grid     The level of medical decision making during this visit was of moderate complexity.           Review of Systems:     Review of Systems:  Skin:        Eyes:  Positive for glasses  ENT:       Respiratory:  Positive for cough  Cardiovascular:  Negative;palpitations;chest pain;lightheadedness;dizziness;syncope or near-syncope Positive for;edema  Gastroenterology:      Genitourinary:       Musculoskeletal:       Neurologic:  Positive for numbness or tingling of feet  Psychiatric:       Heme/Lymph/Imm:  Positive for allergies  Endocrine:                 Physical Exam:   Vitals: /70 (BP Location: Right arm, Patient Position: Sitting, Cuff Size: Adult Regular)   Pulse 84   Ht 1.74 m (5' 8.5\")   Wt 76.6 kg (168 lb 12.8 oz)   LMP  (LMP Unknown)   SpO2 95%   BMI 25.29 kg/m    Constitutional:  cooperative, alert and oriented, well developed, well nourished, in no acute distress        Skin:  warm and dry to the touch, no apparent skin lesions or masses noted        Head:  normocephalic, no masses or lesions        ENT:  no pallor or cyanosis, dentition good        Neck:  carotid pulses are full and equal bilaterally, JVP normal, no carotid bruit        Chest:  normal symmetry        Cardiac: regular rhythm                  Abdomen:  not assessed this visit    "     Extremities and Back:  no edema        Neurological:  no gross motor deficits             Medications:     Current Outpatient Medications   Medication Sig Dispense Refill     aspirin (ASA) 81 MG chewable tablet Take 81 mg by mouth daily       hydrochlorothiazide (HYDRODIURIL) 12.5 MG tablet Take 12.5-25 mg by mouth       mometasone (ELOCON) 0.1 % external ointment Apply sparingly to affected area twice daily as needed.  Do not apply to face. 45 g 1     rivaroxaban ANTICOAGULANT (XARELTO ANTICOAGULANT) 20 MG TABS tablet Take 1 tablet (20 mg) by mouth daily (with dinner) 90 tablet 3     rosuvastatin (CRESTOR) 20 MG tablet Take 2 tablets (40 mg) by mouth daily 60 tablet 11       Family History   Problem Relation Age of Onset     Respiratory Mother 61        COPD     Heart Disease Mother      Eye Disorder Mother         cataract surgery     Lipids Mother      Diabetes Mother      Hypertension Mother      Eye Disorder Father      Glaucoma Father 80        takes drops, frequent apt - suspect it is glaucoma     Hypertension Sister      Cancer Sister      Lipids Brother      Hypertension Brother      Lipids Sister      Cerebrovascular Disease No family hx of      Thyroid Disease No family hx of      Macular Degeneration No family hx of        Social History     Socioeconomic History     Marital status: Single     Spouse name: Not on file     Number of children: Not on file     Years of education: Not on file     Highest education level: Not on file   Occupational History     Not on file   Tobacco Use     Smoking status: Never Smoker     Smokeless tobacco: Never Used     Tobacco comment: Lives in smoke free household   Substance and Sexual Activity     Alcohol use: Yes     Comment: twice a month     Drug use: No     Sexual activity: Yes     Partners: Male     Birth control/protection: Surgical, Female Surgical     Comment: tubal ligation   Other Topics Concern     Parent/sibling w/ CABG, MI or angioplasty before 65F 55M?  Not Asked   Social History Narrative     Not on file     Social Determinants of Health     Financial Resource Strain: Not on file   Food Insecurity: Not on file   Transportation Needs: Not on file   Physical Activity: Not on file   Stress: Not on file   Social Connections: Not on file   Intimate Partner Violence: Not on file   Housing Stability: Not on file            Past Medical History:     Past Medical History:   Diagnosis Date     Cervical high risk HPV (human papillomavirus) test positive 10/2/14, 3/6/16     Elevated cholesterol      Generalized osteoarthrosis, unspecified site 1/30/2015     Open angle with borderline findings, low risk 1/9/2014     Sick sinus syndrome (H)     PM implant since 2002     Syncope               Past Surgical History:     Past Surgical History:   Procedure Laterality Date     ABDOMEN SURGERY      Tubal     BUNIONECTOMY JACKIE BILATERAL       COLONOSCOPY N/A 8/20/2018    Procedure: COLONOSCOPY;  COLONOSCOPY;  Surgeon: Manas Lee DO;  Location: [facility] OR IMPLANT BREAST       IMPLANT IMPLANTABLE CARDIOVERTER DEFIBRILLATOR  6/4/2012    she does not have an ICD. She has a pacemker.     IMPLANT PACEMAKER  2002    Dual chamber medtronic for sick sinus snyndrome     TUBAL LIGATION  2000              Allergies:   Versed and Morphine sulfate       Data:   All laboratory data reviewed:    Recent Labs   Lab Test 12/24/18  0814 12/21/17  0811 12/09/16  0806 04/07/16  0810 08/25/14  1056 08/25/14  1056 08/25/14  1055   * 119* 106* 129*   < > 92  --    HDL 94 107 113 101   < > 98  --    NHDL 120 132* 115 141*   < >  --   --    CHOL 214* 239* 228* 242*   < > 201*  --    TRIG 74 63 47 58   < > 53  --    TSH  --   --  3.57 2.90  --  1.74  --    NTBNP  --   --   --   --   --   --  168*    < > = values in this interval not displayed.       Lab Results   Component Value Date    WBC 4.3 01/30/2015    RBC 4.62 01/30/2015    HGB 14.3 01/30/2015    HCT 40.4 01/30/2015    MCV  87 01/30/2015    MCH 31.0 01/30/2015    MCHC 35.4 01/30/2015    RDW 13.6 01/30/2015     01/30/2015       Lab Results   Component Value Date     04/07/2016    POTASSIUM 3.8 04/07/2016    CHLORIDE 106 04/07/2016    CO2 29 04/07/2016    ANIONGAP 9 04/07/2016    GLC 88 12/24/2018    BUN 19 04/07/2016    CR 0.78 04/07/2016    GFRESTIMATED 75 04/07/2016    GFRESTBLACK >90   GFR Calc   04/07/2016    SONIA 9.7 04/07/2016      Lab Results   Component Value Date    AST 15 01/30/2015    ALT 17 01/30/2015       No results found for: A1C    No results found for: INR      HERMILA VILLEGAS CNP  [facility]  Pager: [phone]

## 2022-02-23 NOTE — PATIENT INSTRUCTIONS
TODAY'S RECOMMENDATIONS    1. Start Xarelto 20 mg daily at night  2. Eliquis/Xarelto: $65/mo.  Copay cards to reduce this to $10/mo are available on [email or web address] and [email or web address], respectively.      If you have questions or concerns please call clinic at [phone].    Please call [phone] for scheduling.      It was a pleasure seeing you today!

## 2022-02-24 ENCOUNTER — LAB (OUTPATIENT)
Dept: LAB | Facility: CLINIC | Age: 67
End: 2022-02-24
Payer: COMMERCIAL

## 2022-02-24 DIAGNOSIS — E78.00 HYPERCHOLESTEROLEMIA: ICD-10-CM

## 2022-02-24 DIAGNOSIS — I48.0 PAROXYSMAL ATRIAL FIBRILLATION (H): ICD-10-CM

## 2022-02-24 DIAGNOSIS — I25.10 CORONARY ARTERY DISEASE INVOLVING NATIVE CORONARY ARTERY OF NATIVE HEART WITHOUT ANGINA PECTORIS: ICD-10-CM

## 2022-02-24 LAB
CHOLEST SERPL-MCNC: 156 MG/DL
FASTING STATUS PATIENT QL REPORTED: YES
HDLC SERPL-MCNC: 101 MG/DL
LDLC SERPL CALC-MCNC: 44 MG/DL
NONHDLC SERPL-MCNC: 55 MG/DL
TRIGL SERPL-MCNC: 55 MG/DL

## 2022-02-24 PROCEDURE — 36415 COLL VENOUS BLD VENIPUNCTURE: CPT

## 2022-02-24 PROCEDURE — 80061 LIPID PANEL: CPT | Performed by: NURSE PRACTITIONER

## 2022-02-28 ENCOUNTER — TELEPHONE (OUTPATIENT)
Dept: CARDIOLOGY | Facility: CLINIC | Age: 67
End: 2022-02-28
Payer: COMMERCIAL

## 2022-04-20 ENCOUNTER — HOSPITAL ENCOUNTER (OUTPATIENT)
Dept: ULTRASOUND IMAGING | Facility: CLINIC | Age: 67
Discharge: HOME | End: 2022-04-20
Attending: OBSTETRICS & GYNECOLOGY | Admitting: OBSTETRICS & GYNECOLOGY
Payer: COMMERCIAL

## 2022-04-20 DIAGNOSIS — R19.00 PELVIC MASS IN FEMALE: ICD-10-CM

## 2022-04-20 PROCEDURE — 76856 US EXAM PELVIC COMPLETE: CPT

## 2022-05-27 ENCOUNTER — OFFICE VISIT (OUTPATIENT)
Dept: FAMILY MEDICINE | Facility: CLINIC | Age: 67
End: 2022-05-27
Payer: COMMERCIAL

## 2022-05-27 VITALS
DIASTOLIC BLOOD PRESSURE: 68 MMHG | OXYGEN SATURATION: 97 % | HEIGHT: 68 IN | WEIGHT: 169.6 LBS | BODY MASS INDEX: 25.7 KG/M2 | TEMPERATURE: 98 F | RESPIRATION RATE: 16 BRPM | SYSTOLIC BLOOD PRESSURE: 112 MMHG | HEART RATE: 82 BPM

## 2022-05-27 DIAGNOSIS — Z00.00 ROUTINE GENERAL MEDICAL EXAMINATION AT A HEALTH CARE FACILITY: Primary | ICD-10-CM

## 2022-05-27 DIAGNOSIS — I48.0 PAROXYSMAL ATRIAL FIBRILLATION (H): ICD-10-CM

## 2022-05-27 DIAGNOSIS — Z80.49 FAMILY HISTORY OF UTERINE CANCER: ICD-10-CM

## 2022-05-27 DIAGNOSIS — I25.10 CORONARY ARTERY DISEASE INVOLVING NATIVE CORONARY ARTERY OF NATIVE HEART WITHOUT ANGINA PECTORIS: ICD-10-CM

## 2022-05-27 DIAGNOSIS — E78.00 HYPERCHOLESTEROLEMIA: ICD-10-CM

## 2022-05-27 DIAGNOSIS — Z12.4 SCREENING FOR CERVICAL CANCER: ICD-10-CM

## 2022-05-27 LAB
ALT SERPL W P-5'-P-CCNC: 27 U/L (ref 0–50)
CHOLEST SERPL-MCNC: 175 MG/DL
FASTING STATUS PATIENT QL REPORTED: YES
HDLC SERPL-MCNC: 109 MG/DL
LDLC SERPL CALC-MCNC: 53 MG/DL
NONHDLC SERPL-MCNC: 66 MG/DL
TRIGL SERPL-MCNC: 64 MG/DL

## 2022-05-27 PROCEDURE — 87624 HPV HI-RISK TYP POOLED RSLT: CPT | Performed by: NURSE PRACTITIONER

## 2022-05-27 PROCEDURE — 99396 PREV VISIT EST AGE 40-64: CPT | Performed by: NURSE PRACTITIONER

## 2022-05-27 PROCEDURE — 84460 ALANINE AMINO (ALT) (SGPT): CPT | Performed by: NURSE PRACTITIONER

## 2022-05-27 PROCEDURE — 80061 LIPID PANEL: CPT | Performed by: NURSE PRACTITIONER

## 2022-05-27 PROCEDURE — 36415 COLL VENOUS BLD VENIPUNCTURE: CPT | Performed by: NURSE PRACTITIONER

## 2022-05-27 PROCEDURE — G0123 SCREEN CERV/VAG THIN LAYER: HCPCS | Performed by: NURSE PRACTITIONER

## 2022-05-27 ASSESSMENT — ENCOUNTER SYMPTOMS
SHORTNESS OF BREATH: 0
HEADACHES: 0
PALPITATIONS: 0
FEVER: 0
MYALGIAS: 0
BREAST MASS: 0
NERVOUS/ANXIOUS: 0
ARTHRALGIAS: 1
HEMATOCHEZIA: 0
PARESTHESIAS: 0
HEMATURIA: 0
CHILLS: 0
WEAKNESS: 0
CONSTIPATION: 0
DIARRHEA: 0
NAUSEA: 0
SORE THROAT: 0
ABDOMINAL PAIN: 0
EYE PAIN: 0
DYSURIA: 0
JOINT SWELLING: 0
COUGH: 0
DIZZINESS: 0
FREQUENCY: 0
HEARTBURN: 0

## 2022-05-27 ASSESSMENT — PAIN SCALES - GENERAL: PAINLEVEL: NO PAIN (0)

## 2022-05-27 NOTE — PROGRESS NOTES
ref   SUBJECTIVE:   CC: Tatiana Skelton is an 66 year old woman who presents for preventive health visit.       Patient has been advised of split billing requirements and indicates understanding: Yes  Healthy Habits:     Getting at least 3 servings of Calcium per day:  NO    Bi-annual eye exam:  Yes    Dental care twice a year:  Yes    Sleep apnea or symptoms of sleep apnea:  Daytime drowsiness and Excessive snoring    Diet:  Low salt and Low fat/cholesterol    Frequency of exercise:  None    Taking medications regularly:  Yes    PHQ-2 Total Score: 2    Additional concerns today:  Yes          Today's PHQ-2 Score:   PHQ-2 ( 1999 Pfizer) 5/27/2022   Q1: Little interest or pleasure in doing things 2   Q2: Feeling down, depressed or hopeless 0   PHQ-2 Score 2   PHQ-2 Total Score (12-17 Years)- Positive if 3 or more points; Administer PHQ-A if positive -   Q1: Little interest or pleasure in doing things More than half the days   Q2: Feeling down, depressed or hopeless Not at all   PHQ-2 Score 2       Abuse: Current or Past (Physical, Sexual or Emotional) - No  Do you feel safe in your environment? Yes    Have you ever done Advance Care Planning? (For example, a Health Directive, POLST, or a discussion with a medical provider or your loved ones about your wishes): No, advance care planning information given to patient to review.  Patient declined advance care planning discussion at this time.    Social History     Tobacco Use     Smoking status: Never Smoker     Smokeless tobacco: Never Used     Tobacco comment: Lives in smoke free household   Substance Use Topics     Alcohol use: Yes     Comment: twice a month     If you drink alcohol do you typically have >3 drinks per day or >7 drinks per week? No    Alcohol Use 5/27/2022   Prescreen: >3 drinks/day or >7 drinks/week? No   Prescreen: >3 drinks/day or >7 drinks/week? -   No flowsheet data found.    Reviewed orders with patient.  Reviewed health maintenance and  updated orders accordingly - Yes  Lab work is in process  Labs reviewed in EPIC    Breast Cancer Screening:    FHS-7:   Breast CA Risk Assessment (FHS-7) 5/27/2022   Did any of your first-degree relatives have breast or ovarian cancer? Yes   Did any of your relatives have bilateral breast cancer? No   Did any man in your family have breast cancer? No   Did any woman in your family have breast and ovarian cancer? Yes   Did any woman in your family have breast cancer before age 50 y? Yes   Do you have 2 or more relatives with breast and/or ovarian cancer? Yes   Do you have 2 or more relatives with breast and/or bowel cancer? Yes     click delete button to remove this line now  Mammogram Screening: Recommended mammography every 1-2 years with patient discussion and risk factor consideration  Pertinent mammograms are reviewed under the imaging tab.    History of abnormal Pap smear: YES - updated in Problem List and Health Maintenance accordingly  PAP / HPV Latest Ref Rng & Units 12/17/2018 11/27/2017 4/6/2016   PAP (Historical) - NIL NIL NIL   HPV16 NEG:Negative Negative Negative Negative   HPV18 NEG:Negative Negative Negative Negative   HRHPV NEG:Negative Negative Negative Positive(A)     Reviewed and updated as needed this visit by clinical staff   Tobacco  Allergies  Meds     Fam Hx  Soc Hx          Reviewed and updated as needed this visit by Provider                   Past Medical History:   Diagnosis Date     Cervical high risk HPV (human papillomavirus) test positive 10/2/14, 3/6/16     Elevated cholesterol      Generalized osteoarthrosis, unspecified site 1/30/2015     Open angle with borderline findings, low risk 1/9/2014     Sick sinus syndrome (H)     PM implant since 2002     Syncope       Past Surgical History:   Procedure Laterality Date     ABDOMEN SURGERY      Tubal     BUNIONECTOMY JACKIE BILATERAL       COLONOSCOPY N/A 8/20/2018    Procedure: COLONOSCOPY;  COLONOSCOPY;  Surgeon: Jesus  "Manas Maynard, DO;  Location: [facility] OR IMPLANT BREAST       IMPLANT IMPLANTABLE CARDIOVERTER DEFIBRILLATOR  6/4/2012    she does not have an ICD. She has a pacemker.     IMPLANT PACEMAKER  2002    Dual chamber medtronic for sick sinus snyndrome     TUBAL LIGATION  2000       Review of Systems   Constitutional: Negative for chills and fever.   HENT: Positive for ear pain and hearing loss. Negative for congestion and sore throat.    Eyes: Positive for visual disturbance. Negative for pain.   Respiratory: Negative for cough and shortness of breath.    Cardiovascular: Positive for peripheral edema. Negative for chest pain and palpitations.   Gastrointestinal: Negative for abdominal pain, constipation, diarrhea, heartburn, hematochezia and nausea.   Breasts:  Negative for tenderness, breast mass and discharge.   Genitourinary: Positive for urgency. Negative for dysuria, frequency, genital sores, hematuria, pelvic pain, vaginal bleeding and vaginal discharge.   Musculoskeletal: Positive for arthralgias. Negative for joint swelling and myalgias.   Skin: Positive for rash.   Neurological: Negative for dizziness, weakness, headaches and paresthesias.   Psychiatric/Behavioral: Negative for mood changes. The patient is not nervous/anxious.           OBJECTIVE:   /68   Pulse 82   Temp 98  F (36.7  C) (Temporal)   Resp 16   Ht 1.722 m (5' 7.8\")   Wt 76.9 kg (169 lb 9.6 oz)   LMP  (LMP Unknown)   SpO2 97%   BMI 25.94 kg/m    Physical Exam  GENERAL: healthy, alert and no distress  EYES: Eyes grossly normal to inspection, PERRL and conjunctivae and sclerae normal  HENT: ear canals and TM's normal, nose and mouth without ulcers or lesions  NECK: no adenopathy, no asymmetry, masses, or scars and thyroid normal to palpation  RESP: lungs clear to auscultation - no rales, rhonchi or wheezes  BREAST: normal without masses, tenderness or nipple discharge and no palpable axillary masses or adenopathy  CV: regular rate " "and rhythm, normal S1 S2, no S3 or S4, no murmur, click or rub, no peripheral edema and peripheral pulses strong  ABDOMEN: soft, nontender, no hepatosplenomegaly, no masses and bowel sounds normal   (female): normal female external genitalia, normal urethral meatus, vaginal mucosa pink, moist, well rugated, and normal cervix/adnexa/uterus without masses or discharge  MS: no gross musculoskeletal defects noted, no edema  SKIN: no suspicious lesions or rashes  NEURO: Normal strength and tone, mentation intact and speech normal  PSYCH: mentation appears normal, affect normal/bright    Diagnostic Test Results:  Labs reviewed in Epic  none     ASSESSMENT/PLAN:   (Z00.00) Routine general medical examination at a health care facility  (primary encounter diagnosis)  Comment: patient did not want to address anything but her physical to avoid charge.      (I48.0) Paroxysmal atrial fibrillation (H)  Comment: followed by cardiology    (E78.00) Hypercholesterolemia  Comment: followed by cardiology    (Z80.49) Family history of uterine cancer  Comment: she is not due for a pap but wanted to due annually due to her sister's recent diagnosis of uterine cancer.  Discussed insurance may not cover and she would like to do testing  Plan: Pap screen with HPV - recommended age 30 - 65         years    (Z12.4) Screening for cervical cancer  Comment: as above  Plan: Pap screen with HPV - recommended age 30 - 65         years      Patient has been advised of split billing requirements and indicates understanding: yes- she only wanted physical done today    COUNSELING:  Reviewed preventive health counseling, as reflected in patient instructions    Estimated body mass index is 25.94 kg/m  as calculated from the following:    Height as of this encounter: 1.722 m (5' 7.8\").    Weight as of this encounter: 76.9 kg (169 lb 9.6 oz).        She reports that she has never smoked. She has never used smokeless tobacco.      Counseling " Resources:  ATP IV Guidelines  Pooled Cohorts Equation Calculator  Breast Cancer Risk Calculator  BRCA-Related Cancer Risk Assessment: FHS-7 Tool  FRAX Risk Assessment  ICSI Preventive Guidelines  Dietary Guidelines for Americans, 2010  USDA's MyPlate  ASA Prophylaxis  Lung CA Screening    Nicole Gray NP  [facility]

## 2022-05-27 NOTE — PATIENT INSTRUCTIONS
Get shingles shot at [facility]- Zostavax 2nd shot- last one 12/20/2018  Check tetanus  Preventive Health Recommendations  Female Ages 50 - 64    Yearly exam: See your health care provider every year in order to  o Review health changes.   o Discuss preventive care.    o Review your medicines if your doctor has prescribed any.      Get a Pap test every three years (unless you have an abnormal result and your provider advises testing more often).    If you get Pap tests with HPV test, you only need to test every 5 years, unless you have an abnormal result.     You do not need a Pap test if your uterus was removed (hysterectomy) and you have not had cancer.    You should be tested each year for STDs (sexually transmitted diseases) if you're at risk.     Have a mammogram every 1 to 2 years.    Have a colonoscopy at age 50, or have a yearly FIT test (stool test). These exams screen for colon cancer.      Have a cholesterol test every 5 years, or more often if advised.    Have a diabetes test (fasting glucose) every three years. If you are at risk for diabetes, you should have this test more often.     If you are at risk for osteoporosis (brittle bone disease), think about having a bone density scan (DEXA).    Shots: Get a flu shot each year. Get a tetanus shot every 10 years.    Nutrition:     Eat at least 5 servings of fruits and vegetables each day.    Eat whole-grain bread, whole-wheat pasta and brown rice instead of white grains and rice.    Get adequate Calcium and Vitamin D.     Lifestyle    Exercise at least 150 minutes a week (30 minutes a day, 5 days a week). This will help you control your weight and prevent disease.    Limit alcohol to one drink per day.    No smoking.     Wear sunscreen to prevent skin cancer.     See your dentist every six months for an exam and cleaning.    See your eye doctor every 1 to 2 years.

## 2022-05-31 ENCOUNTER — TELEPHONE (OUTPATIENT)
Dept: CARDIOLOGY | Facility: CLINIC | Age: 67
End: 2022-05-31
Payer: COMMERCIAL

## 2022-05-31 NOTE — TELEPHONE ENCOUNTER
Per Doris Fraser NP, asking to help pt with cost of xarelto. She will be starting medicare in June 2022    [facility] has  A [employer] program specific for Medicare patients. I mailed he the information, also  A card for 30 day trial, and 10$ co pay card to see if any will work      mmunns lpn      Pt is calling the care path   Number to apply

## 2022-06-02 LAB
BKR LAB AP GYN ADEQUACY: NORMAL
BKR LAB AP GYN INTERPRETATION: NORMAL
BKR LAB AP HPV REFLEX: NORMAL
BKR LAB AP PREVIOUS ABNORMAL: NORMAL
PATH REPORT.COMMENTS IMP SPEC: NORMAL
PATH REPORT.COMMENTS IMP SPEC: NORMAL
PATH REPORT.RELEVANT HX SPEC: NORMAL

## 2022-06-03 LAB
HUMAN PAPILLOMA VIRUS 16 DNA: NEGATIVE
HUMAN PAPILLOMA VIRUS 18 DNA: NEGATIVE
HUMAN PAPILLOMA VIRUS FINAL DIAGNOSIS: NORMAL
HUMAN PAPILLOMA VIRUS OTHER HR: NEGATIVE

## 2022-07-13 DIAGNOSIS — E78.00 HYPERCHOLESTEROLEMIA: ICD-10-CM

## 2022-07-13 DIAGNOSIS — I25.10 CORONARY ARTERY DISEASE INVOLVING NATIVE CORONARY ARTERY OF NATIVE HEART WITHOUT ANGINA PECTORIS: ICD-10-CM

## 2022-07-13 DIAGNOSIS — I49.5 SINUS NODE DYSFUNCTION (H): ICD-10-CM

## 2022-07-13 DIAGNOSIS — Z95.0 CARDIAC PACEMAKER IN SITU: ICD-10-CM

## 2022-07-13 RX ORDER — ROSUVASTATIN CALCIUM 20 MG/1
40 TABLET, COATED ORAL DAILY
Qty: 180 TABLET | Refills: 1 | Status: SHIPPED | OUTPATIENT
Start: 2022-07-13 | End: 2022-09-07

## 2022-07-13 NOTE — TELEPHONE ENCOUNTER
[facility] Refill Guideline reviewed.  Medication meets criteria for refill.    Received refill request for:  Rosuvastatin  Last OV was: 22  Labs/EK22  F/U scheduled: Follow up in 2022. Pt does not have f/u scheduled  New script sent to: Danita HERNANDEZ RN  22 at 2:56 PM

## 2022-07-27 ENCOUNTER — TELEPHONE (OUTPATIENT)
Dept: CARDIOLOGY | Facility: CLINIC | Age: 67
End: 2022-07-27

## 2022-07-27 NOTE — TELEPHONE ENCOUNTER
Reason for Call:  Other call back    Detailed comments: pt calling saying she got new insurance and her blood thinner medication is now over $200 and wants to know if she can just stop taking it. Please call pt back to discuss Thank you    Phone Number Patient can be reached at: Cell number on file:    Telephone Information:   Mobile [phone]       Best Time: any    Can we leave a detailed message on this number? YES    Call taken on 7/27/2022 at 4:51 PM by Chani Doyle

## 2022-07-28 NOTE — TELEPHONE ENCOUNTER
RN received information from pharmacy liaison regarding cost and options for patient. RN updated patient via [email or web address] with the options (see [email or web address] message for further details). RN will await patient's response.

## 2022-08-09 DIAGNOSIS — I10 BENIGN ESSENTIAL HYPERTENSION: Primary | ICD-10-CM

## 2022-08-11 RX ORDER — HYDROCHLOROTHIAZIDE 12.5 MG/1
TABLET ORAL
Qty: 60 TABLET | Refills: 0 | Status: SHIPPED | OUTPATIENT
Start: 2022-08-11 | End: 2022-09-07

## 2022-08-22 ENCOUNTER — TELEPHONE (OUTPATIENT)
Dept: CARDIOLOGY | Facility: CLINIC | Age: 67
End: 2022-08-22

## 2022-08-22 NOTE — TELEPHONE ENCOUNTER
Attempted to reach patient times 3 and left message to return call.  No return call since 8-22-22.  Received a note from [employer] for rosuvastatin 40mg is not covered.  Left detailed message for patient to return call for clarification of medication and dose.  Jennifer Thompson RN on 8/25/2022 at 10:51 AM

## 2022-09-03 DIAGNOSIS — I10 BENIGN ESSENTIAL HYPERTENSION: ICD-10-CM

## 2022-09-06 NOTE — PROGRESS NOTES
Cardiology Clinic Progress Note  Tatiana Skelton MRN# [id number]   YOB: 1955 Age: 67 year old     Primary cardiologist: Dr. Lomeli    Reason for visit: Follow up atrial fibrillation    History of presenting illness:    Tatiana Skelton, a pleasant 67 year old patient who has a past medical history significant for:     1. Paroxysmal atrial fibrillation: CHADS2-VASc score of 3 (age, gender, coronary artery calcifications) and family history of atrial fibrillation in father, brother and son   2. Bradycardia and syncope s/p dual chamber PPM  2002 with generator replacement in 2012.   3. Coronary artery atherosclerosis  4. Hypercholesterolemia  5.  Venous insufficiency: Reported previous vein procedures    In June 2021 she underwent a CT coronary calcium score that demonstrated a total score of 609 placing her in the 97 percentile for matched age and gender group.  She underwent a stress echocardiogram in July 2021 that was normal and LVEF was 55 to 60%.      On a pacemaker check from 2/17/2022 revealed 2 episodes of atrial fibrillation first in January 2019 and second in November 2020 lasting 11-1/2 hours and 8-1/2 hours respectively.  There were 2 episodes of nonsustained VT with heart rate between 190 and 240 occurring in December 2019 and most recently on 2/14/2022.  Of note, prior to the February device check the patient's the device had not been checked since 2018. She also has not had subsequent checks since February 2022 as she states she has difficulty with setting up her home monitor.     She was evaluated in clinic on 2/23/2022 for a discuss regarding starting anticoagulation in the setting of paroxsymal atrial fibrillation noted on device checks. Xarelto was started for CVA prophylaxis.  She recently enrolled in Medicare as of July 1 and unfortunately was unable to utilize the co-pay card.  She has been off Xarelto for approximately 2 months.     Today she returns to discuss  anticoagulation due to the fact she is unable to afford Xarelto at this time.  She states she will be able to restart a DOAC in January as she needs to read her deductible at the beginning of the year.  Up until that time we discussed starting warfarin until January 2023 and she is agreeable.  Also, Jaimie reports a history of vein procedures in the past and has significant lower extremity edema if she does not utilize hydrochlorothiazide.         Assessment and Plan:     ASSESSMENT:    1. Paroxysmal atrial fibrillation    Noted on device checks with instances correlating with COVID-19 infection in November 2020     Previously did not tolerate metoprolol due to profound fatigue and of AV  jade options are needed in the future could consider calcium channel blockers    [id number] score of 3 (age, gender and coronary artery calcifications)    2. Coronary artery calcifications    CT calcium score showed total score of 609 ( left main 101, LAD 48, left circumflex 0 and RCA 20) placing her in the 97th percentile for age and gender    FLP (5/27/2022): Total cholesterol 175, , HDL 53 and triglycerides 64    Currently on rosuvastatin 40 mg daily and baby aspirin    3. History of syncope and bradycardia    Status post permanent pacemaker placement in 2002 in [place] with generator change in 2012    PLAN:     1. Reestablish with device clinic  2. Referral to anticoagulation clinic and will transition back to DOAC in January 2023   3. Follow up in January to restart DOAC       Orders this Visit:  Orders Placed This Encounter   Procedures     Anticoagulation Clinic Referral     Follow-Up with Cardiology ROBERT     Orders Placed This Encounter   Medications     cholecalciferol 25 MCG (1000 UT) TABS     Cranberry 1000 MG CAPS     calcium carbonate (OS-SONIA) 500 MG tablet     Sig: Take 1 tablet by mouth 2 times daily     rosuvastatin (CRESTOR) 20 MG tablet     Sig: Take 2 tablets (40 mg) by mouth daily     Dispense:  180  "tablet     Refill:  1     DISCONTD: hydrochlorothiazide (HYDRODIURIL) 12.5 MG tablet     Sig: Take 1 tablet (12.5 mg) by mouth daily     Dispense:  90 tablet     Refill:  3     hydrochlorothiazide (HYDRODIURIL) 12.5 MG tablet     Si-2 tablets a day for lower extremity swelling     Dispense:  90 tablet     Refill:  3     Medications Discontinued During This Encounter   Medication Reason     rosuvastatin (CRESTOR) 20 MG tablet Reorder     hydrochlorothiazide (HYDRODIURIL) 12.5 MG tablet      hydrochlorothiazide (HYDRODIURIL) 12.5 MG tablet Reorder     rivaroxaban ANTICOAGULANT (XARELTO ANTICOAGULANT) 20 MG TABS tablet        Today's clinic visit entailed:  [facility]  Review of the result(s) of each unique test - CT angiogram, echocardiogram, stress echocardiogram, FLP, BMP, device check  Ordering of each unique test  Prescription drug management  30 minutes spent on the date of the encounter doing chart review, history and exam, documentation and further activities per the note  Provider  Link to [employer] Help Grid     The level of medical decision making during this visit was of moderate complexity.           Review of Systems:     Review of Systems:  Skin:  Positive for rash   Eyes:  Positive for glasses  ENT:  Positive for    Respiratory:  Positive for cough  Cardiovascular:  Negative edema;Positive for  Gastroenterology: Negative    Genitourinary:  Positive for urgency  Musculoskeletal:  Positive for neck pain  Neurologic:  Negative    Psychiatric:       Heme/Lymph/Imm:       Endocrine:                 Physical Exam:   Vitals: /72 (BP Location: Right arm, Patient Position: Sitting, Cuff Size: Adult Regular)   Pulse 79   Ht 1.722 m (5' 7.8\")   Wt 78.2 kg (172 lb 6.4 oz)   LMP  (LMP Unknown)   SpO2 95%   Breastfeeding No   BMI 26.37 kg/m    Constitutional:  cooperative, alert and oriented, well developed, well nourished, " in no acute distress        Skin:  warm and dry to the touch, no apparent skin lesions or masses noted        Head:  normocephalic, no masses or lesions        ENT:  no pallor or cyanosis, dentition good        Neck:  carotid pulses are full and equal bilaterally, JVP normal, no carotid bruit        Chest:  normal symmetry        Cardiac: regular rhythm                  Abdomen:  not assessed this visit        Extremities and Back:  no edema        Neurological:  no gross motor deficits             Medications:     Current Outpatient Medications   Medication Sig Dispense Refill     aspirin (ASA) 81 MG chewable tablet Take 81 mg by mouth daily       calcium carbonate (OS-SONIA) 500 MG tablet Take 1 tablet by mouth 2 times daily       cholecalciferol 25 MCG (1000 UT) TABS        Cranberry 1000 MG CAPS        hydrochlorothiazide (HYDRODIURIL) 12.5 MG tablet 1-2 tablets a day for lower extremity swelling 90 tablet 3     mometasone (ELOCON) 0.1 % external ointment Apply sparingly to affected area twice daily as needed.  Do not apply to face. 45 g 1     rosuvastatin (CRESTOR) 20 MG tablet Take 2 tablets (40 mg) by mouth daily 180 tablet 1       Family History   Problem Relation Age of Onset     Respiratory Mother 61        COPD     Heart Disease Mother      Eye Disorder Mother         cataract surgery     Lipids Mother      Diabetes Mother      Hypertension Mother      Eye Disorder Father      Glaucoma Father 80        takes drops, frequent apt - suspect it is glaucoma     Hypertension Sister      Cancer Sister      Uterine Cancer Sister      Lipids Sister      Lipids Brother      Hypertension Brother      Cerebrovascular Disease No family hx of      Thyroid Disease No family hx of      Macular Degeneration No family hx of        Social History     Socioeconomic History     Marital status: Single     Spouse name: Not on file     Number of children: Not on file     Years of education: Not on file     Highest education level:  Not on file   Occupational History     Not on file   Tobacco Use     Smoking status: Never Smoker     Smokeless tobacco: Never Used     Tobacco comment: Lives in smoke free household   Vaping Use     Vaping Use: Never used   Substance and Sexual Activity     Alcohol use: Yes     Comment: twice a month     Drug use: No     Sexual activity: Yes     Partners: Male     Birth control/protection: Surgical, Female Surgical     Comment: tubal ligation   Other Topics Concern     Parent/sibling w/ CABG, MI or angioplasty before 65F 55M? Not Asked   Social History Narrative     Not on file     Social Determinants of Health     Financial Resource Strain: Not on file   Food Insecurity: Not on file   Transportation Needs: Not on file   Physical Activity: Not on file   Stress: Not on file   Social Connections: Not on file   Intimate Partner Violence: Not on file   Housing Stability: Not on file            Past Medical History:     Past Medical History:   Diagnosis Date     Cervical high risk HPV (human papillomavirus) test positive 10/2/14, 3/6/16     Elevated cholesterol      Generalized osteoarthrosis, unspecified site 1/30/2015     Open angle with borderline findings, low risk 1/9/2014     Sick sinus syndrome (H)     PM implant since 2002     Syncope               Past Surgical History:     Past Surgical History:   Procedure Laterality Date     ABDOMEN SURGERY      Tubal     BUNIONECTOMY JACKIE BILATERAL       COLONOSCOPY N/A 8/20/2018    Procedure: COLONOSCOPY;  COLONOSCOPY;  Surgeon: Manas Lee DO;  Location:  GI      OR IMPLANT BREAST       IMPLANT IMPLANTABLE CARDIOVERTER DEFIBRILLATOR  6/4/2012    she does not have an ICD. She has a pacemker.     IMPLANT PACEMAKER  2002    Dual chamber medtronic for sick sinus snyndrome     TUBAL LIGATION  2000              Allergies:   Versed and Morphine sulfate       Data:   All laboratory data reviewed:    Recent Labs   Lab Test 05/27/22  1200 02/24/22  0830  12/24/18  0814 12/21/17  0811 12/09/16  0806 04/07/16  0810 08/25/14  1056 08/25/14  1056 08/25/14  1055   LDL 53 44 105*   < > 106* 129*   < > 92  --     101 94   < > 113 101   < > 98  --    NHDL 66 55 120   < > 115 141*   < >  --   --    CHOL 175 156 214*   < > 228* 242*   < > 201*  --    TRIG 64 55 74   < > 47 58   < > 53  --    TSH  --   --   --   --  3.57 2.90  --  1.74  --    NTBNP  --   --   --   --   --   --   --   --  168*    < > = values in this interval not displayed.       Lab Results   Component Value Date    WBC 4.3 01/30/2015    RBC 4.62 01/30/2015    HGB 14.3 01/30/2015    HCT 40.4 01/30/2015    MCV 87 01/30/2015    MCH 31.0 01/30/2015    MCHC 35.4 01/30/2015    RDW 13.6 01/30/2015     01/30/2015       Lab Results   Component Value Date     04/07/2016    POTASSIUM 3.8 04/07/2016    CHLORIDE 106 04/07/2016    CO2 29 04/07/2016    ANIONGAP 9 04/07/2016    GLC 88 12/24/2018    BUN 19 04/07/2016    CR 0.78 04/07/2016    GFRESTIMATED 75 04/07/2016    GFRESTBLACK >90   GFR Calc   04/07/2016    SONIA 9.7 04/07/2016      Lab Results   Component Value Date    AST 15 01/30/2015    ALT 27 05/27/2022    ALT 17 01/30/2015       No results found for: A1C    No results found for: INR      HERMILA VILLEGAS [facility]  Pager: [phone]  RN phone: [phone]

## 2022-09-07 ENCOUNTER — ANTICOAGULATION THERAPY VISIT (OUTPATIENT)
Dept: ANTICOAGULATION | Facility: CLINIC | Age: 67
End: 2022-09-07

## 2022-09-07 ENCOUNTER — TELEPHONE (OUTPATIENT)
Dept: CARDIOLOGY | Facility: CLINIC | Age: 67
End: 2022-09-07

## 2022-09-07 ENCOUNTER — OFFICE VISIT (OUTPATIENT)
Dept: CARDIOLOGY | Facility: CLINIC | Age: 67
End: 2022-09-07
Attending: NURSE PRACTITIONER
Payer: COMMERCIAL

## 2022-09-07 VITALS
DIASTOLIC BLOOD PRESSURE: 72 MMHG | BODY MASS INDEX: 26.13 KG/M2 | HEIGHT: 68 IN | OXYGEN SATURATION: 95 % | HEART RATE: 79 BPM | SYSTOLIC BLOOD PRESSURE: 116 MMHG | WEIGHT: 172.4 LBS

## 2022-09-07 DIAGNOSIS — I10 BENIGN ESSENTIAL HYPERTENSION: ICD-10-CM

## 2022-09-07 DIAGNOSIS — I49.5 SINUS NODE DYSFUNCTION (H): ICD-10-CM

## 2022-09-07 DIAGNOSIS — Z95.0 CARDIAC PACEMAKER IN SITU: ICD-10-CM

## 2022-09-07 DIAGNOSIS — I48.0 PAROXYSMAL ATRIAL FIBRILLATION (H): Primary | ICD-10-CM

## 2022-09-07 DIAGNOSIS — I25.10 CORONARY ARTERY DISEASE INVOLVING NATIVE CORONARY ARTERY OF NATIVE HEART WITHOUT ANGINA PECTORIS: ICD-10-CM

## 2022-09-07 DIAGNOSIS — E78.00 HYPERCHOLESTEROLEMIA: ICD-10-CM

## 2022-09-07 DIAGNOSIS — I48.0 PAROXYSMAL ATRIAL FIBRILLATION (H): ICD-10-CM

## 2022-09-07 PROCEDURE — 99214 OFFICE O/P EST MOD 30 MIN: CPT | Performed by: NURSE PRACTITIONER

## 2022-09-07 RX ORDER — HYDROCHLOROTHIAZIDE 12.5 MG/1
12.5 TABLET ORAL DAILY
Qty: 90 TABLET | Refills: 3 | Status: SHIPPED | OUTPATIENT
Start: 2022-09-07 | End: 2022-09-07

## 2022-09-07 RX ORDER — ROSUVASTATIN CALCIUM 20 MG/1
40 TABLET, COATED ORAL DAILY
Qty: 180 TABLET | Refills: 1 | Status: SHIPPED | OUTPATIENT
Start: 2022-09-07 | End: 2022-09-20

## 2022-09-07 RX ORDER — HYDROCHLOROTHIAZIDE 12.5 MG/1
TABLET ORAL
Qty: 60 TABLET | Refills: 0 | OUTPATIENT
Start: 2022-09-07

## 2022-09-07 RX ORDER — HYDROCHLOROTHIAZIDE 12.5 MG/1
TABLET ORAL
Qty: 90 TABLET | Refills: 3 | Status: SHIPPED | OUTPATIENT
Start: 2022-09-07 | End: 2022-12-20

## 2022-09-07 RX ORDER — CALCIUM CARBONATE 500(1250)
1 TABLET ORAL 2 TIMES DAILY
COMMUNITY

## 2022-09-07 RX ORDER — WARFARIN SODIUM 5 MG/1
TABLET ORAL
Qty: 30 TABLET | Refills: 0 | Status: SHIPPED | OUTPATIENT
Start: 2022-09-07 | End: 2022-10-10

## 2022-09-07 ASSESSMENT — PAIN SCALES - GENERAL: PAINLEVEL: NO PAIN (0)

## 2022-09-07 NOTE — TELEPHONE ENCOUNTER
Sent 9/7/22, with 12 month supply. Should not need refills at this time    Rose Reyes RN on 9/7/2022 at 11:46 AM

## 2022-09-07 NOTE — LETTER
9/7/2022    Nicole Gray, DANIE  [address] [place] [address]    RE: Tatiana Skelton       Dear Colleague,     I had the pleasure of seeing Tatiana Skelton in the [facility].    Cardiology Clinic Progress Note  Tatiana Skelton MRN# [id number]   YOB: 1955 Age: 67 year old     Primary cardiologist: Dr. Lomeli    Reason for visit: Follow up atrial fibrillation    History of presenting illness:    Tatiana Skelton, a pleasant 67 year old patient who has a past medical history significant for:     1. Paroxysmal atrial fibrillation: CHADS2-VASc score of 3 (age, gender, coronary artery calcifications) and family history of atrial fibrillation in father, brother and son   2. Bradycardia and syncope s/p dual chamber PPM  2002 with generator replacement in 2012.   3. Coronary artery atherosclerosis  4. Hypercholesterolemia  5.  Venous insufficiency: Reported previous vein procedures    In June 2021 she underwent a CT coronary calcium score that demonstrated a total score of 609 placing her in the 97 percentile for matched age and gender group.  She underwent a stress echocardiogram in July 2021 that was normal and LVEF was 55 to 60%.      On a pacemaker check from 2/17/2022 revealed 2 episodes of atrial fibrillation first in January 2019 and second in November 2020 lasting 11-1/2 hours and 8-1/2 hours respectively.  There were 2 episodes of nonsustained VT with heart rate between 190 and 240 occurring in December 2019 and most recently on 2/14/2022.  Of note, prior to the February device check the patient's the device had not been checked since 2018. She also has not had subsequent checks since February 2022 as she states she has difficulty with setting up her home monitor.     She was evaluated in clinic on 2/23/2022 for a discuss regarding starting anticoagulation in the setting of paroxsymal atrial fibrillation noted on device checks. Xarelto was started for CVA  prophylaxis.  She recently enrolled in Medicare as of July 1 and unfortunately was unable to utilize the co-pay card.  She has been off Xarelto for approximately 2 months.     Today she returns to discuss anticoagulation due to the fact she is unable to afford Xarelto at this time.  She states she will be able to restart a DOAC in January as she needs to read her deductible at the beginning of the year.  Up until that time we discussed starting warfarin until January 2023 and she is agreeable.  Also, Jaimie reports a history of vein procedures in the past and has significant lower extremity edema if she does not utilize hydrochlorothiazide.         Assessment and Plan:     ASSESSMENT:    1. Paroxysmal atrial fibrillation    Noted on device checks with instances correlating with COVID-19 infection in November 2020     Previously did not tolerate metoprolol due to profound fatigue and of AV  jade options are needed in the future could consider calcium channel blockers    [id number] score of 3 (age, gender and coronary artery calcifications)    2. Coronary artery calcifications    CT calcium score showed total score of 609 ( left main 101, LAD 48, left circumflex 0 and RCA 20) placing her in the 97th percentile for age and gender    FLP (5/27/2022): Total cholesterol 175, , HDL 53 and triglycerides 64    Currently on rosuvastatin 40 mg daily and baby aspirin    3. History of syncope and bradycardia    Status post permanent pacemaker placement in 2002 in [place] with generator change in 2012    PLAN:     1. Reestablish with device clinic  2. Referral to anticoagulation clinic and will transition back to DOAC in January 2023   3. Follow up in January to restart DOAC       Orders this Visit:  Orders Placed This Encounter   Procedures     Anticoagulation Clinic Referral     Follow-Up with Cardiology ROBERT     Orders Placed This Encounter   Medications     cholecalciferol 25 MCG (1000 UT) TABS     Cranberry 1000 MG  CAPS     calcium carbonate (OS-SONIA) 500 MG tablet     Sig: Take 1 tablet by mouth 2 times daily     rosuvastatin (CRESTOR) 20 MG tablet     Sig: Take 2 tablets (40 mg) by mouth daily     Dispense:  180 tablet     Refill:  1     DISCONTD: hydrochlorothiazide (HYDRODIURIL) 12.5 MG tablet     Sig: Take 1 tablet (12.5 mg) by mouth daily     Dispense:  90 tablet     Refill:  3     hydrochlorothiazide (HYDRODIURIL) 12.5 MG tablet     Si-2 tablets a day for lower extremity swelling     Dispense:  90 tablet     Refill:  3     Medications Discontinued During This Encounter   Medication Reason     rosuvastatin (CRESTOR) 20 MG tablet Reorder     hydrochlorothiazide (HYDRODIURIL) 12.5 MG tablet      hydrochlorothiazide (HYDRODIURIL) 12.5 MG tablet Reorder     rivaroxaban ANTICOAGULANT (XARELTO ANTICOAGULANT) 20 MG TABS tablet        Today's clinic visit entailed:  Review of prior external [facility]) [facility]  Review of the result(s) of each unique test - CT angiogram, echocardiogram, stress echocardiogram, FLP, BMP, device check  Ordering of each unique test  Prescription drug management  30 minutes spent on the date of the encounter doing chart review, history and exam, documentation and further activities per the note  Provider  Link to [facility] Help Grid     The level of medical decision making during this visit was of moderate complexity.           Review of Systems:     Review of Systems:  Skin:  Positive for rash   Eyes:  Positive for glasses  ENT:  Positive for    Respiratory:  Positive for cough  Cardiovascular:  Negative edema;Positive for  Gastroenterology: Negative    Genitourinary:  Positive for urgency  Musculoskeletal:  Positive for neck pain  Neurologic:  Negative    Psychiatric:       Heme/Lymph/Imm:       Endocrine:                 Physical Exam:   Vitals: /72 (BP Location: Right arm, Patient Position: Sitting, Cuff Size: Adult Regular)   Pulse 79   Ht  "1.722 m (5' 7.8\")   Wt 78.2 kg (172 lb 6.4 oz)   LMP  (LMP Unknown)   SpO2 95%   Breastfeeding No   BMI 26.37 kg/m    Constitutional:  cooperative, alert and oriented, well developed, well nourished, in no acute distress        Skin:  warm and dry to the touch, no apparent skin lesions or masses noted        Head:  normocephalic, no masses or lesions        ENT:  no pallor or cyanosis, dentition good        Neck:  carotid pulses are full and equal bilaterally, JVP normal, no carotid bruit        Chest:  normal symmetry        Cardiac: regular rhythm                  Abdomen:  not assessed this visit        Extremities and Back:  no edema        Neurological:  no gross motor deficits             Medications:     Current Outpatient Medications   Medication Sig Dispense Refill     aspirin (ASA) 81 MG chewable tablet Take 81 mg by mouth daily       calcium carbonate (OS-SONIA) 500 MG tablet Take 1 tablet by mouth 2 times daily       cholecalciferol 25 MCG (1000 UT) TABS        Cranberry 1000 MG CAPS        hydrochlorothiazide (HYDRODIURIL) 12.5 MG tablet 1-2 tablets a day for lower extremity swelling 90 tablet 3     mometasone (ELOCON) 0.1 % external ointment Apply sparingly to affected area twice daily as needed.  Do not apply to face. 45 g 1     rosuvastatin (CRESTOR) 20 MG tablet Take 2 tablets (40 mg) by mouth daily 180 tablet 1       Family History   Problem Relation Age of Onset     Respiratory Mother 61        COPD     Heart Disease Mother      Eye Disorder Mother         cataract surgery     Lipids Mother      Diabetes Mother      Hypertension Mother      Eye Disorder Father      Glaucoma Father 80        takes drops, frequent apt - suspect it is glaucoma     Hypertension Sister      Cancer Sister      Uterine Cancer Sister      Lipids Sister      Lipids Brother      Hypertension Brother      Cerebrovascular Disease No family hx of      Thyroid Disease No family hx of      Macular Degeneration No family hx of "        Social History     Socioeconomic History     Marital status: Single     Spouse name: Not on file     Number of children: Not on file     Years of education: Not on file     Highest education level: Not on file   Occupational History     Not on file   Tobacco Use     Smoking status: Never Smoker     Smokeless tobacco: Never Used     Tobacco comment: Lives in smoke free household   Vaping Use     Vaping Use: Never used   Substance and Sexual Activity     Alcohol use: Yes     Comment: twice a month     Drug use: No     Sexual activity: Yes     Partners: Male     Birth control/protection: Surgical, Female Surgical     Comment: tubal ligation   Other Topics Concern     Parent/sibling w/ CABG, MI or angioplasty before 65F 55M? Not Asked   Social History Narrative     Not on file     Social Determinants of Health     Financial Resource Strain: Not on file   Food Insecurity: Not on file   Transportation Needs: Not on file   Physical Activity: Not on file   Stress: Not on file   Social Connections: Not on file   Intimate Partner Violence: Not on file   Housing Stability: Not on file            Past Medical History:     Past Medical History:   Diagnosis Date     Cervical high risk HPV (human papillomavirus) test positive 10/2/14, 3/6/16     Elevated cholesterol      Generalized osteoarthrosis, unspecified site 1/30/2015     Open angle with borderline findings, low risk 1/9/2014     Sick sinus syndrome (H)     PM implant since 2002     Syncope               Past Surgical History:     Past Surgical History:   Procedure Laterality Date     ABDOMEN SURGERY      Tubal     BUNIONECTOMY JACKIE BILATERAL       COLONOSCOPY N/A 8/20/2018    Procedure: COLONOSCOPY;  COLONOSCOPY;  Surgeon: Manas Lee DO;  Location: [facility] OR IMPLANT BREAST       IMPLANT IMPLANTABLE CARDIOVERTER DEFIBRILLATOR  6/4/2012    she does not have an ICD. She has a pacemker.     IMPLANT PACEMAKER  2002    Dual chamber medtronic for  sick sinus snyndrome     TUBAL LIGATION  2000              Allergies:   Versed and Morphine sulfate       Data:   All laboratory data reviewed:    Recent Labs   Lab Test 05/27/22  1200 02/24/22  0830 12/24/18  0814 12/21/17  0811 12/09/16  0806 04/07/16  0810 08/25/14  1056 08/25/14  1056 08/25/14  1055   LDL 53 44 105*   < > 106* 129*   < > 92  --     101 94   < > 113 101   < > 98  --    NHDL 66 55 120   < > 115 141*   < >  --   --    CHOL 175 156 214*   < > 228* 242*   < > 201*  --    TRIG 64 55 74   < > 47 58   < > 53  --    TSH  --   --   --   --  3.57 2.90  --  1.74  --    NTBNP  --   --   --   --   --   --   --   --  168*    < > = values in this interval not displayed.       Lab Results   Component Value Date    WBC 4.3 01/30/2015    RBC 4.62 01/30/2015    HGB 14.3 01/30/2015    HCT 40.4 01/30/2015    MCV 87 01/30/2015    MCH 31.0 01/30/2015    MCHC 35.4 01/30/2015    RDW 13.6 01/30/2015     01/30/2015       Lab Results   Component Value Date     04/07/2016    POTASSIUM 3.8 04/07/2016    CHLORIDE 106 04/07/2016    CO2 29 04/07/2016    ANIONGAP 9 04/07/2016    GLC 88 12/24/2018    BUN 19 04/07/2016    CR 0.78 04/07/2016    GFRESTIMATED 75 04/07/2016    GFRESTBLACK >90   GFR Calc   04/07/2016    SONIA 9.7 04/07/2016      Lab Results   Component Value Date    AST 15 01/30/2015    ALT 27 05/27/2022    ALT 17 01/30/2015       No results found for: A1C    No results found for: INR      HERMILA VILLEGAS CNP  [facility]  Pager: [phone]  RN phone: [phone]    Thank you for allowing me to participate in the care of your patient.      Sincerely,     HERMILA VILLEGAS CNP     [facility]  cc:   HERMILA Lomas CNP  [address]  [place] [id number]

## 2022-09-07 NOTE — TELEPHONE ENCOUNTER
Tracked pt down at work to let her know that she has a remote transmission set up for Friday per Doris's request. Pt angry she was called at work because we were supposed to call her ON Friday to set up her home monitor. Apologized as this was not communicated as such and when calling her phone, it's disconnected. Pt says her phone works just fine, however just tried to call pt back on it and it still says disconnected. Kept remote appt and will attempt to call pt again on Friday. KELLY Gonzalez

## 2022-09-07 NOTE — TELEPHONE ENCOUNTER
----- Message from HERMILA Lomas CNP sent at 9/7/2022 11:08 AM CDT -----  Thanks for trying    She works at the [employer] [phone] or try her significant other?     ----- Message -----  From: Manuela Odonnell RN  Sent: 9/7/2022  10:01 AM CDT  To: HERMILA Lomas CNP    I set up a remote transmission for this Friday. Attempted to call pt. Phone has been disconnected. Tried the other number on the demographics but no answer. She was to check if she had a handheld unit to use the carelink troy on her phone and never called back. She was sent a monitor at one time, confirmed she had it at an in clinic appt. She was to call for assistance setting it up and never called back. Did she leave another number to reach her? At a loss. KELLY Roy  ----- Message -----  From: Doris Fraser APRN CNP  Sent: 9/7/2022   8:38 AM CDT  To: Filomena [facility] Heart Device Nurse    Please call patient to get set up with home device checks. Friday's work the best as she works for home.     HERMILA Armstrong, CH[place]

## 2022-09-07 NOTE — PATIENT INSTRUCTIONS
TODAY'S RECOMMENDATIONS:    Start Coumadin (Warfarin) from now until end of 2022, then will transition to Xarelto.  Establish care with device the clinic  Continue all other medications without changes.  Please follow up with Doris in January.    If you have questions or concerns please call clinic at [phone].    Please call [phone] for scheduling.      It was a pleasure seeing you today!

## 2022-09-07 NOTE — PROGRESS NOTES
Left message informing patient that warfarin was sent to the pharmacy for her and she should start that- 5 mg (1 tablet) daily and call back to review warfarin education and set up INR appt.     ANA Martell, RN- Coumadin Clinic Doris Marshall APRN CNP Walker, Rachel, RN  Yes, Please     Thanks            Previous Messages       ----- Message -----   From: Jacque Koehler RN   Sent: 9/7/2022  11:20 AM CDT   To: HERMILA Lomas CNP   Subject: warfarin dosing                                   Ok for warfarin 5 mg starting dose? Thank you!     ANA Martell, RN- Coumadin Clinic RN

## 2022-09-12 ENCOUNTER — ANCILLARY PROCEDURE (OUTPATIENT)
Dept: CARDIOLOGY | Facility: CLINIC | Age: 67
End: 2022-09-12
Attending: INTERNAL MEDICINE
Payer: COMMERCIAL

## 2022-09-12 DIAGNOSIS — Z95.0 CARDIAC PACEMAKER IN SITU: ICD-10-CM

## 2022-09-12 DIAGNOSIS — I49.5 SINUS NODE DYSFUNCTION (H): ICD-10-CM

## 2022-09-12 PROCEDURE — 93296 REM INTERROG EVL PM/IDS: CPT | Performed by: INTERNAL MEDICINE

## 2022-09-12 PROCEDURE — 93294 REM INTERROG EVL PM/LDLS PM: CPT | Performed by: INTERNAL MEDICINE

## 2022-09-12 NOTE — PROGRESS NOTES
ANTICOAGULATION MANAGEMENT     Tatiana Skelton 67 year old female warfarin ordered 9/7, but did not reach pt until Mon 9/12. (Goal INR 2.0-3.0)    No results for input(s): INR in the last 168 hours.    ASSESSMENT       Source(s): Chart Review and Patient/Caregiver Call       Warfarin doses taken: Warfarin taken as instructed and Picking up warfarin tomorrow 9/13    Diet: na    New illness, injury, or hospitalization: No    Medication/supplement changes: on Doac previously    Signs or symptoms of bleeding or clotting: No    Previous INR: na    Additional findings: None       PLAN     Recommended plan for no diet, medication or health factor changes affecting INR     Dosing Instructions: Start warfarin wed9/14 (pt out of town until Sunday 9/18, with next INR in 5 days from starting.       Summary  As of 9/7/2022    Full warfarin instructions:  9/7: Hold; 9/8: Hold; 9/9: Hold; 9/10: Hold; 9/11: Hold; 9/12: Hold; 9/13: Hold; Otherwise 2.5 mg every Mon, Wed, Fri; 5 mg all other days   Next INR check:  9/19/2022             Telephone call with Jaimie who verbalizes understanding and agrees to plan, who agrees to plan and repeated back plan correctly and warfarin education reviewed.     Lab visit scheduled    Education provided: Importance of consistent vitamin K intake, Importance of therapeutic range, Importance of following up at instructed interval, Importance of taking warfarin as instructed, Monitoring for bleeding signs and symptoms, Monitoring for clotting signs and symptoms, When to seek medical attention/emergency care, Travel related clotting risk and prevention, Importance of notifying clinic for changes in medications; a sooner lab recheck maybe needed., Importance of notifying clinic for diarrhea, nausea/vomiting, reduced intake, and/or illness; a sooner lab recheck maybe needed., Importance of notifying clinic of upcoming surgeries and procedures 2 weeks in advance and Contact [phone]  with any  changes, questions or concerns.     Plan made with [facility] Pharmacist Berenice Koehler, RN  Anticoagulation Clinic  9/12/2022    _______________________________________________________________________     Anticoagulation Episode Summary     Current INR goal:  2.0-3.0   TTR:  --   Target end date:  1/1/2023   Send INR reminders to:  [facility]    Indications    Paroxysmal atrial fibrillation (H) [I48.0]           Comments:           Anticoagulation Care Providers     Provider Role Specialty Phone number    Doris Fraser, APRN CNP Referring Cardiovascular Disease [phone]

## 2022-09-13 ENCOUNTER — TELEPHONE (OUTPATIENT)
Dept: CARDIOLOGY | Facility: CLINIC | Age: 67
End: 2022-09-13

## 2022-09-13 NOTE — TELEPHONE ENCOUNTER
"Reason for Call:  Other call back    Detailed comments: patient calling stating she was told there were 36 \"incidents\" in her remote pace maker readings and she wanted to know if Doris Fraser could look it over and call her back and explain it. Thank you    Phone Number Patient can be reached at: Home number on file [phone] (home)    Best Time: any    Can we leave a detailed message on this number? YES    Call taken on 9/13/2022 at 12:38 PM by Chani Doyle      "

## 2022-09-13 NOTE — TELEPHONE ENCOUNTER
Spoke with patient to further explained the 36 mode switches and Doris Fraser NP message. Patient was very appreciative of the further explanation. Patient said she has not started warfarin yet as she has not had a time to be able to get in to get an INR check as INR nurses like to have patient start warfarin about 2-3 days prior to their first INR check. Patient is set up to have her first INR checked on Monday 9/19/22 so she will start her warfarin a couple days before then. Patient is set to have another remote device check on 12/19/22 and follow up with Doris Fraser NP on 12/20/22. Patient requested her device remote check report be emailed to her for review. Report scanned to her email.

## 2022-09-13 NOTE — TELEPHONE ENCOUNTER
"Patient had a remote device check on 9/12/22 and is wanting to further discuss the report with Doris Fraser NP. Patient is concerned that the report states \"36 mode switch episodes logged\". Will route to Doris Fraser NP       Narrative & Impression    [employer] Joana (D) Remote PPM Device Check  AP: 3%  : <1%  Mode: AAIR<->DDDR 50/130  Presenting Rhythm: AS/VS rates 81-96bpm  Heart Rate: adequate rates per histograms   Sensing: stable   Pacing Threshold: stable   Impedance: stable   Battery Status: 7 years remaining (5-9.5 years)  Atrial Arrhythmia: 36 mode switch episodes logged comprising <1% of the time. No EGMs for review. Taking warfarin.  Ventricular Arrhythmia: 3 ventricular high rates logged. 3 EGMs for review show As=Vs for SVT lasting 3-4 seconds, rates 180s.     Care Plan: Remote carelink PPM f/u q 3 months. AMY w/ HERMILA Tom CNP scheduled 12/20/22. Results and next appointment given to patient over the phone. CONSTANTINE Mccann       "

## 2022-09-15 LAB
MDC_IDC_LEAD_IMPLANT_DT: NORMAL
MDC_IDC_LEAD_IMPLANT_DT: NORMAL
MDC_IDC_LEAD_LOCATION: NORMAL
MDC_IDC_LEAD_LOCATION: NORMAL
MDC_IDC_LEAD_LOCATION_DETAIL_1: NORMAL
MDC_IDC_LEAD_LOCATION_DETAIL_1: NORMAL
MDC_IDC_LEAD_MFG: NORMAL
MDC_IDC_LEAD_MFG: NORMAL
MDC_IDC_LEAD_MODEL: NORMAL
MDC_IDC_LEAD_MODEL: NORMAL
MDC_IDC_LEAD_POLARITY_TYPE: NORMAL
MDC_IDC_LEAD_POLARITY_TYPE: NORMAL
MDC_IDC_LEAD_SERIAL: NORMAL
MDC_IDC_LEAD_SERIAL: NORMAL
MDC_IDC_LEAD_SPECIAL_FUNCTION: NORMAL
MDC_IDC_MSMT_BATTERY_DTM: NORMAL
MDC_IDC_MSMT_BATTERY_IMPEDANCE: 811 OHM
MDC_IDC_MSMT_BATTERY_REMAINING_LONGEVITY: 88 MO
MDC_IDC_MSMT_BATTERY_STATUS: NORMAL
MDC_IDC_MSMT_BATTERY_VOLTAGE: 2.77 V
MDC_IDC_MSMT_LEADCHNL_RA_IMPEDANCE_VALUE: 484 OHM
MDC_IDC_MSMT_LEADCHNL_RA_PACING_THRESHOLD_AMPLITUDE: 0.62 V
MDC_IDC_MSMT_LEADCHNL_RA_PACING_THRESHOLD_PULSEWIDTH: 0.4 MS
MDC_IDC_MSMT_LEADCHNL_RV_IMPEDANCE_VALUE: 669 OHM
MDC_IDC_MSMT_LEADCHNL_RV_PACING_THRESHOLD_AMPLITUDE: 1.12 V
MDC_IDC_MSMT_LEADCHNL_RV_PACING_THRESHOLD_PULSEWIDTH: 0.4 MS
MDC_IDC_PG_IMPLANT_DTM: NORMAL
MDC_IDC_PG_MFG: NORMAL
MDC_IDC_PG_MODEL: NORMAL
MDC_IDC_PG_SERIAL: NORMAL
MDC_IDC_PG_TYPE: NORMAL
MDC_IDC_SESS_CLINIC_NAME: NORMAL
MDC_IDC_SESS_DTM: NORMAL
MDC_IDC_SESS_TYPE: NORMAL
MDC_IDC_SET_BRADY_AT_MODE_SWITCH_MODE: NORMAL
MDC_IDC_SET_BRADY_AT_MODE_SWITCH_RATE: 175 {BEATS}/MIN
MDC_IDC_SET_BRADY_LOWRATE: 50 {BEATS}/MIN
MDC_IDC_SET_BRADY_MAX_SENSOR_RATE: 130 {BEATS}/MIN
MDC_IDC_SET_BRADY_MAX_TRACKING_RATE: 130 {BEATS}/MIN
MDC_IDC_SET_BRADY_MODE: NORMAL
MDC_IDC_SET_BRADY_PAV_DELAY_LOW: 150 MS
MDC_IDC_SET_BRADY_SAV_DELAY_LOW: 120 MS
MDC_IDC_SET_LEADCHNL_RA_PACING_AMPLITUDE: 1.5 V
MDC_IDC_SET_LEADCHNL_RA_PACING_CAPTURE_MODE: NORMAL
MDC_IDC_SET_LEADCHNL_RA_PACING_POLARITY: NORMAL
MDC_IDC_SET_LEADCHNL_RA_PACING_PULSEWIDTH: 0.4 MS
MDC_IDC_SET_LEADCHNL_RA_SENSING_POLARITY: NORMAL
MDC_IDC_SET_LEADCHNL_RA_SENSING_SENSITIVITY: 0.5 MV
MDC_IDC_SET_LEADCHNL_RV_PACING_AMPLITUDE: 2.25 V
MDC_IDC_SET_LEADCHNL_RV_PACING_CAPTURE_MODE: NORMAL
MDC_IDC_SET_LEADCHNL_RV_PACING_POLARITY: NORMAL
MDC_IDC_SET_LEADCHNL_RV_PACING_PULSEWIDTH: 0.46 MS
MDC_IDC_SET_LEADCHNL_RV_SENSING_POLARITY: NORMAL
MDC_IDC_SET_LEADCHNL_RV_SENSING_SENSITIVITY: 4 MV
MDC_IDC_SET_ZONE_DETECTION_INTERVAL: 333.33 MS
MDC_IDC_SET_ZONE_DETECTION_INTERVAL: 342.86 MS
MDC_IDC_SET_ZONE_TYPE: NORMAL
MDC_IDC_SET_ZONE_TYPE: NORMAL
MDC_IDC_STAT_AT_BURDEN_PERCENT: 0 %
MDC_IDC_STAT_AT_DTM_END: NORMAL
MDC_IDC_STAT_AT_DTM_START: NORMAL
MDC_IDC_STAT_AT_MODE_SW_COUNT: 36
MDC_IDC_STAT_BRADY_AP_VP_PERCENT: 0 %
MDC_IDC_STAT_BRADY_AP_VS_PERCENT: 3 %
MDC_IDC_STAT_BRADY_AS_VP_PERCENT: 0 %
MDC_IDC_STAT_BRADY_AS_VS_PERCENT: 97 %
MDC_IDC_STAT_BRADY_DTM_END: NORMAL
MDC_IDC_STAT_BRADY_DTM_START: NORMAL
MDC_IDC_STAT_EPISODE_RECENT_COUNT: 0
MDC_IDC_STAT_EPISODE_RECENT_COUNT: 3
MDC_IDC_STAT_EPISODE_RECENT_COUNT_DTM_END: NORMAL
MDC_IDC_STAT_EPISODE_RECENT_COUNT_DTM_END: NORMAL
MDC_IDC_STAT_EPISODE_RECENT_COUNT_DTM_START: NORMAL
MDC_IDC_STAT_EPISODE_RECENT_COUNT_DTM_START: NORMAL
MDC_IDC_STAT_EPISODE_TYPE: NORMAL
MDC_IDC_STAT_EPISODE_TYPE: NORMAL

## 2022-09-19 ENCOUNTER — LAB (OUTPATIENT)
Dept: LAB | Facility: CLINIC | Age: 67
End: 2022-09-19
Payer: COMMERCIAL

## 2022-09-19 ENCOUNTER — ANTICOAGULATION THERAPY VISIT (OUTPATIENT)
Dept: ANTICOAGULATION | Facility: CLINIC | Age: 67
End: 2022-09-19

## 2022-09-19 DIAGNOSIS — I48.0 PAROXYSMAL ATRIAL FIBRILLATION (H): Primary | ICD-10-CM

## 2022-09-19 DIAGNOSIS — I48.0 PAROXYSMAL ATRIAL FIBRILLATION (H): ICD-10-CM

## 2022-09-19 LAB — INR BLD: 1.1 (ref 0.9–1.1)

## 2022-09-19 PROCEDURE — 85610 PROTHROMBIN TIME: CPT

## 2022-09-19 PROCEDURE — 36416 COLLJ CAPILLARY BLOOD SPEC: CPT

## 2022-09-19 NOTE — PROGRESS NOTES
ANTICOAGULATION MANAGEMENT     Tatiana Skelton 67 year old female is on warfarin with subtherapeutic INR result. (Goal INR 2.0-3.0)    Recent labs: (last 7 days)     09/19/22  0832   INR 1.1       ASSESSMENT       Source(s): Chart Review and Patient/Caregiver Call       Warfarin doses taken: Warfarin taken differently, but did not change total weekly dose    Diet: No new diet changes identified    New illness, injury, or hospitalization: No    Medication/supplement changes: None noted    Signs or symptoms of bleeding or clotting: No    Previous INR: Subtherapeutic    Additional findings: New start on day 6 of warfarin       PLAN     Recommended plan for no diet, medication or health factor changes affecting INR     Dosing Instructions: Increase your warfarin dose (27.3% change) with next INR in 3 days       Summary  As of 9/19/2022    Full warfarin instructions:  5 mg every day   Next INR check:  9/22/2022             Telephone call with Jaimie who verbalizes understanding and agrees to plan and who agrees to plan and repeated back plan correctly    Lab visit scheduled    Education provided: Please call back if any changes to your diet, medications or how you've been taking warfarin, Goal range and significance of current result, Importance of therapeutic range, Importance of following up at instructed interval and Contact [phone]  with any changes, questions or concerns.     Plan made per [facility] anticoagulation protocol    Lois Carpio RN  Anticoagulation Clinic  9/19/2022    _______________________________________________________________________     Anticoagulation Episode Summary     Current INR goal:  2.0-3.0   TTR:  --   Target end date:  1/1/2023   Send INR reminders to:  [facility]    Indications    Paroxysmal atrial fibrillation (H) [I48.0]           Comments:           Anticoagulation Care Providers     Provider Role Specialty Phone number    Doris Fraser, APRN CNP Referring  Cardiovascular Disease [phone]

## 2022-09-20 ENCOUNTER — TELEPHONE (OUTPATIENT)
Dept: FAMILY MEDICINE | Facility: CLINIC | Age: 67
End: 2022-09-20

## 2022-09-20 ENCOUNTER — TELEPHONE (OUTPATIENT)
Dept: CARDIOLOGY | Facility: CLINIC | Age: 67
End: 2022-09-20

## 2022-09-20 DIAGNOSIS — I49.5 SINUS NODE DYSFUNCTION (H): ICD-10-CM

## 2022-09-20 DIAGNOSIS — Z95.0 CARDIAC PACEMAKER IN SITU: ICD-10-CM

## 2022-09-20 DIAGNOSIS — I25.10 CORONARY ARTERY DISEASE INVOLVING NATIVE CORONARY ARTERY OF NATIVE HEART WITHOUT ANGINA PECTORIS: ICD-10-CM

## 2022-09-20 DIAGNOSIS — E78.00 HYPERCHOLESTEROLEMIA: ICD-10-CM

## 2022-09-20 RX ORDER — ROSUVASTATIN CALCIUM 40 MG/1
40 TABLET, COATED ORAL DAILY
Qty: 90 TABLET | Refills: 3 | Status: SHIPPED | OUTPATIENT
Start: 2022-09-20 | End: 2022-12-20

## 2022-09-20 NOTE — TELEPHONE ENCOUNTER
Reason for Call:  Other anticoag    Detailed comments: Patient forgot to take her medication last night. She took it this morning at 6:30am, please advise.    Phone Number Patient can be reached at: Home number on file [phone] (home)    Best Time: any    Can we leave a detailed message on this number? YES    Call taken on 9/20/2022 at 12:02 PM by Kimberly Aguirre

## 2022-09-20 NOTE — TELEPHONE ENCOUNTER
Writer spoke with patient. Confirmed she did take her dose this AM, which is what [facility] would advise. She will take her usual dose this evening. Patient did have additional questions regarding alcohol use and warfarin and her rosuvastatin medication. Writer explained the interaction with rosuvastatin/alcohol with warfarin. Patient does drink 1-2 drinks per week. Patient verbalized understanding. No further questions/concerns at this time. [facility] will call patient with next INR with further dosing instructions.     Ashley Garcia RN, BSN  [facility] Anticoagulation Clinic  [phone]

## 2022-09-20 NOTE — TELEPHONE ENCOUNTER
Reason for Call:  Other call back and prescription    Detailed comments: patient wanting the next time she is prescribed rosuvastatin (CRESTOR) 20 MG tablet for it to be 40mg instead and a years worth. Patient states that is what she is used to and doesn't understand why it changed. Please call pt back to discuss. Thank you    Phone Number Patient can be reached at: Home number on file [phone] (home)    Best Time: any    Can we leave a detailed message on this number? YES    Call taken on 9/20/2022 at 1:04 PM by Chani Doyle

## 2022-09-22 ENCOUNTER — LAB (OUTPATIENT)
Dept: LAB | Facility: CLINIC | Age: 67
End: 2022-09-22
Payer: COMMERCIAL

## 2022-09-22 ENCOUNTER — ANTICOAGULATION THERAPY VISIT (OUTPATIENT)
Dept: ANTICOAGULATION | Facility: CLINIC | Age: 67
End: 2022-09-22

## 2022-09-22 DIAGNOSIS — I48.0 PAROXYSMAL ATRIAL FIBRILLATION (H): ICD-10-CM

## 2022-09-22 DIAGNOSIS — I48.0 PAROXYSMAL ATRIAL FIBRILLATION (H): Primary | ICD-10-CM

## 2022-09-22 LAB — INR BLD: 1.2 (ref 0.9–1.1)

## 2022-09-22 PROCEDURE — 85610 PROTHROMBIN TIME: CPT

## 2022-09-22 PROCEDURE — 36416 COLLJ CAPILLARY BLOOD SPEC: CPT

## 2022-09-22 NOTE — PROGRESS NOTES
ANTICOAGULATION MANAGEMENT     Tatiana Skelton 67 year old female is on warfarin with subtherapeutic INR result. (Goal INR 2.0-3.0)    Recent labs: (last 7 days)     09/22/22  0952   INR 1.2*       ASSESSMENT       Source(s): Chart Review and Patient/Caregiver Call       Warfarin doses taken: Warfarin taken as instructed    Diet: No new diet changes identified    New illness, injury, or hospitalization: No    Medication/supplement changes: None noted    Signs or symptoms of bleeding or clotting: No    Previous INR: Subtherapeutic    Additional findings: New start on day 9 of warfarin       PLAN     Recommended plan for no diet, medication or health factor changes affecting INR     Dosing Instructions: Increase your warfarin dose (28.6% change) with next INR in 4 days       Summary  As of 9/22/2022    Full warfarin instructions:  5 mg every Mon, Wed, Fri; 7.5 mg all other days   Next INR check:  9/26/2022             Telephone call with Jaimie who verbalizes understanding and agrees to plan and who agrees to plan and repeated back plan correctly    Lab visit scheduled    Education provided: Please call back if any changes to your diet, medications or how you've been taking warfarin, Goal range and significance of current result, Importance of therapeutic range and Importance of following up at instructed interval    Plan made with [facility] Pharmacist Berenice Carpio, RN  Anticoagulation Clinic  9/22/2022    _______________________________________________________________________     Anticoagulation Episode Summary     Current INR goal:  2.0-3.0   TTR:  0.0 % (3 d)   Target end date:  1/1/2023   Send INR reminders to:  [facility]    Indications    Paroxysmal atrial fibrillation (H) [I48.0]           Comments:           Anticoagulation Care Providers     Provider Role Specialty Phone number    Doris Fraser, HERMILA CNP Referring Cardiovascular Disease [phone]

## 2022-09-26 ENCOUNTER — LAB (OUTPATIENT)
Dept: LAB | Facility: CLINIC | Age: 67
End: 2022-09-26
Payer: COMMERCIAL

## 2022-09-26 ENCOUNTER — ANTICOAGULATION THERAPY VISIT (OUTPATIENT)
Dept: ANTICOAGULATION | Facility: CLINIC | Age: 67
End: 2022-09-26

## 2022-09-26 DIAGNOSIS — I48.0 PAROXYSMAL ATRIAL FIBRILLATION (H): Primary | ICD-10-CM

## 2022-09-26 DIAGNOSIS — I48.0 PAROXYSMAL ATRIAL FIBRILLATION (H): ICD-10-CM

## 2022-09-26 LAB — INR BLD: 1.1 (ref 0.9–1.1)

## 2022-09-26 PROCEDURE — 36416 COLLJ CAPILLARY BLOOD SPEC: CPT

## 2022-09-26 PROCEDURE — 85610 PROTHROMBIN TIME: CPT

## 2022-09-26 NOTE — PROGRESS NOTES
ANTICOAGULATION MANAGEMENT     Tatiana Skelton 67 year old female is on warfarin with subtherapeutic INR result. (Goal INR 2.0-3.0)    Recent labs: (last 7 days)     09/26/22  0901   INR 1.1       ASSESSMENT       Source(s): Chart Review and Patient/Caregiver Call       Warfarin doses taken: Warfarin taken as instructed    Diet: No new diet changes identified    New illness, injury, or hospitalization: No    Medication/supplement changes: None noted    Signs or symptoms of bleeding or clotting: No    Previous INR: Subtherapeutic    Additional findings: New start on day 12 of warfarin       PLAN     Recommended plan for no diet, medication or health factor changes affecting INR     Dosing Instructions: Increase your warfarin dose (22.2% change) with next INR in 3 days       Summary  As of 9/26/2022    Full warfarin instructions:  10 mg every Mon; 7.5 mg all other days   Next INR check:  9/29/2022             Telephone call with Jaimie who verbalizes understanding and agrees to plan and who agrees to plan and repeated back plan correctly    Lab visit scheduled    Education provided: Please call back if any changes to your diet, medications or how you've been taking warfarin, Goal range and significance of current result, Importance of therapeutic range and Warfarin tablet strength change; remove and/or discard previous strength from medication supply    Plan made with [facility] Pharmacist Berenice Carpio, RN  Anticoagulation Clinic  9/26/2022    _______________________________________________________________________     Anticoagulation Episode Summary     Current INR goal:  2.0-3.0   TTR:  0.0 % (1 wk)   Target end date:  1/1/2023   Send INR reminders to:  [facility]    Indications    Paroxysmal atrial fibrillation (H) [I48.0]           Comments:           Anticoagulation Care Providers     Provider Role Specialty Phone number    Doris Fraser, APRN CNP Referring Cardiovascular Disease  [phone]

## 2022-09-29 ENCOUNTER — ANTICOAGULATION THERAPY VISIT (OUTPATIENT)
Dept: ANTICOAGULATION | Facility: CLINIC | Age: 67
End: 2022-09-29

## 2022-09-29 ENCOUNTER — LAB (OUTPATIENT)
Dept: LAB | Facility: CLINIC | Age: 67
End: 2022-09-29
Payer: COMMERCIAL

## 2022-09-29 DIAGNOSIS — I48.0 PAROXYSMAL ATRIAL FIBRILLATION (H): ICD-10-CM

## 2022-09-29 DIAGNOSIS — I48.0 PAROXYSMAL ATRIAL FIBRILLATION (H): Primary | ICD-10-CM

## 2022-09-29 LAB — INR BLD: 1.7 (ref 0.9–1.1)

## 2022-09-29 PROCEDURE — 36416 COLLJ CAPILLARY BLOOD SPEC: CPT

## 2022-09-29 PROCEDURE — 85610 PROTHROMBIN TIME: CPT

## 2022-09-29 NOTE — PROGRESS NOTES
ANTICOAGULATION MANAGEMENT     Tatiana Skelton 67 year old female is on warfarin with subtherapeutic INR result. (Goal INR 2.0-3.0)    Recent labs: (last 7 days)     09/29/22  0854   INR 1.7*       ASSESSMENT       Source(s): Chart Review and Patient/Caregiver Call       Warfarin doses taken: Warfarin taken as instructed    Diet: No new diet changes identified    New illness, injury, or hospitalization: No    Medication/supplement changes: None noted    Signs or symptoms of bleeding or clotting: No    Previous INR: Subtherapeutic    Additional findings: New start on day 15 of warfarin       PLAN     Recommended plan for no diet, medication or health factor changes affecting INR     Dosing Instructions: Increase your warfarin dose (4.5% change) with next INR in 1 week       Summary  As of 9/29/2022    Full warfarin instructions:  10 mg every Mon, Thu; 7.5 mg all other days   Next INR check:  10/6/2022             Telephone call with Jaimie who verbalizes understanding and agrees to plan and who agrees to plan and repeated back plan correctly    Lab visit scheduled    Education provided: Please call back if any changes to your diet, medications or how you've been taking warfarin    Plan made with [facility] Pharmacist Berenice Carpio, RN  Anticoagulation Clinic  9/29/2022    _______________________________________________________________________     Anticoagulation Episode Summary     Current INR goal:  2.0-3.0   TTR:  0.0 % (1.4 wk)   Target end date:  1/1/2023   [facility]    Indications    Paroxysmal atrial fibrillation (H) [I48.0]           Comments:           Anticoagulation Care Providers     Provider Role Specialty Phone number    Doris Fraser, APRN CNP Referring Cardiovascular Disease [phone]

## 2022-10-06 ENCOUNTER — ANTICOAGULATION THERAPY VISIT (OUTPATIENT)
Dept: ANTICOAGULATION | Facility: CLINIC | Age: 67
End: 2022-10-06

## 2022-10-06 ENCOUNTER — LAB (OUTPATIENT)
Dept: LAB | Facility: CLINIC | Age: 67
End: 2022-10-06
Payer: COMMERCIAL

## 2022-10-06 DIAGNOSIS — I48.0 PAROXYSMAL ATRIAL FIBRILLATION (H): Primary | ICD-10-CM

## 2022-10-06 DIAGNOSIS — I48.0 PAROXYSMAL ATRIAL FIBRILLATION (H): ICD-10-CM

## 2022-10-06 LAB — INR BLD: 2.1 (ref 0.9–1.1)

## 2022-10-06 PROCEDURE — 85610 PROTHROMBIN TIME: CPT

## 2022-10-06 PROCEDURE — 36416 COLLJ CAPILLARY BLOOD SPEC: CPT

## 2022-10-06 NOTE — PROGRESS NOTES
ANTICOAGULATION MANAGEMENT     Tatiana Skelton 67 year old female is on warfarin with therapeutic INR result. (Goal INR 2.0-3.0)    Recent labs: (last 7 days)     10/06/22  0814   INR 2.1*       ASSESSMENT       Source(s): Chart Review and Patient/Caregiver Call       Warfarin doses taken: Warfarin taken as instructed    Diet: No new diet changes identified    New illness, injury, or hospitalization: No    Medication/supplement changes: None noted    Signs or symptoms of bleeding or clotting: No    Previous INR: Subtherapeutic    Additional findings: None       PLAN     Recommended plan for no diet, medication or health factor changes affecting INR     Dosing Instructions: Continue your current warfarin dose with next INR in 10 days       Summary  As of 10/6/2022    Full warfarin instructions:  10 mg every Mon, Thu; 7.5 mg all other days   Next INR check:  10/17/2022             Telephone call with Jaimie who verbalizes understanding and agrees to plan and who agrees to plan and repeated back plan correctly    Lab visit scheduled    Education provided: Please call back if any changes to your diet, medications or how you've been taking warfarin    Plan made per [facility] anticoagulation protocol    Lois Carpio, RN  Anticoagulation Clinic  10/6/2022    _______________________________________________________________________     Anticoagulation Episode Summary     Current INR goal:  2.0-3.0   TTR:  10.3 % (2.3 wk)   Target end date:  1/1/2023   Send INR reminders to:  L[facility]    Indications    Paroxysmal atrial fibrillation (H) [I48.0]           Comments:           Anticoagulation Care Providers     Provider Role Specialty Phone number    Doris Fraser, APRN CNP Referring Cardiovascular Disease [phone]

## 2022-10-07 ENCOUNTER — TELEPHONE (OUTPATIENT)
Dept: ANTICOAGULATION | Facility: CLINIC | Age: 67
End: 2022-10-07

## 2022-10-07 DIAGNOSIS — I48.0 PAROXYSMAL ATRIAL FIBRILLATION (H): Primary | ICD-10-CM

## 2022-10-07 NOTE — PROGRESS NOTES
Pt missed last nights dose.   I advised her to take 2.5 mg this AM and her normal 7.5 mg tonight  Karen Miller RN

## 2022-10-09 ENCOUNTER — HEALTH MAINTENANCE LETTER (OUTPATIENT)
Age: 67
End: 2022-10-09

## 2022-10-10 ENCOUNTER — TELEPHONE (OUTPATIENT)
Dept: FAMILY MEDICINE | Facility: CLINIC | Age: 67
End: 2022-10-10

## 2022-10-10 DIAGNOSIS — I48.0 PAROXYSMAL ATRIAL FIBRILLATION (H): ICD-10-CM

## 2022-10-10 RX ORDER — WARFARIN SODIUM 5 MG/1
TABLET ORAL
Qty: 50 TABLET | Refills: 1 | Status: SHIPPED | OUTPATIENT
Start: 2022-10-10 | End: 2022-11-10

## 2022-10-10 NOTE — TELEPHONE ENCOUNTER
Spoke with pt. She missed her 7.5 mg dose yestereday (Sunday). Theresa advised that she take 1 tablet (5 mg) now, then resume her usual dose this evening. Refill sent.     Jacque Koehler, NANCYN, RN- Coumadin Clinic RN

## 2022-10-10 NOTE — TELEPHONE ENCOUNTER
Reason for Call:  INR follow up    Detailed comments: Patient states she missed her dose last night and was told to contact the clinic regarding this and needs renewal of Warfarin sent to pharmacy    Phone Number Patient can be reached at: Home number on file [phone] (home)    Best Time: any    Can we leave a detailed message on this number? YES    Call taken on 10/10/2022 at 8:54 AM by Luana Porras

## 2022-10-16 ENCOUNTER — NURSE TRIAGE (OUTPATIENT)
Dept: NURSING | Facility: CLINIC | Age: 67
End: 2022-10-16

## 2022-10-16 NOTE — TELEPHONE ENCOUNTER
Patient forgot to take dose of warfarin last night.  Patient normally takes 1.5 pills on Friday 7.5 mg.  This morning patient took 5 mg.  Patient is requesting to have on call MD paged regarding which dose she should take tonight.  FNA paged on call Yadira Roger at 8:35 am.  MD Advised to take 2.5 mg now and then normal dose tonight.  FNA relayed message to patient and patient agrees.      Reason for Disposition    [1] Caller has URGENT medicine question about med that PCP or specialist prescribed AND [2] triager unable to answer question    Additional Information    Negative: [1] Intentional drug overdose AND [2] suicidal thoughts or ideas    Negative: MORE THAN A DOUBLE DOSE of a prescription or over-the-counter (OTC) drug    Negative: [1] DOUBLE DOSE (an extra dose or lesser amount) of prescription drug AND [2] any symptoms (e.g., dizziness, nausea, pain, sleepiness)    Negative: [1] DOUBLE DOSE (an extra dose or lesser amount) of over-the-counter (OTC) drug AND [2] any symptoms (e.g., dizziness, nausea, pain, sleepiness)    Negative: Took another person's prescription drug    Negative: [1] DOUBLE DOSE (an extra dose or lesser amount) of prescription drug AND [2] NO symptoms (Exception: a double dose of antibiotics)    Negative: Diabetes drug error or overdose (e.g., took wrong type of insulin or took extra dose)    Negative: [1] Prescription not at pharmacy AND [2] was prescribed by PCP recently (Exception: triager has access to EMR and prescription is recorded there. Go to Home Care and confirm for pharmacy.)    Negative: [1] Pharmacy calling with prescription question AND [2] triager unable to answer question    Protocols used: MEDICATION QUESTION CALL-A-

## 2022-10-17 ENCOUNTER — ANTICOAGULATION THERAPY VISIT (OUTPATIENT)
Dept: ANTICOAGULATION | Facility: CLINIC | Age: 67
End: 2022-10-17

## 2022-10-17 ENCOUNTER — LAB (OUTPATIENT)
Dept: LAB | Facility: CLINIC | Age: 67
End: 2022-10-17
Payer: COMMERCIAL

## 2022-10-17 DIAGNOSIS — I48.0 PAROXYSMAL ATRIAL FIBRILLATION (H): Primary | ICD-10-CM

## 2022-10-17 DIAGNOSIS — I48.0 PAROXYSMAL ATRIAL FIBRILLATION (H): ICD-10-CM

## 2022-10-17 LAB — INR BLD: 1.4 (ref 0.9–1.1)

## 2022-10-17 PROCEDURE — 36416 COLLJ CAPILLARY BLOOD SPEC: CPT

## 2022-10-17 PROCEDURE — 85610 PROTHROMBIN TIME: CPT

## 2022-10-17 NOTE — PROGRESS NOTES
ANTICOAGULATION MANAGEMENT     Tatiana Skelton 67 year old female is on warfarin with subtherapeutic INR result. (Goal INR 2.0-3.0)    Recent labs: (last 7 days)     10/17/22  1545   INR 1.4*       ASSESSMENT       Source(s): Chart Review and Patient/Caregiver Call       Warfarin doses taken: Missed dose(s) may be affecting INR    Diet: No new diet changes identified    New illness, injury, or hospitalization: No    Medication/supplement changes: None noted    Signs or symptoms of bleeding or clotting: No    Previous INR: Therapeutic last visit; previously outside of goal range    Additional findings: None       PLAN     Recommended plan for temporary change(s) affecting INR     Dosing Instructions: Continue your current warfarin dose with next INR in 1 week       Summary  As of 10/17/2022    Full warfarin instructions:  10 mg every Mon, Thu; 7.5 mg all other days   Next INR check:  10/24/2022             Telephone call with Jaimie who verbalizes understanding and agrees to plan and who agrees to plan and repeated back plan correctly    Lab visit scheduled    Education provided: Please call back if any changes to your diet, medications or how you've been taking warfarin, Goal range and significance of current result, Importance of therapeutic range, Importance of following up at instructed interval, Importance of taking warfarin as instructed, Monitoring for bleeding signs and symptoms, Monitoring for clotting signs and symptoms and When to seek medical attention/emergency care    Plan made per [facility] anticoagulation protocol    Lois Carpio RN  Anticoagulation Clinic  10/17/2022    _______________________________________________________________________     Anticoagulation Episode Summary     Current INR goal:  2.0-3.0   TTR:  11.9 % (4 wk)   Target end date:  1/1/2023   Send INR reminders to:  LOU MEJ[facility]    Indications    Paroxysmal atrial fibrillation (H) [I48.0]           Comments:            Anticoagulation Care Providers     Provider Role Specialty Phone number    Doris Fraser, APRN CNP Referring Cardiovascular Disease [phone]

## 2022-10-24 ENCOUNTER — LAB (OUTPATIENT)
Dept: LAB | Facility: CLINIC | Age: 67
End: 2022-10-24
Payer: COMMERCIAL

## 2022-10-24 ENCOUNTER — ANTICOAGULATION THERAPY VISIT (OUTPATIENT)
Dept: ANTICOAGULATION | Facility: CLINIC | Age: 67
End: 2022-10-24

## 2022-10-24 DIAGNOSIS — Z11.4 SCREENING FOR HIV (HUMAN IMMUNODEFICIENCY VIRUS): Primary | ICD-10-CM

## 2022-10-24 DIAGNOSIS — I48.0 PAROXYSMAL ATRIAL FIBRILLATION (H): Primary | ICD-10-CM

## 2022-10-24 DIAGNOSIS — I48.0 PAROXYSMAL ATRIAL FIBRILLATION (H): ICD-10-CM

## 2022-10-24 LAB — INR BLD: 2 (ref 0.9–1.1)

## 2022-10-24 PROCEDURE — 36416 COLLJ CAPILLARY BLOOD SPEC: CPT

## 2022-10-24 PROCEDURE — 85610 PROTHROMBIN TIME: CPT

## 2022-10-24 NOTE — PROGRESS NOTES
ANTICOAGULATION MANAGEMENT     Tatiana Skelton 67 year old female is on warfarin with therapeutic INR result. (Goal INR 2.0-3.0)    Recent labs: (last 7 days)     10/24/22  1553   INR 2.0*       ASSESSMENT       Source(s): Chart Review and Patient/Caregiver Call       Warfarin doses taken: Warfarin taken as instructed    Diet: No new diet changes identified    New illness, injury, or hospitalization: No    Medication/supplement changes: None noted    Signs or symptoms of bleeding or clotting: No    Previous INR: Subtherapeutic    Additional findings: states was more active this past week around the yard        PLAN     Recommended plan for no diet, medication or health factor changes affecting INR     Dosing Instructions: Continue your current warfarin dose with next INR in 2 weeks       Summary  As of 10/24/2022    Full warfarin instructions:  10 mg every Mon, Thu; 7.5 mg all other days; Starting 10/24/2022   Next INR check:  11/7/2022             Telephone call with Jaimie who verbalizes understanding and agrees to plan    Lab visit scheduled    Education provided:     Contact [phone]  with any changes, questions or concerns.     Plan made per [facility] anticoagulation protocol    Paty Harris RN  Anticoagulation Clinic  10/24/2022    _______________________________________________________________________     Anticoagulation Episode Summary     Current INR goal:  2.0-3.0   TTR:  9.5 % (1.2 mo)   Target end date:  1/1/2023   Send INR reminders to:  LOU MEJIA    Indications    Paroxysmal atrial fibrillation (H) [I48.0]           Comments:           Anticoagulation Care Providers     Provider Role Specialty Phone number    Doris Fraser, APRN CNP Referring Cardiovascular Disease [phone]

## 2022-11-03 ENCOUNTER — TELEPHONE (OUTPATIENT)
Dept: FAMILY MEDICINE | Facility: CLINIC | Age: 67
End: 2022-11-03

## 2022-11-03 ENCOUNTER — LAB (OUTPATIENT)
Dept: LAB | Facility: CLINIC | Age: 67
End: 2022-11-03
Payer: COMMERCIAL

## 2022-11-03 ENCOUNTER — ANTICOAGULATION THERAPY VISIT (OUTPATIENT)
Dept: ANTICOAGULATION | Facility: CLINIC | Age: 67
End: 2022-11-03

## 2022-11-03 DIAGNOSIS — I48.0 PAROXYSMAL ATRIAL FIBRILLATION (H): ICD-10-CM

## 2022-11-03 DIAGNOSIS — I48.0 PAROXYSMAL ATRIAL FIBRILLATION (H): Primary | ICD-10-CM

## 2022-11-03 LAB — INR BLD: 2.2 (ref 0.9–1.1)

## 2022-11-03 PROCEDURE — 36416 COLLJ CAPILLARY BLOOD SPEC: CPT

## 2022-11-03 PROCEDURE — 85610 PROTHROMBIN TIME: CPT

## 2022-11-03 NOTE — PROGRESS NOTES
ANTICOAGULATION MANAGEMENT     Tatiana Skelton 67 year old female is on warfarin with therapeutic INR result. (Goal INR 2.0-3.0)    Recent labs: (last 7 days)     11/03/22  1552   INR 2.2*       ASSESSMENT       Source(s): Chart Review and Patient/Caregiver Call       Warfarin doses taken: Warfarin taken as instructed    Diet: No new diet changes identified    New illness, injury, or hospitalization: No    Medication/supplement changes: None noted    Signs or symptoms of bleeding or clotting: Yes: Pt called earlier stating she has some bruises popping up on her legs she doesn't remember bumping herself. Denies any other bleeding    Previous INR: Therapeutic last visit; previously outside of goal range    Additional findings: None       PLAN     Recommended plan for no diet, medication or health factor changes affecting INR     Dosing Instructions: Continue your current warfarin dose with next INR in 5 days  - to keep a close eye on INR and discuss how the bruising is. Any more?     Summary  As of 11/3/2022    Full warfarin instructions:  10 mg every Mon, Thu; 7.5 mg all other days; Starting 11/3/2022   Next INR check:  11/8/2022             Telephone call with Jaimie who verbalizes understanding and agrees to plan and who agrees to plan and repeated back plan correctly    Lab visit scheduled    Education provided:     Please call back if any changes to your diet, medications or how you've been taking warfarin    Plan made per [facility] anticoagulation protocol    Lois Carpio RN  Anticoagulation Clinic  11/3/2022    _______________________________________________________________________     Anticoagulation Episode Summary     Current INR goal:  2.0-3.0   TTR:  29.5 % (1.5 mo)   Target end date:  1/1/2023   Send INR reminders to:  LOU MEJIA    Indications    Paroxysmal atrial fibrillation (H) [I48.0]           Comments:           Anticoagulation Care Providers     Provider Role Specialty Phone number     Doris Fraser, APRN CNP Referring Cardiovascular Disease [phone]

## 2022-11-03 NOTE — TELEPHONE ENCOUNTER
Reason for Call:  Other     Detailed comments: PT has questions regarding meds & unexplained bruising     Phone Number Patient can be reached at: Cell number on file:    Telephone Information:   Mobile [phone]       Best Time: any    Can we leave a detailed message on this number? NO  Call taken on 11/3/2022 at 9:16 AM by Luz Singh

## 2022-11-03 NOTE — TELEPHONE ENCOUNTER
Pt states she has multiple bruises on her legs and can't remember injuring her self. Denies any other signs of bleeding. Appt scheduled today at 3:40 to see where her INR is sitting. Patient verbalized understanding and agrees with plan of care. Pt had no further questions or concerns at this time.     Lois Carpio RN, BSN  [facility] Anticoagulation Team

## 2022-11-09 ENCOUNTER — NURSE TRIAGE (OUTPATIENT)
Dept: CARDIOLOGY | Facility: CLINIC | Age: 67
End: 2022-11-09

## 2022-11-09 NOTE — TELEPHONE ENCOUNTER
I am not sure what this is referring to. Her water pill or her coumadin. Pt does have a refill on her coumadin on file. Closing from an [facility] stand point at this time. Lois Carpio RN, BSN  [facility] Anticoagulation Team

## 2022-11-09 NOTE — TELEPHONE ENCOUNTER
"Pharmacy requesting that they be contacted regarding warfarin prescription and that it be put on a \"ready refill\" status. Message routed to PCP.    Pamela Bender RN on 11/9/2022 at 11:22 AM       Reason for Disposition    [1] Caller has NON-URGENT medicine question about med that PCP prescribed AND [2] triager unable to answer question    Additional Information    Negative: [1] Intentional drug overdose AND [2] suicidal thoughts or ideas    Negative: Drug overdose and triager unable to answer question    Negative: Caller requesting a renewal or refill of a medicine patient is currently taking    Negative: Caller requesting information unrelated to medicine    Negative: Caller requesting information about COVID-19 Vaccine    Negative: Caller requesting information about Emergency Contraception    Negative: Caller requesting information about Combined Birth Control Pills    Negative: Caller requesting information about Progestin Birth Control Pills    Negative: Caller requesting information about Post-Op pain or medicines    Negative: Caller requesting a prescription antibiotic (such as Penicillin) for Strep throat and has a positive culture result    Negative: Caller requesting a prescription anti-viral med (such as Tamiflu) and has influenza (flu) symptoms    Negative: Immunization reaction suspected    Negative: Rash while taking a medicine or within 3 days of stopping it    Negative: [1] Asthma and [2] having symptoms of asthma (cough, wheezing, etc.)    Negative: [1] Symptom of illness (e.g., headache, abdominal pain, earache, vomiting) AND [2] more than mild    Negative: Breastfeeding questions about mother's medicines and diet    Negative: MORE THAN A DOUBLE DOSE of a prescription or over-the-counter (OTC) drug    Negative: [1] DOUBLE DOSE (an extra dose or lesser amount) of prescription drug AND [2] any symptoms (e.g., dizziness, nausea, pain, sleepiness)    Negative: [1] DOUBLE DOSE (an extra dose or lesser " amount) of over-the-counter (OTC) drug AND [2] any symptoms (e.g., dizziness, nausea, pain, sleepiness)    Negative: Took another person's prescription drug    Negative: [1] DOUBLE DOSE (an extra dose or lesser amount) of prescription drug AND [2] NO symptoms (Exception: a double dose of antibiotics)    Negative: Diabetes drug error or overdose (e.g., took wrong type of insulin or took extra dose)    Negative: [1] Prescription not at pharmacy AND [2] was prescribed by PCP recently (Exception: triager has access to EMR and prescription is recorded there. Go to Home Care and confirm for pharmacy.)    Negative: [1] Pharmacy calling with prescription question AND [2] triager unable to answer question    Negative: [1] Caller has URGENT medicine question about med that PCP or specialist prescribed AND [2] triager unable to answer question    Negative: Medicine patch causing local rash or itching    Negative: [1] Caller has medicine question about med NOT prescribed by PCP AND [2] triager unable to answer question (e.g., compatibility with other med, storage)    Negative: Prescription request for new medicine (not a refill)    Protocols used: MEDICATION QUESTION CALL-A-

## 2022-11-09 NOTE — TELEPHONE ENCOUNTER
"[facility] Call Center    Phone Message    May a detailed message be left on voicemail: yes     Reason for Call: Medication Question or concern regarding medication   Prescription Clarification  Name of Medication: Patient did not specify name, \"Water pill\"  Prescribing Provider: Doris Fraser   Pharmacy:  [id number] - [address]   What on the order needs clarification? Jaimie calling to request that Doris puts this medication on a 90 day \"ready refill\" so that she does not have to call ahead each time for it to be refilled. She says the pharmacy told her that the prescription has to be written this way. Please send 'ready refill' prescription to pharmacy to be automatically refilled every 90 days and please call her back once complete.    Thank you!  [facility]             Action Taken: Other: Cardiology    Travel Screening: Not Applicable                                                                        "

## 2022-11-10 ENCOUNTER — ANTICOAGULATION THERAPY VISIT (OUTPATIENT)
Dept: ANTICOAGULATION | Facility: CLINIC | Age: 67
End: 2022-11-10

## 2022-11-10 ENCOUNTER — LAB (OUTPATIENT)
Dept: LAB | Facility: CLINIC | Age: 67
End: 2022-11-10
Payer: COMMERCIAL

## 2022-11-10 DIAGNOSIS — I48.0 PAROXYSMAL ATRIAL FIBRILLATION (H): Primary | ICD-10-CM

## 2022-11-10 DIAGNOSIS — I48.0 PAROXYSMAL ATRIAL FIBRILLATION (H): ICD-10-CM

## 2022-11-10 LAB — INR BLD: 2.2 (ref 0.9–1.1)

## 2022-11-10 PROCEDURE — 36416 COLLJ CAPILLARY BLOOD SPEC: CPT

## 2022-11-10 PROCEDURE — 85610 PROTHROMBIN TIME: CPT

## 2022-11-10 RX ORDER — WARFARIN SODIUM 5 MG/1
TABLET ORAL
Qty: 150 TABLET | Refills: 1 | Status: SHIPPED | OUTPATIENT
Start: 2022-11-10 | End: 2022-11-22

## 2022-11-10 NOTE — PROGRESS NOTES
ANTICOAGULATION MANAGEMENT     Tatiana Skelton 67 year old female is on warfarin with therapeutic INR result. (Goal INR 2.0-3.0)    Recent labs: (last 7 days)     11/10/22  1555   INR 2.2*       ASSESSMENT       Source(s): Chart Review and Patient/Caregiver Call       Warfarin doses taken: Warfarin taken as instructed    Diet: No new diet changes identified    New illness, injury, or hospitalization: No    Medication/supplement changes: None noted    Signs or symptoms of bleeding or clotting: No    Previous INR: Therapeutic last 2(+) visits    Additional findings: Refill needed today. Tatiana meets all criteria for refill (current [facility] referral, office visit with referring provider/group in last year, lab monitoring up to date or not exceeding 2 weeks overdue). Rx instructions and quantity supplied updated to match patient's current dosing plan. Warfarin 90 day supply with 1 refill granted per [facility] protocol  and bruising is improving per patient.       PLAN     Recommended plan for no diet, medication or health factor changes affecting INR     Dosing Instructions: Continue your current warfarin dose with next INR in 2 weeks       Summary  As of 11/10/2022    Full warfarin instructions:  10 mg every Mon, Thu; 7.5 mg all other days; Starting 11/10/2022   Next INR check:  11/23/2022             Telephone call with Jaimie who verbalizes understanding and agrees to plan    Lab visit scheduled    Education provided:     Please call back if any changes to your diet, medications or how you've been taking warfarin    Symptom monitoring: monitoring for bleeding signs and symptoms and when to seek medical attention/emergency care    Contact [phone]  with any changes, questions or concerns.     Plan made per [facility] anticoagulation protocol    Kimberley Brian RN  Anticoagulation Clinic  11/10/2022    _______________________________________________________________________     Anticoagulation Episode Summary     Current INR  goal:  2.0-3.0   TTR:  38.9 % (1.7 mo)   Target end date:  1/1/2023   Send INR reminders to:  LOU MEJIA    Indications    Paroxysmal atrial fibrillation (H) [I48.0]           Comments:           Anticoagulation Care Providers     Provider Role Specialty Phone number    Doris Fraser, HERMILA CNP Referring Cardiovascular Disease [phone]

## 2022-11-22 DIAGNOSIS — I48.0 PAROXYSMAL ATRIAL FIBRILLATION (H): ICD-10-CM

## 2022-11-22 RX ORDER — WARFARIN SODIUM 5 MG/1
TABLET ORAL
Qty: 140 TABLET | Refills: 1 | Status: SHIPPED | OUTPATIENT
Start: 2022-11-22 | End: 2022-12-20

## 2022-11-22 NOTE — TELEPHONE ENCOUNTER
ANTICOAGULATION MANAGEMENT:  Medication Refill    Anticoagulation Summary  As of 11/10/2022    Warfarin maintenance plan:  10 mg (5 mg x 2) every Mon, Thu; 7.5 mg (5 mg x 1.5) all other days; Starting 11/10/2022   Next INR check:  11/23/2022   Target end date:  1/1/2023    Indications    Paroxysmal atrial fibrillation (H) [I48.0]             Anticoagulation Care Providers     Provider Role Specialty Phone number    Doris Fraser, APRN CNP Referring Cardiovascular Disease [phone]          Visit with referring provider/group within last year: Yes    [facility] referral signed within last year: Yes    Tatiana meets all criteria for refill (current [facility] referral, office visit with referring provider/group in last year, lab monitoring up to date or not exceeding 2 weeks overdue). Rx instructions and quantity supplied updated to match patient's current dosing plan. Warfarin 90 day supply with 1 refill granted per [facility] protocol     Karen Miller RN  Anticoagulation Clinic

## 2022-11-23 ENCOUNTER — LAB (OUTPATIENT)
Dept: LAB | Facility: CLINIC | Age: 67
End: 2022-11-23
Payer: COMMERCIAL

## 2022-11-23 ENCOUNTER — ANTICOAGULATION THERAPY VISIT (OUTPATIENT)
Dept: ANTICOAGULATION | Facility: CLINIC | Age: 67
End: 2022-11-23

## 2022-11-23 DIAGNOSIS — I48.0 PAROXYSMAL ATRIAL FIBRILLATION (H): Primary | ICD-10-CM

## 2022-11-23 DIAGNOSIS — I48.0 PAROXYSMAL ATRIAL FIBRILLATION (H): ICD-10-CM

## 2022-11-23 LAB — INR BLD: 2 (ref 0.9–1.1)

## 2022-11-23 PROCEDURE — 36416 COLLJ CAPILLARY BLOOD SPEC: CPT

## 2022-11-23 PROCEDURE — 85610 PROTHROMBIN TIME: CPT

## 2022-11-23 NOTE — PROGRESS NOTES
ANTICOAGULATION MANAGEMENT     Tatiana Skelton 67 year old female is on warfarin with therapeutic INR result. (Goal INR 2.0-3.0)    Recent labs: (last 7 days)     11/23/22  1550   INR 2.0*       ASSESSMENT       Source(s): Chart Review and Patient/Caregiver Call       Warfarin doses taken: Warfarin taken as instructed    Diet: No new diet changes identified    New illness, injury, or hospitalization: No    Medication/supplement changes: None noted    Signs or symptoms of bleeding or clotting: No    Previous INR: Therapeutic last 2(+) visits    Additional findings: None       PLAN     Recommended plan for no diet, medication or health factor changes affecting INR     Dosing Instructions: Continue your current warfarin dose with next INR in 2 weeks       Summary  As of 11/23/2022    Full warfarin instructions:  10 mg every Mon, Thu; 7.5 mg all other days; Starting 11/23/2022   Next INR check:  12/7/2022             Telephone call with Jaimie who verbalizes understanding and agrees to plan and who agrees to plan and repeated back plan correctly    Lab visit scheduled    Education provided:     Please call back if any changes to your diet, medications or how you've been taking warfarin    Plan made per [facility] anticoagulation protocol    Lois Carpio RN  Anticoagulation Clinic  11/23/2022    _______________________________________________________________________     Anticoagulation Episode Summary     Current INR goal:  2.0-3.0   TTR:  51.1 % (2.2 mo)   Target end date:  1/1/2023   Send INR reminders to:  LOU MEJIA    Indications    Paroxysmal atrial fibrillation (H) [I48.0]           Comments:           Anticoagulation Care Providers     Provider Role Specialty Phone number    Doris Fraser, APRN CNP Referring Cardiovascular Disease [phone]

## 2022-12-07 ENCOUNTER — ANTICOAGULATION THERAPY VISIT (OUTPATIENT)
Dept: ANTICOAGULATION | Facility: CLINIC | Age: 67
End: 2022-12-07

## 2022-12-07 ENCOUNTER — LAB (OUTPATIENT)
Dept: LAB | Facility: CLINIC | Age: 67
End: 2022-12-07
Payer: COMMERCIAL

## 2022-12-07 DIAGNOSIS — I48.0 PAROXYSMAL ATRIAL FIBRILLATION (H): Primary | ICD-10-CM

## 2022-12-07 DIAGNOSIS — I48.0 PAROXYSMAL ATRIAL FIBRILLATION (H): ICD-10-CM

## 2022-12-07 LAB — INR BLD: 2.5 (ref 0.9–1.1)

## 2022-12-07 PROCEDURE — 85610 PROTHROMBIN TIME: CPT

## 2022-12-07 PROCEDURE — 36416 COLLJ CAPILLARY BLOOD SPEC: CPT

## 2022-12-07 NOTE — PROGRESS NOTES
ANTICOAGULATION MANAGEMENT     Tatiana Skelton 67 year old female is on warfarin with therapeutic INR result. (Goal INR 2.0-3.0)    Recent labs: (last 7 days)     12/07/22  1602   INR 2.5*       ASSESSMENT       Source(s): Chart Review and Patient/Caregiver Call       Warfarin doses taken: Warfarin taken as instructed    Diet: No new diet changes identified    New illness, injury, or hospitalization: No    Medication/supplement changes: None noted    Signs or symptoms of bleeding or clotting: No    Previous INR: Therapeutic last 2(+) visits    Additional findings: COVID booster and flu shot on Friday 12/2 - pt did feel crummy over the weekend but is feeling better    Upcoming dentist appt for a cleaning     PLAN     Recommended plan for no diet, medication or health factor changes affecting INR     Dosing Instructions: Continue your current warfarin dose with next INR in 3 weeks       Summary  As of 12/7/2022    Full warfarin instructions:  10 mg every Mon, Thu; 7.5 mg all other days; Starting 12/7/2022   Next INR check:  12/28/2022             Telephone call with Jaimie who verbalizes understanding and agrees to plan    Lab visit scheduled    Education provided:     Please call back if any changes to your diet, medications or how you've been taking warfarin    Plan made per [facility] anticoagulation protocol    Shanon Austin RN  Anticoagulation Clinic  12/7/2022    _______________________________________________________________________     Anticoagulation Episode Summary     Current INR goal:  2.0-3.0   TTR:  59.7 % (2.6 mo)   Target end date:  1/1/2023   Send INR reminders to:  LOU MEJIA    Indications    Paroxysmal atrial fibrillation (H) [I48.0]           Comments:           Anticoagulation Care Providers     Provider Role Specialty Phone number    Doris Fraser, APRN CNP Referring Cardiovascular Disease [phone]

## 2022-12-08 ENCOUNTER — NURSE TRIAGE (OUTPATIENT)
Dept: NURSING | Facility: CLINIC | Age: 67
End: 2022-12-08

## 2022-12-08 NOTE — TELEPHONE ENCOUNTER
Call from patient who is on Coumadin. She says her INR yesterday was 2.5.     Patient reports nose bleed once last night and 3 additional nosebleeds today.    She denies feeling weak or dizzy. Denies new bruises or spreading bruises.    Assessment/Disposition: be seen within 24 hrs.  Patient wants message to be sent to INR nurse team who can call her back with their recommendations.    Reviewed care advice with caller per RN triage protocol guideline. Advised to call back with worsening symptoms, concerns or questions. Caller verbalized understanding.      Yaya Murray, RN, BSN  Triage Nurse Advisor            Reason for Disposition    [1] Bleeding recurs 3 or more times in 24 hours AND [2] direct pressure applied correctly    Additional Information    Negative: Fainted or too weak to stand following large blood loss    Negative: Sounds like a life-threatening emergency to the triager    Negative: [1] Bleeding present > 30 minutes AND [2] using correct method of direct pressure    Negative: [1] Bleeding now AND [2] second call after being instructed in correct technique of direct pressure    Negative: Dizziness or lightheadedness    Negative: Pale skin (pallor) of new-onset or worsening    Negative: [1] Has nasal packing (inserted by health care provider to control bleeding) AND [2] new rash    Negative: [1] Has nasal packing AND [2] now bleeding around the packing (Exception: few drops or ooze)    Negative: Patient sounds very sick or weak to the triager    Protocols used: NOSEBLEED-A-

## 2022-12-09 NOTE — TELEPHONE ENCOUNTER
See note below.   Routing to provider's care team, as INR was within range (2.5) yesterday - this does not seem to be a Coumadin issue.   Karen Miller RN

## 2022-12-09 NOTE — TELEPHONE ENCOUNTER
RN did call and speak with patient for update. She said she has not had a nose bleed since yesterday afternoon. She did not have any last evening or today yet. She said she had had 5 nosebleeds total between Wednesday night and Thursday during the day. She said they lasted about 1/2 hour each time. Patient declines wanting to be seen right now. She said each times yesterday was from the right nostril, not sure about the ones before. She was mainly calling in to update the INR clinic as she was told to when she started coumadin.     Advised patient to call back if she has another nosebleed. We did review to pinch nose just below nose bone for 15 min if bleed again and if still bleeding after that to pinch again for 15 min. If continues to go to ED. Advised if nosebleed does not stop to go to ED. If becomes dizzy or lightheaded to go to ED.

## 2022-12-19 ENCOUNTER — ANCILLARY PROCEDURE (OUTPATIENT)
Dept: CARDIOLOGY | Facility: CLINIC | Age: 67
End: 2022-12-19
Attending: INTERNAL MEDICINE
Payer: COMMERCIAL

## 2022-12-19 DIAGNOSIS — I49.5 SINUS NODE DYSFUNCTION (H): ICD-10-CM

## 2022-12-19 DIAGNOSIS — Z95.0 CARDIAC PACEMAKER IN SITU: ICD-10-CM

## 2022-12-19 PROCEDURE — 93294 REM INTERROG EVL PM/LDLS PM: CPT | Performed by: INTERNAL MEDICINE

## 2022-12-19 PROCEDURE — 93296 REM INTERROG EVL PM/IDS: CPT | Performed by: INTERNAL MEDICINE

## 2022-12-20 ENCOUNTER — OFFICE VISIT (OUTPATIENT)
Dept: CARDIOLOGY | Facility: CLINIC | Age: 67
End: 2022-12-20
Payer: COMMERCIAL

## 2022-12-20 ENCOUNTER — TELEPHONE (OUTPATIENT)
Dept: CARDIOLOGY | Facility: CLINIC | Age: 67
End: 2022-12-20

## 2022-12-20 VITALS
WEIGHT: 178.3 LBS | BODY MASS INDEX: 27.02 KG/M2 | HEIGHT: 68 IN | OXYGEN SATURATION: 94 % | DIASTOLIC BLOOD PRESSURE: 74 MMHG | HEART RATE: 79 BPM | SYSTOLIC BLOOD PRESSURE: 118 MMHG

## 2022-12-20 DIAGNOSIS — E78.00 HYPERCHOLESTEROLEMIA: ICD-10-CM

## 2022-12-20 DIAGNOSIS — Z95.0 CARDIAC PACEMAKER IN SITU: ICD-10-CM

## 2022-12-20 DIAGNOSIS — I49.5 SINUS NODE DYSFUNCTION (H): Primary | ICD-10-CM

## 2022-12-20 DIAGNOSIS — I48.0 PAROXYSMAL ATRIAL FIBRILLATION (H): ICD-10-CM

## 2022-12-20 DIAGNOSIS — I49.5 SINUS NODE DYSFUNCTION (H): ICD-10-CM

## 2022-12-20 DIAGNOSIS — I25.10 CORONARY ARTERY DISEASE INVOLVING NATIVE CORONARY ARTERY OF NATIVE HEART WITHOUT ANGINA PECTORIS: ICD-10-CM

## 2022-12-20 LAB
MDC_IDC_LEAD_IMPLANT_DT: NORMAL
MDC_IDC_LEAD_IMPLANT_DT: NORMAL
MDC_IDC_LEAD_LOCATION: NORMAL
MDC_IDC_LEAD_LOCATION: NORMAL
MDC_IDC_LEAD_LOCATION_DETAIL_1: NORMAL
MDC_IDC_LEAD_LOCATION_DETAIL_1: NORMAL
MDC_IDC_LEAD_MFG: NORMAL
MDC_IDC_LEAD_MFG: NORMAL
MDC_IDC_LEAD_MODEL: NORMAL
MDC_IDC_LEAD_MODEL: NORMAL
MDC_IDC_LEAD_POLARITY_TYPE: NORMAL
MDC_IDC_LEAD_POLARITY_TYPE: NORMAL
MDC_IDC_LEAD_SERIAL: NORMAL
MDC_IDC_LEAD_SERIAL: NORMAL
MDC_IDC_LEAD_SPECIAL_FUNCTION: NORMAL
MDC_IDC_MSMT_BATTERY_DTM: NORMAL
MDC_IDC_MSMT_BATTERY_IMPEDANCE: 912 OHM
MDC_IDC_MSMT_BATTERY_REMAINING_LONGEVITY: 83 MO
MDC_IDC_MSMT_BATTERY_STATUS: NORMAL
MDC_IDC_MSMT_BATTERY_VOLTAGE: 2.77 V
MDC_IDC_MSMT_LEADCHNL_RA_IMPEDANCE_VALUE: 459 OHM
MDC_IDC_MSMT_LEADCHNL_RA_PACING_THRESHOLD_AMPLITUDE: 0.88 V
MDC_IDC_MSMT_LEADCHNL_RA_PACING_THRESHOLD_PULSEWIDTH: 0.4 MS
MDC_IDC_MSMT_LEADCHNL_RV_IMPEDANCE_VALUE: 669 OHM
MDC_IDC_MSMT_LEADCHNL_RV_PACING_THRESHOLD_AMPLITUDE: 1.12 V
MDC_IDC_MSMT_LEADCHNL_RV_PACING_THRESHOLD_PULSEWIDTH: 0.4 MS
MDC_IDC_PG_IMPLANT_DTM: NORMAL
MDC_IDC_PG_MFG: NORMAL
MDC_IDC_PG_MODEL: NORMAL
MDC_IDC_PG_SERIAL: NORMAL
MDC_IDC_PG_TYPE: NORMAL
MDC_IDC_SESS_CLINIC_NAME: NORMAL
MDC_IDC_SESS_DTM: NORMAL
MDC_IDC_SESS_TYPE: NORMAL
MDC_IDC_SET_BRADY_AT_MODE_SWITCH_MODE: NORMAL
MDC_IDC_SET_BRADY_AT_MODE_SWITCH_RATE: 175 {BEATS}/MIN
MDC_IDC_SET_BRADY_LOWRATE: 50 {BEATS}/MIN
MDC_IDC_SET_BRADY_MAX_SENSOR_RATE: 130 {BEATS}/MIN
MDC_IDC_SET_BRADY_MAX_TRACKING_RATE: 130 {BEATS}/MIN
MDC_IDC_SET_BRADY_MODE: NORMAL
MDC_IDC_SET_BRADY_PAV_DELAY_LOW: 150 MS
MDC_IDC_SET_BRADY_SAV_DELAY_LOW: 120 MS
MDC_IDC_SET_LEADCHNL_RA_PACING_AMPLITUDE: 1.5 V
MDC_IDC_SET_LEADCHNL_RA_PACING_CAPTURE_MODE: NORMAL
MDC_IDC_SET_LEADCHNL_RA_PACING_POLARITY: NORMAL
MDC_IDC_SET_LEADCHNL_RA_PACING_PULSEWIDTH: 0.4 MS
MDC_IDC_SET_LEADCHNL_RA_SENSING_POLARITY: NORMAL
MDC_IDC_SET_LEADCHNL_RA_SENSING_SENSITIVITY: 0.5 MV
MDC_IDC_SET_LEADCHNL_RV_PACING_AMPLITUDE: 2.25 V
MDC_IDC_SET_LEADCHNL_RV_PACING_CAPTURE_MODE: NORMAL
MDC_IDC_SET_LEADCHNL_RV_PACING_POLARITY: NORMAL
MDC_IDC_SET_LEADCHNL_RV_PACING_PULSEWIDTH: 0.4 MS
MDC_IDC_SET_LEADCHNL_RV_SENSING_POLARITY: NORMAL
MDC_IDC_SET_LEADCHNL_RV_SENSING_SENSITIVITY: 2.8 MV
MDC_IDC_SET_ZONE_DETECTION_INTERVAL: 333.33 MS
MDC_IDC_SET_ZONE_DETECTION_INTERVAL: 342.86 MS
MDC_IDC_SET_ZONE_TYPE: NORMAL
MDC_IDC_SET_ZONE_TYPE: NORMAL
MDC_IDC_STAT_AT_BURDEN_PERCENT: 0 %
MDC_IDC_STAT_AT_DTM_END: NORMAL
MDC_IDC_STAT_AT_DTM_START: NORMAL
MDC_IDC_STAT_AT_MODE_SW_COUNT: 48
MDC_IDC_STAT_BRADY_AP_VP_PERCENT: 0 %
MDC_IDC_STAT_BRADY_AP_VS_PERCENT: 3 %
MDC_IDC_STAT_BRADY_AS_VP_PERCENT: 0 %
MDC_IDC_STAT_BRADY_AS_VS_PERCENT: 97 %
MDC_IDC_STAT_BRADY_DTM_END: NORMAL
MDC_IDC_STAT_BRADY_DTM_START: NORMAL
MDC_IDC_STAT_EPISODE_RECENT_COUNT: 1
MDC_IDC_STAT_EPISODE_RECENT_COUNT: 5
MDC_IDC_STAT_EPISODE_RECENT_COUNT_DTM_END: NORMAL
MDC_IDC_STAT_EPISODE_RECENT_COUNT_DTM_END: NORMAL
MDC_IDC_STAT_EPISODE_RECENT_COUNT_DTM_START: NORMAL
MDC_IDC_STAT_EPISODE_RECENT_COUNT_DTM_START: NORMAL
MDC_IDC_STAT_EPISODE_TYPE: NORMAL
MDC_IDC_STAT_EPISODE_TYPE: NORMAL

## 2022-12-20 PROCEDURE — 99214 OFFICE O/P EST MOD 30 MIN: CPT | Performed by: NURSE PRACTITIONER

## 2022-12-20 RX ORDER — WARFARIN SODIUM 5 MG/1
TABLET ORAL
Qty: 150 TABLET | Refills: 3 | Status: SHIPPED | OUTPATIENT
Start: 2022-12-20 | End: 2023-05-30

## 2022-12-20 RX ORDER — ROSUVASTATIN CALCIUM 40 MG/1
40 TABLET, COATED ORAL DAILY
Qty: 90 TABLET | Refills: 3 | Status: SHIPPED | OUTPATIENT
Start: 2022-12-20 | End: 2023-09-12

## 2022-12-20 RX ORDER — HYDROCHLOROTHIAZIDE 12.5 MG/1
TABLET ORAL
Qty: 90 TABLET | Refills: 3 | Status: SHIPPED | OUTPATIENT
Start: 2022-12-20 | End: 2023-04-10

## 2022-12-20 NOTE — TELEPHONE ENCOUNTER
Doris,     You are seeing this pt at 8:15. Just wanted to give you her updated PPM results from her remote check this morning. I will call Jaimie later today to try and get an in clinic device check scheduled 3/2023. Let me know if you have any questions. Thank you, CONSTANTINE Mccann.    Presenting Rhythm      HR Histograms       Atrial Arrhythmia EGM      Ventricular High Rate EGMs          [employer] Adapta (D) Remote PPM Device Check  AP: 3%  : <1%  Mode: AAIR<->DDDR 50/130  Presenting Rhythm: AS/VS rates 66-74bpm  Heart Rate: adequate rates per histograms   Sensing: stable   Pacing Threshold: stable   Impedance: stable   Battery Status: 7 years remaining (4.5-9 years)  Atrial Arrhythmia: 13 mode switch episodes logged comprising <1% of the time. 1 EGM for review show As>Vs for afib lasting 54m12s, average ventricular rate 162bpm. Taking warfarin.  Ventricular Arrhythmia: 2 ventricular high rates logged. EGMs for review show As=Vs for SVT lasting 3-4 seconds, rates 180s.    Care Plan: Annual threshold in clinic f/u q 3 months. OV w/ HERMILA Tom CNP scheduled 12/20/22. CONSTANTINE Mccann    I have reviewed and interpreted the device interrogation, settings, programming and nurse's summary. The device is functioning within normal device parameters. I agree with the current findings, assessment and plan.

## 2022-12-20 NOTE — PROGRESS NOTES
General Cardiology Clinic Progress Note  Tatiana Skelton MRN# [id number]   YOB: 1955 Age: 67 year old     Primary cardiologist: Dr. Lomeli    Reason for visit: Discuss anticoagulation     History of presenting illness:    Tatiana Skelton, a pleasant 67 year old patient who has a past medical history significant for:     1. Paroxysmal atrial fibrillation: CHADS2-VASc score of 3 (age, gender, coronary artery calcifications) and family history of atrial fibrillation in father, brother and son   2. Bradycardia and syncope s/p dual chamber PPM  2002 with generator replacement in 2012.   3. Coronary artery atherosclerosis  4. Hypercholesterolemia  5. Venous insufficiency: Reported previous vein procedures     In June 2021 she underwent a CT coronary calcium score that demonstrated a total score of 609 placing her in the 97 percentile for matched age and gender group.  She underwent a stress echocardiogram in July 2021 that was normal and LVEF was 55 to 60%.       On a pacemaker check from 2/17/2022 revealed 2 episodes of atrial fibrillation first in January 2019 and second in November 2020 lasting 11-1/2 hours and 8-1/2 hours respectively.  There were 2 episodes of nonsustained VT with heart rate between 190 and 240 occurring in December 2019 and most recently on 2/14/2022.  Of note, prior to the January 2021 device check the patient's the device had not been checked since 2018. She also has not had subsequent checks since February 2022 as she states she has difficulty with setting up her home monitor.     She was evaluated in clinic on 2/23/2022 for a discuss regarding starting anticoagulation in the setting of paroxsymal atrial fibrillation noted on device checks. Xarelto was started for CVA prophylaxis.      Her last remote device check was on 9/12/2022 noting 3% atrially paced and less than 1% ventricularly paced.  She had 7 years of battery life remaining with 36 mode switch comprising less  than 1% of the time and 3 ventricular arrhythmias lasting 3 to 4 seconds at rate of in the 180s.    At her last visit on 9/7/2022 the patient stated she recently enrolled in Medicare as of July 1 and unfortunately was unable to utilize the co-pay card.  She had been off Xarelto for approximately 2 months prior to the visit.  During the visit was elected to transition to warfarin and restart Xarelto in January 2023 as it is a new calendar year for insurance deductibles.    A device check from today showed that she is 3% atrially paced, less than 1% ventricularly paced with a battery status of 7 years.  There were 13 mode switches comprising of less than 1% the time with the longest episode of 54 minutes with an average heart rate of 162 bpm.    Today Jaimie states she is doing overall well.  We discussed her device check in detail today and given her history of not tolerating metoprolol due to profound fatigue and low burden of atrial fibrillation although at a rate of 162 bpm we will continue to monitor prefire starting a calcium channel blocker.  Initially, the plan was for her to switch back to a DOAC in January 2023, but she states she is doing well with warfarin and like to continue.  Unfortunately, Jaimie had an emergent root canal last night and is currently on Augmentin with improvement in her symptoms.          Assessment and Plan:     ASSESSMENT:    1. Paroxysmal atrial fibrillation    Noted on device checks with instances correlating with COVID-19 infection in November 2020     Previously did not tolerate metoprolol due to profound fatigue and of AV jade options are needed in the future could consider calcium channel blockers    [id number] score of 3 (age, gender and coronary artery calcifications) currently on warfarin     2. Coronary artery calcifications    CT calcium score showed total score of 609 ( left main 101, LAD 48, left circumflex 0 and RCA 20) placing her in the 97th percentile for age and  gender    FLP (2022): Total cholesterol 175, , HDL 53 and triglycerides 64    Currently on rosuvastatin 40 mg daily and baby aspirin     3. History of syncope and bradycardia    Status post permanent pacemaker placement in  in [place] with generator change in     PLAN:     1. Continue with quarterly device checks  2. Consider adding diltiazem if increased burden of atrial fibrillation occurs with episodes of RVR  3. Follow-up with Dr. Lomeli in 1 year with fasting lipids       Orders this Visit:  Orders Placed This Encounter   Procedures     Lipid Profile     ALT     Follow-Up with Cardiology     Orders Placed This Encounter   Medications     amoxicillin-clavulanate (AUGMENTIN) 875-125 MG tablet     Sig: TAKE 1 TABLET BY MOUTH TWICE A DAY FOR 7 DAYS     warfarin ANTICOAGULANT (JANTOVEN ANTICOAGULANT) 5 MG tablet     Sig: TAKE 10 MG (2 TABS) MON ANDTHURS AND 7.5 MG (1 1/2 TABLETS) ALL OTHER DAYS OR AS DIRECTED BY COUMADIN CLINIC     Dispense:  150 tablet     Refill:  3     hydrochlorothiazide (HYDRODIURIL) 12.5 MG tablet     Si-2 tablets a day for lower extremity swelling     Dispense:  90 tablet     Refill:  3     rosuvastatin (CRESTOR) 40 MG tablet     Sig: Take 1 tablet (40 mg) by mouth daily     Dispense:  90 tablet     Refill:  3     Medications Discontinued During This Encounter   Medication Reason     warfarin ANTICOAGULANT (JANTOVEN ANTICOAGULANT) 5 MG tablet Reorder     hydrochlorothiazide (HYDRODIURIL) 12.5 MG tablet Reorder     rosuvastatin (CRESTOR) 40 MG tablet Reorder       Today's clinic visit entailed:  Review of the result(s) of each unique test - Stress, device check  Ordering of each unique test  Prescription drug management  20 minutes spent on the date of the encounter doing chart review, history and exam, documentation and further activities per the note  Provider  Link to MDM Help Grid     The level of medical decision making during this visit was of moderate  "complexity.           Review of Systems:     Review of Systems:  Skin:        Eyes:  Positive for glasses  ENT:       Respiratory:  Negative shortness of breath;cough;wheezing  Cardiovascular:  Negative;palpitations;chest pain;lightheadedness;dizziness Positive for;edema  Gastroenterology:      Genitourinary:       Musculoskeletal:       Neurologic:  Negative numbness or tingling of hands;numbness or tingling of feet  Psychiatric:       Heme/Lymph/Imm:  Positive for allergies  Endocrine:                 Physical Exam:     Vitals: /74 (BP Location: Right arm, Patient Position: Sitting, Cuff Size: Adult Regular)   Pulse 79   Ht 1.722 m (5' 7.8\")   Wt 80.9 kg (178 lb 4.8 oz)   LMP  (LMP Unknown)   SpO2 94%   BMI 27.27 kg/m    Constitutional: Well nourished and in no apparent distress.  Eyes: Pupils equal, round. Sclerae anicteric.   HEENT: Normocephalic, atraumatic.   Neck: Supple. JVD   Respiratory: Breathing non-labored. Lungs clear to auscultation bilaterally. No crackles, wheezes, rhonchi, or rales.  Cardiovascular:  Regular rate and rhythm, normal S1 and S2. No murmur, rub, or gallop.  Skin: Warm, dry. No rashes, cyanosis, or xanthelasma.  Extremities: No edema.  Neurologic: No gross motor deficits. Alert, awake, and oriented to person, place and time.  Psychiatric: Affect appropriate.             Medications:     Current Outpatient Medications   Medication Sig Dispense Refill     amoxicillin-clavulanate (AUGMENTIN) 875-125 MG tablet TAKE 1 TABLET BY MOUTH TWICE A DAY FOR 7 DAYS       calcium carbonate (OS-SONIA) 500 MG tablet Take 1 tablet by mouth 2 times daily       hydrochlorothiazide (HYDRODIURIL) 12.5 MG tablet 1-2 tablets a day for lower extremity swelling 90 tablet 3     rosuvastatin (CRESTOR) 40 MG tablet Take 1 tablet (40 mg) by mouth daily 90 tablet 3     warfarin ANTICOAGULANT (JANTOVEN ANTICOAGULANT) 5 MG tablet TAKE 10 MG (2 TABS) MON ANDTHURS AND 7.5 MG (1 1/2 TABLETS) ALL OTHER DAYS OR AS " DIRECTED BY COUMADIN CLINIC 150 tablet 3     aspirin (ASA) 81 MG chewable tablet Take 81 mg by mouth daily (Patient not taking: Reported on 12/20/2022)       cholecalciferol 25 MCG (1000 UT) TABS        Cranberry 1000 MG CAPS  (Patient not taking: Reported on 12/20/2022)       mometasone (ELOCON) 0.1 % external ointment Apply sparingly to affected area twice daily as needed.  Do not apply to face. (Patient not taking: Reported on 12/20/2022) 45 g 1       Family History   Problem Relation Age of Onset     Respiratory Mother 61        COPD     Heart Disease Mother      Eye Disorder Mother         cataract surgery     Lipids Mother      Diabetes Mother      Hypertension Mother      Eye Disorder Father      Glaucoma Father 80        takes drops, frequent apt - suspect it is glaucoma     Hypertension Sister      Cancer Sister      Uterine Cancer Sister      Lipids Sister      Lipids Brother      Hypertension Brother      Cerebrovascular Disease No family hx of      Thyroid Disease No family hx of      Macular Degeneration No family hx of        Social History     Socioeconomic History     Marital status: Single     Spouse name: Not on file     Number of children: Not on file     Years of education: Not on file     Highest education level: Not on file   Occupational History     Not on file   Tobacco Use     Smoking status: Never     Smokeless tobacco: Never     Tobacco comments:     Lives in smoke free household   Vaping Use     Vaping Use: Never used   Substance and Sexual Activity     Alcohol use: Yes     Comment: twice a month     Drug use: No     Sexual activity: Yes     Partners: Male     Birth control/protection: Surgical, Female Surgical     Comment: tubal ligation   Other Topics Concern     Parent/sibling w/ CABG, MI or angioplasty before 65F 55M? Not Asked   Social History Narrative     Not on file     Social Determinants of Health     Financial Resource Strain: Not on file   Food Insecurity: Not on file    Transportation Needs: Not on file   Physical Activity: Not on file   Stress: Not on file   Social Connections: Not on file   Intimate Partner Violence: Not on file   Housing Stability: Not on file            Past Medical History:     Past Medical History:   Diagnosis Date     Cervical high risk HPV (human papillomavirus) test positive 10/2/14, 3/6/16     Elevated cholesterol      Generalized osteoarthrosis, unspecified site 1/30/2015     Open angle with borderline findings, low risk 1/9/2014     Sick sinus syndrome (H)     PM implant since 2002     Syncope               Past Surgical History:     Past Surgical History:   Procedure Laterality Date     ABDOMEN SURGERY      Tubal     BUNIONECTOMY JACKIE BILATERAL       COLONOSCOPY N/A 8/20/2018    Procedure: COLONOSCOPY;  COLONOSCOPY;  Surgeon: Manas Lee DO;  Location:  GI     [facility] OR IMPLANT BREAST       IMPLANT IMPLANTABLE CARDIOVERTER DEFIBRILLATOR  6/4/2012    she does not have an ICD. She has a pacemker.     IMPLANT PACEMAKER  2002    Dual chamber medtronic for sick sinus snyndrome     TUBAL LIGATION  2000              Allergies:   Versed and Morphine sulfate       Data:   All laboratory data reviewed:    Recent Labs   Lab Test 05/27/22  1200 02/24/22  0830 12/24/18  0814 12/21/17  0811 12/09/16  0806 04/07/16  0810   LDL 53 44 105*   < > 106* 129*    101 94   < > 113 101   NHDL 66 55 120   < > 115 141*   CHOL 175 156 214*   < > 228* 242*   TRIG 64 55 74   < > 47 58   TSH  --   --   --   --  3.57 2.90    < > = values in this interval not displayed.       Lab Results   Component Value Date    WBC 4.3 01/30/2015    RBC 4.62 01/30/2015    HGB 14.3 01/30/2015    HCT 40.4 01/30/2015    MCV 87 01/30/2015    MCH 31.0 01/30/2015    MCHC 35.4 01/30/2015    RDW 13.6 01/30/2015     01/30/2015       Lab Results   Component Value Date     04/07/2016    POTASSIUM 3.8 04/07/2016    CHLORIDE 106 04/07/2016    CO2 29 04/07/2016    ANIONGAP  9 04/07/2016    GLC 88 12/24/2018    BUN 19 04/07/2016    CR 0.78 04/07/2016    GFRESTIMATED 75 04/07/2016    GFRESTBLACK >90   GFR Calc   04/07/2016    SONIA 9.7 04/07/2016      Lab Results   Component Value Date    AST 15 01/30/2015    ALT 27 05/27/2022    ALT 17 01/30/2015       No results found for: A1C    Lab Results   Component Value Date    INR 2.5 (H) 12/07/2022    INR 2.0 (H) 11/23/2022         HERMILA VILLEGAS [facility]  Pager: [phone]  RN phone: [phone]

## 2022-12-20 NOTE — PATIENT INSTRUCTIONS
TODAY'S RECOMMENDATIONS:    Continue all medications without changes.  Follow up with device clinic every 3 months   Please follow up with Dr. Lomeli in 1 year.    If you have questions or concerns please call clinic at [phone].    Please call [phone] for scheduling.      It was a pleasure seeing you today!

## 2022-12-20 NOTE — LETTER
12/20/2022    Nicole Gray, DANIE  [address] [place] [address]    RE: Tatiana Skelton       Dear Colleague,     I had the pleasure of seeing Tatiana Skelton in the [facility].    General Cardiology Clinic Progress Note  Tatiana Skelton MRN# [id number]   YOB: 1955 Age: 67 year old     Primary cardiologist: Dr. Lomeli    Reason for visit: Discuss anticoagulation     History of presenting illness:    Tatiana Skelton, a pleasant 67 year old patient who has a past medical history significant for:     1. Paroxysmal atrial fibrillation: CHADS2-VASc score of 3 (age, gender, coronary artery calcifications) and family history of atrial fibrillation in father, brother and son   2. Bradycardia and syncope s/p dual chamber PPM  2002 with generator replacement in 2012.   3. Coronary artery atherosclerosis  4. Hypercholesterolemia  5. Venous insufficiency: Reported previous vein procedures     In June 2021 she underwent a CT coronary calcium score that demonstrated a total score of 609 placing her in the 97 percentile for matched age and gender group.  She underwent a stress echocardiogram in July 2021 that was normal and LVEF was 55 to 60%.       On a pacemaker check from 2/17/2022 revealed 2 episodes of atrial fibrillation first in January 2019 and second in November 2020 lasting 11-1/2 hours and 8-1/2 hours respectively.  There were 2 episodes of nonsustained VT with heart rate between 190 and 240 occurring in December 2019 and most recently on 2/14/2022.  Of note, prior to the January 2021 device check the patient's the device had not been checked since 2018. She also has not had subsequent checks since February 2022 as she states she has difficulty with setting up her home monitor.     She was evaluated in clinic on 2/23/2022 for a discuss regarding starting anticoagulation in the setting of paroxsymal atrial fibrillation noted on device checks. Xarelto was started  for CVA prophylaxis.      Her last remote device check was on 9/12/2022 noting 3% atrially paced and less than 1% ventricularly paced.  She had 7 years of battery life remaining with 36 mode switch comprising less than 1% of the time and 3 ventricular arrhythmias lasting 3 to 4 seconds at rate of in the 180s.    At her last visit on 9/7/2022 the patient stated she recently enrolled in Medicare as of July 1 and unfortunately was unable to utilize the co-pay card.  She had been off Xarelto for approximately 2 months prior to the visit.  During the visit was elected to transition to warfarin and restart Xarelto in January 2023 as it is a new calendar year for insurance deductibles.    A device check from today showed that she is 3% atrially paced, less than 1% ventricularly paced with a battery status of 7 years.  There were 13 mode switches comprising of less than 1% the time with the longest episode of 54 minutes with an average heart rate of 162 bpm.    Today Jaimie states she is doing overall well.  We discussed her device check in detail today and given her history of not tolerating metoprolol due to profound fatigue and low burden of atrial fibrillation although at a rate of 162 bpm we will continue to monitor prefire starting a calcium channel blocker.  Initially, the plan was for her to switch back to a DOAC in January 2023, but she states she is doing well with warfarin and like to continue.  Unfortunately, Jaimie had an emergent root canal last night and is currently on Augmentin with improvement in her symptoms.          Assessment and Plan:     ASSESSMENT:    1. Paroxysmal atrial fibrillation    Noted on device checks with instances correlating with COVID-19 infection in November 2020     Previously did not tolerate metoprolol due to profound fatigue and of AV jade options are needed in the future could consider calcium channel blockers    [id number] score of 3 (age, gender and coronary artery  calcifications) currently on warfarin     2. Coronary artery calcifications    CT calcium score showed total score of 609 ( left main 101, LAD 48, left circumflex 0 and RCA 20) placing her in the 97th percentile for age and gender    FLP (2022): Total cholesterol 175, , HDL 53 and triglycerides 64    Currently on rosuvastatin 40 mg daily and baby aspirin     3. History of syncope and bradycardia    Status post permanent pacemaker placement in  in [place] with generator change in     PLAN:     1. Continue with quarterly device checks  2. Consider adding diltiazem if increased burden of atrial fibrillation occurs with episodes of RVR  3. Follow-up with Dr. Lomeli in 1 year with fasting lipids       Orders this Visit:  Orders Placed This Encounter   Procedures     Lipid Profile     ALT     Follow-Up with Cardiology     Orders Placed This Encounter   Medications     amoxicillin-clavulanate (AUGMENTIN) 875-125 MG tablet     Sig: TAKE 1 TABLET BY MOUTH TWICE A DAY FOR 7 DAYS     warfarin ANTICOAGULANT (JANTOVEN ANTICOAGULANT) 5 MG tablet     Sig: TAKE 10 MG (2 TABS) MON ANDTHURS AND 7.5 MG (1 1/2 TABLETS) ALL OTHER DAYS OR AS DIRECTED BY COUMADIN CLINIC     Dispense:  150 tablet     Refill:  3     hydrochlorothiazide (HYDRODIURIL) 12.5 MG tablet     Si-2 tablets a day for lower extremity swelling     Dispense:  90 tablet     Refill:  3     rosuvastatin (CRESTOR) 40 MG tablet     Sig: Take 1 tablet (40 mg) by mouth daily     Dispense:  90 tablet     Refill:  3     Medications Discontinued During This Encounter   Medication Reason     warfarin ANTICOAGULANT (JANTOVEN ANTICOAGULANT) 5 MG tablet Reorder     hydrochlorothiazide (HYDRODIURIL) 12.5 MG tablet Reorder     rosuvastatin (CRESTOR) 40 MG tablet Reorder       Today's clinic visit entailed:  Review of the result(s) of each unique test - Stress, device check  Ordering of each unique test  Prescription drug management  20 minutes spent on the  "date of the encounter doing chart review, history and exam, documentation and further activities per the note  Provider  Link to MDM Help Grid     The level of medical decision making during this visit was of moderate complexity.           Review of Systems:     Review of Systems:  Skin:        Eyes:  Positive for glasses  ENT:       Respiratory:  Negative shortness of breath;cough;wheezing  Cardiovascular:  Negative;palpitations;chest pain;lightheadedness;dizziness Positive for;edema  Gastroenterology:      Genitourinary:       Musculoskeletal:       Neurologic:  Negative numbness or tingling of hands;numbness or tingling of feet  Psychiatric:       Heme/Lymph/Imm:  Positive for allergies  Endocrine:                 Physical Exam:     Vitals: /74 (BP Location: Right arm, Patient Position: Sitting, Cuff Size: Adult Regular)   Pulse 79   Ht 1.722 m (5' 7.8\")   Wt 80.9 kg (178 lb 4.8 oz)   LMP  (LMP Unknown)   SpO2 94%   BMI 27.27 kg/m    Constitutional: Well nourished and in no apparent distress.  Eyes: Pupils equal, round. Sclerae anicteric.   HEENT: Normocephalic, atraumatic.   Neck: Supple. JVD   Respiratory: Breathing non-labored. Lungs clear to auscultation bilaterally. No crackles, wheezes, rhonchi, or rales.  Cardiovascular:  Regular rate and rhythm, normal S1 and S2. No murmur, rub, or gallop.  Skin: Warm, dry. No rashes, cyanosis, or xanthelasma.  Extremities: No edema.  Neurologic: No gross motor deficits. Alert, awake, and oriented to person, place and time.  Psychiatric: Affect appropriate.             Medications:     Current Outpatient Medications   Medication Sig Dispense Refill     amoxicillin-clavulanate (AUGMENTIN) 875-125 MG tablet TAKE 1 TABLET BY MOUTH TWICE A DAY FOR 7 DAYS       calcium carbonate (OS-SONIA) 500 MG tablet Take 1 tablet by mouth 2 times daily       hydrochlorothiazide (HYDRODIURIL) 12.5 MG tablet 1-2 tablets a day for lower extremity swelling 90 tablet 3     " rosuvastatin (CRESTOR) 40 MG tablet Take 1 tablet (40 mg) by mouth daily 90 tablet 3     warfarin ANTICOAGULANT (JANTOVEN ANTICOAGULANT) 5 MG tablet TAKE 10 MG (2 TABS) MON ANDTHURS AND 7.5 MG (1 1/2 TABLETS) ALL OTHER DAYS OR AS DIRECTED BY COUMADIN CLINIC 150 tablet 3     aspirin (ASA) 81 MG chewable tablet Take 81 mg by mouth daily (Patient not taking: Reported on 12/20/2022)       cholecalciferol 25 MCG (1000 UT) TABS        Cranberry 1000 MG CAPS  (Patient not taking: Reported on 12/20/2022)       mometasone (ELOCON) 0.1 % external ointment Apply sparingly to affected area twice daily as needed.  Do not apply to face. (Patient not taking: Reported on 12/20/2022) 45 g 1       Family History   Problem Relation Age of Onset     Respiratory Mother 61        COPD     Heart Disease Mother      Eye Disorder Mother         cataract surgery     Lipids Mother      Diabetes Mother      Hypertension Mother      Eye Disorder Father      Glaucoma Father 80        takes drops, frequent apt - suspect it is glaucoma     Hypertension Sister      Cancer Sister      Uterine Cancer Sister      Lipids Sister      Lipids Brother      Hypertension Brother      Cerebrovascular Disease No family hx of      Thyroid Disease No family hx of      Macular Degeneration No family hx of        Social History     Socioeconomic History     Marital status: Single     Spouse name: Not on file     Number of children: Not on file     Years of education: Not on file     Highest education level: Not on file   Occupational History     Not on file   Tobacco Use     Smoking status: Never     Smokeless tobacco: Never     Tobacco comments:     Lives in smoke free household   Vaping Use     Vaping Use: Never used   Substance and Sexual Activity     Alcohol use: Yes     Comment: twice a month     Drug use: No     Sexual activity: Yes     Partners: Male     Birth control/protection: Surgical, Female Surgical     Comment: tubal ligation   Other Topics Concern      Parent/sibling w/ CABG, MI or angioplasty before 65F 55M? Not Asked   Social History Narrative     Not on file     Social Determinants of Health     Financial Resource Strain: Not on file   Food Insecurity: Not on file   Transportation Needs: Not on file   Physical Activity: Not on file   Stress: Not on file   Social Connections: Not on file   Intimate Partner Violence: Not on file   Housing Stability: Not on file            Past Medical History:     Past Medical History:   Diagnosis Date     Cervical high risk HPV (human papillomavirus) test positive 10/2/14, 3/6/16     Elevated cholesterol      Generalized osteoarthrosis, unspecified site 1/30/2015     Open angle with borderline findings, low risk 1/9/2014     Sick sinus syndrome (H)     PM implant since 2002     Syncope               Past Surgical History:     Past Surgical History:   Procedure Laterality Date     ABDOMEN SURGERY      Tubal     BUNIONECTOMY JACKIE BILATERAL       COLONOSCOPY N/A 8/20/2018    Procedure: COLONOSCOPY;  COLONOSCOPY;  Surgeon: Manas Lee DO;  [facility]: [facility] OR IMPLANT BREAST       IMPLANT IMPLANTABLE CARDIOVERTER DEFIBRILLATOR  6/4/2012    she does not have an ICD. She has a pacemker.     IMPLANT PACEMAKER  2002    Dual chamber medtronic for sick sinus snyndrome     TUBAL LIGATION  2000              Allergies:   Versed and Morphine sulfate       Data:   All laboratory data reviewed:    Recent Labs   Lab Test 05/27/22  1200 02/24/22  0830 12/24/18  0814 12/21/17  0811 12/09/16  0806 04/07/16  0810   LDL 53 44 105*   < > 106* 129*    101 94   < > 113 101   NHDL 66 55 120   < > 115 141*   CHOL 175 156 214*   < > 228* 242*   TRIG 64 55 74   < > 47 58   TSH  --   --   --   --  3.57 2.90    < > = values in this interval not displayed.       Lab Results   Component Value Date    WBC 4.3 01/30/2015    RBC 4.62 01/30/2015    HGB 14.3 01/30/2015    HCT 40.4 01/30/2015    MCV 87 01/30/2015    MCH 31.0  01/30/2015    MCHC 35.4 01/30/2015    RDW 13.6 01/30/2015     01/30/2015       Lab Results   Component Value Date     04/07/2016    POTASSIUM 3.8 04/07/2016    CHLORIDE 106 04/07/2016    CO2 29 04/07/2016    ANIONGAP 9 04/07/2016    GLC 88 12/24/2018    BUN 19 04/07/2016    CR 0.78 04/07/2016    GFRESTIMATED 75 04/07/2016    GFRESTBLACK >90   GFR Calc   04/07/2016    SONIA 9.7 04/07/2016      Lab Results   Component Value Date    AST 15 01/30/2015    ALT 27 05/27/2022    ALT 17 01/30/2015       No results found for: A1C    Lab Results   Component Value Date    INR 2.5 (H) 12/07/2022    INR 2.0 (H) 11/23/2022         HERMILA VILLEGAS CNP  [facility]  Pager: [phone]  RN phone: [phone]      Thank you for allowing me to participate in the care of your patient.      Sincerely,     HERMILA VILLEGAS CNP     [facility]  cc:   HERMILA Lomas CNP  [address]  [place] [address]

## 2022-12-28 ENCOUNTER — TELEPHONE (OUTPATIENT)
Dept: FAMILY MEDICINE | Facility: CLINIC | Age: 67
End: 2022-12-28

## 2022-12-28 NOTE — TELEPHONE ENCOUNTER
Reason for Call:  Other call back    Detailed comments: patient was exposed to covid by her father who she sees daily, so far patient is not experiencing any symptoms but wants to discuss rescheduling appointment for INR.    Phone Number Patient can be reached at: Home number on file [phone] (home)    Best Time: any    Can we leave a detailed message on this number? NO    Call taken on 12/28/2022 at 10:33 AM by Angel Mays

## 2023-01-03 ENCOUNTER — LAB (OUTPATIENT)
Dept: LAB | Facility: CLINIC | Age: 68
End: 2023-01-03
Payer: COMMERCIAL

## 2023-01-03 ENCOUNTER — ANTICOAGULATION THERAPY VISIT (OUTPATIENT)
Dept: ANTICOAGULATION | Facility: CLINIC | Age: 68
End: 2023-01-03

## 2023-01-03 ENCOUNTER — DOCUMENTATION ONLY (OUTPATIENT)
Dept: ANTICOAGULATION | Facility: CLINIC | Age: 68
End: 2023-01-03

## 2023-01-03 DIAGNOSIS — I48.0 PAROXYSMAL ATRIAL FIBRILLATION (H): Primary | ICD-10-CM

## 2023-01-03 DIAGNOSIS — I48.0 PAROXYSMAL ATRIAL FIBRILLATION (H): ICD-10-CM

## 2023-01-03 LAB — INR BLD: 2.1 (ref 0.9–1.1)

## 2023-01-03 PROCEDURE — 36416 COLLJ CAPILLARY BLOOD SPEC: CPT

## 2023-01-03 PROCEDURE — 85610 PROTHROMBIN TIME: CPT

## 2023-01-03 NOTE — PROGRESS NOTES
ANTICOAGULATION CLINIC REFERRAL RENEWAL REQUEST       An annual renewal order is required for all patients referred to [facility].?  Please review and sign the pended referral order for Tatiana Skelton.     Updated: patient was originally planning to switch back to a DOAC 2023, however has decided to remain on warfarin. Current referral  23.    ANTICOAGULATION SUMMARY      Warfarin indication(s)   Atrial Fibrillation    Mechanical heart valve present?  NO       Current goal range   INR: 2.0-3.0     Goal appropriate for indication? Goal INR 2-3, standard for indication(s) above     Time in Therapeutic Range (TTR)  (Goal > 60%) 69.9%       Office visit with referring provider's group within last year yes on 2022       Ashley Garcia RN  [facility]

## 2023-01-03 NOTE — PROGRESS NOTES
ANTICOAGULATION MANAGEMENT     Tatiana Skelton 67 year old female is on warfarin with therapeutic INR result. (Goal INR 2.0-3.0)    Recent labs: (last 7 days)     01/03/23  0819   INR 2.1*       ASSESSMENT       Source(s): Chart Review and Patient/Caregiver Call       Warfarin doses taken: Warfarin taken as instructed    Diet: No new diet changes identified    New illness, injury, or hospitalization: No    Medication/supplement changes: Patient was on Augmentin after a root canal, has finished that    Signs or symptoms of bleeding or clotting: No    Previous INR: Therapeutic last 2(+) visits    Additional findings: Patient saw Cardiology, plans to remain on warfarin instead of switching back to DOAC. Will send updated referral to PCP       PLAN     Recommended plan for no diet, medication or health factor changes affecting INR     Dosing Instructions: Continue your current warfarin dose with next INR in 5 weeks       Summary  As of 1/3/2023    Full warfarin instructions:  10 mg every Mon, Thu; 7.5 mg all other days   Next INR check:  2/7/2023             Telephone call with Jaimie who verbalizes understanding and agrees to plan    Lab visit scheduled    Education provided:     Contact [phone]  with any changes, questions or concerns.     Plan made per [facility] anticoagulation protocol    Ashley Garcia, RN  Anticoagulation Clinic  1/3/2023    _______________________________________________________________________     Anticoagulation Episode Summary     Current INR goal:  2.0-3.0   TTR:  69.9 % (3.5 mo)   Target end date:  1/1/2023   Send INR reminders to:  LOU MEJIA    Indications    Paroxysmal atrial fibrillation (H) [I48.0]           Comments:           Anticoagulation Care Providers     Provider Role Specialty Phone number    Doris Fraser, APRN CNP Referring Cardiovascular Disease [phone]

## 2023-02-08 NOTE — PROGRESS NOTES
Patient called to report that she missed her warfarin dose on 2/6.  Jaimie will have an INR check on 2/8.  Calendar is updated.

## 2023-02-09 ENCOUNTER — LAB (OUTPATIENT)
Dept: LAB | Facility: CLINIC | Age: 68
End: 2023-02-09
Payer: COMMERCIAL

## 2023-02-09 ENCOUNTER — ANTICOAGULATION THERAPY VISIT (OUTPATIENT)
Dept: ANTICOAGULATION | Facility: CLINIC | Age: 68
End: 2023-02-09

## 2023-02-09 DIAGNOSIS — I48.0 PAROXYSMAL ATRIAL FIBRILLATION (H): ICD-10-CM

## 2023-02-09 DIAGNOSIS — I48.0 PAROXYSMAL ATRIAL FIBRILLATION (H): Primary | ICD-10-CM

## 2023-02-09 LAB — INR BLD: 1.3 (ref 0.9–1.1)

## 2023-02-09 PROCEDURE — 36416 COLLJ CAPILLARY BLOOD SPEC: CPT

## 2023-02-09 PROCEDURE — 85610 PROTHROMBIN TIME: CPT

## 2023-02-09 NOTE — PROGRESS NOTES
ANTICOAGULATION MANAGEMENT     Tatiana Skelton 67 year old female is on warfarin with subtherapeutic INR result. (Goal INR 2.0-3.0)    Recent labs: (last 7 days)     02/09/23  1039   INR 1.3*       ASSESSMENT       Source(s): Chart Review and Patient/Caregiver Call       Warfarin doses taken: Missed dose(s) may be affecting INR    Diet: No new diet changes identified    New illness, injury, or hospitalization: No    Medication/supplement changes: None noted    Signs or symptoms of bleeding or clotting: No    Previous INR: Therapeutic last 2(+) visits    Additional findings: None       PLAN     Recommended plan for temporary change(s) affecting INR     Dosing Instructions: booster dose then continue your current warfarin dose with next INR in 8 days       Summary  As of 2/9/2023    Full warfarin instructions:  2/9: 15 mg; Otherwise 10 mg every Mon, Thu; 7.5 mg all other days   Next INR check:  2/17/2023             Telephone call with Jaimie who verbalizes understanding and agrees to plan and who agrees to plan and repeated back plan correctly    Lab visit scheduled    Education provided:     Please call back if any changes to your diet, medications or how you've been taking warfarin    Plan made per [facility] anticoagulation protocol    Lois Carpio RN  Anticoagulation Clinic  2/9/2023    _______________________________________________________________________     Anticoagulation Episode Summary     Current INR goal:  2.0-3.0   TTR:  55.0 % (4.8 mo)   Target end date:  Indefinite   Send INR reminders to:  [facility]    Indications    Paroxysmal atrial fibrillation (H) [I48.0]           Comments:           Anticoagulation Care Providers     Provider Role Specialty Phone number    Doris Fraser, APRN CNP Referring Cardiovascular Disease [phone]    Nicole Gray NP Referring Nurse Practitioner - Family [phone]

## 2023-02-17 ENCOUNTER — LAB (OUTPATIENT)
Dept: LAB | Facility: CLINIC | Age: 68
End: 2023-02-17
Payer: COMMERCIAL

## 2023-02-17 ENCOUNTER — ANTICOAGULATION THERAPY VISIT (OUTPATIENT)
Dept: ANTICOAGULATION | Facility: CLINIC | Age: 68
End: 2023-02-17

## 2023-02-17 DIAGNOSIS — I48.0 PAROXYSMAL ATRIAL FIBRILLATION (H): Primary | ICD-10-CM

## 2023-02-17 DIAGNOSIS — I48.0 PAROXYSMAL ATRIAL FIBRILLATION (H): ICD-10-CM

## 2023-02-17 LAB — INR BLD: 1.9 (ref 0.9–1.1)

## 2023-02-17 PROCEDURE — 36416 COLLJ CAPILLARY BLOOD SPEC: CPT

## 2023-02-17 PROCEDURE — 85610 PROTHROMBIN TIME: CPT

## 2023-02-17 NOTE — PROGRESS NOTES
ANTICOAGULATION MANAGEMENT     Tatiana Skelton 67 year old female is on warfarin with subtherapeutic INR result. (Goal INR 2.0-3.0)    Recent labs: (last 7 days)     02/17/23  0823   INR 1.9*       ASSESSMENT       Source(s): Chart Review       Warfarin doses taken: Reviewed in chart    Diet: LM    New illness, injury, or hospitalization: No    Medication/supplement changes: None noted    Signs or symptoms of bleeding or clotting: No    Previous INR: Subtherapeutic    Additional findings: None       PLAN     Recommended plan for no diet, medication or health factor changes affecting INR     Dosing Instructions: Increase your warfarin dose (4.3% change) with next INR in 1 week       Summary  As of 2/17/2023    Full warfarin instructions:  10 mg every Mon, Thu, Sat; 7.5 mg all other days   Next INR check:               Detailed voice message left for Jaimie with dosing instructions and follow up date.     Contact [phone]  to schedule and with any changes, questions or concerns.     Education provided:     Please call back if any changes to your diet, medications or how you've been taking warfarin    Plan made per [facility] anticoagulation protocol    Jacque Koehler, RN  Anticoagulation Clinic  2/17/2023    _______________________________________________________________________     Anticoagulation Episode Summary     Current INR goal:  2.0-3.0   TTR:  52.1 % (5 mo)   Target end date:  Indefinite   Send INR reminders to:  LOU MEJIA    Indications    Paroxysmal atrial fibrillation (H) [I48.0]           Comments:           Anticoagulation Care Providers     Provider Role Specialty Phone number    Doris Fraser, APRN CNP Referring Cardiovascular Disease [phone]    Nicole Gray NP Referring Nurse Practitioner - Family [phone]

## 2023-02-28 ENCOUNTER — ANTICOAGULATION THERAPY VISIT (OUTPATIENT)
Dept: ANTICOAGULATION | Facility: CLINIC | Age: 68
End: 2023-02-28

## 2023-02-28 ENCOUNTER — LAB (OUTPATIENT)
Dept: LAB | Facility: CLINIC | Age: 68
End: 2023-02-28
Payer: COMMERCIAL

## 2023-02-28 DIAGNOSIS — I48.0 PAROXYSMAL ATRIAL FIBRILLATION (H): Primary | ICD-10-CM

## 2023-02-28 DIAGNOSIS — I48.0 PAROXYSMAL ATRIAL FIBRILLATION (H): ICD-10-CM

## 2023-02-28 LAB — INR BLD: 2.8 (ref 0.9–1.1)

## 2023-02-28 PROCEDURE — 36416 COLLJ CAPILLARY BLOOD SPEC: CPT

## 2023-02-28 PROCEDURE — 85610 PROTHROMBIN TIME: CPT

## 2023-02-28 NOTE — PROGRESS NOTES
ANTICOAGULATION MANAGEMENT     Tatiana Skelton 67 year old female is on warfarin with therapeutic INR result. (Goal INR 2.0-3.0)    Recent labs: (last 7 days)     02/28/23  0837   INR 2.8*       ASSESSMENT       Source(s): Chart Review and Patient/Caregiver Call       Warfarin doses taken: Warfarin taken as instructed    Diet: No new diet changes identified    New illness, injury, or hospitalization: No    Medication/supplement changes: None noted    Signs or symptoms of bleeding or clotting: No    Previous INR: Subtherapeutic    Additional findings: None         PLAN     Recommended plan for no diet, medication or health factor changes affecting INR     Dosing Instructions: Continue your current warfarin dose with next INR in 2 weeks       Summary  As of 2/28/2023    Full warfarin instructions:  10 mg every Mon, Thu, Sat; 7.5 mg all other days   Next INR check:  3/14/2023             Telephone call with Jaimie who verbalizes understanding and agrees to plan and who agrees to plan and repeated back plan correctly    Lab visit scheduled    Education provided:     Dietary considerations: importance of consistent vitamin K intake and impact of vitamin K foods on INR    Plan made per [facility] anticoagulation protocol    Jacque Koehler, RN  Anticoagulation Clinic  2/28/2023    _______________________________________________________________________     Anticoagulation Episode Summary     Current INR goal:  2.0-3.0   TTR:  54.6 % (5.4 mo)   Target end date:  Indefinite   Send INR reminders to:  LOU MEJIA    Indications    Paroxysmal atrial fibrillation (H) [I48.0]           Comments:           Anticoagulation Care Providers     Provider Role Specialty Phone number    Doris Fraser, APRN CNP Referring Cardiovascular Disease [phone]    Nicole Gray NP Referring Nurse Practitioner - Family [phone]

## 2023-03-14 ENCOUNTER — LAB (OUTPATIENT)
Dept: LAB | Facility: CLINIC | Age: 68
End: 2023-03-14
Payer: COMMERCIAL

## 2023-03-14 ENCOUNTER — ANTICOAGULATION THERAPY VISIT (OUTPATIENT)
Dept: ANTICOAGULATION | Facility: CLINIC | Age: 68
End: 2023-03-14

## 2023-03-14 DIAGNOSIS — I48.0 PAROXYSMAL ATRIAL FIBRILLATION (H): Primary | ICD-10-CM

## 2023-03-14 DIAGNOSIS — I48.0 PAROXYSMAL ATRIAL FIBRILLATION (H): ICD-10-CM

## 2023-03-14 LAB — INR BLD: 3.3 (ref 0.9–1.1)

## 2023-03-14 PROCEDURE — 85610 PROTHROMBIN TIME: CPT

## 2023-03-14 PROCEDURE — 36416 COLLJ CAPILLARY BLOOD SPEC: CPT

## 2023-03-14 NOTE — PROGRESS NOTES
ANTICOAGULATION MANAGEMENT     Tatiana Skelton 67 year old female is on warfarin with supratherapeutic INR result. (Goal INR 2.0-3.0)    Recent labs: (last 7 days)     03/14/23  0844   INR 3.3*       ASSESSMENT       Source(s): Chart Review and Patient/Caregiver Call       Warfarin doses taken: Warfarin taken as instructed    Diet: Decreased greens/vitamin K in diet; ongoing change    New illness, injury, or hospitalization: No    Medication/supplement changes: None noted    Signs or symptoms of bleeding or clotting: No    Previous INR: Therapeutic last visit; previously outside of goal range    Additional findings: her maitenance dose was increased and now she is also eating less greens          PLAN     Recommended plan for ongoing change(s) affecting INR     Dosing Instructions: decrease your warfarin dose (4% change) with next INR in 2 weeks       Summary  As of 3/14/2023    Full warfarin instructions:  10 mg every Mon, Thu; 7.5 mg all other days   Next INR check:  3/28/2023             Telephone call with Jaimie who verbalizes understanding and agrees to plan    Lab visit scheduled    Education provided:     Goal range and lab monitoring: goal range and significance of current result    Dietary considerations: importance of consistent vitamin K intake    Contact [phone]  with any changes, questions or concerns.     Plan made per [facility] anticoagulation protocol    Paty Harris RN  Anticoagulation Clinic  3/14/2023    _______________________________________________________________________     Anticoagulation Episode Summary     Current INR goal:  2.0-3.0   TTR:  53.4 % (5.9 mo)   Target end date:  Indefinite   Send INR reminders to:  LOU MEJIA    Indications    Paroxysmal atrial fibrillation (H) [I48.0]           Comments:           Anticoagulation Care Providers     Provider Role Specialty Phone number    Doris Fraser APRN CNP Referring Cardiovascular Disease [phone]    Nicole Gray  DANIE Pugh Referring Nurse Practitioner - Family [phone]

## 2023-03-23 ENCOUNTER — ANCILLARY PROCEDURE (OUTPATIENT)
Dept: CARDIOLOGY | Facility: CLINIC | Age: 68
End: 2023-03-23
Attending: INTERNAL MEDICINE
Payer: COMMERCIAL

## 2023-03-23 DIAGNOSIS — I49.5 SINUS NODE DYSFUNCTION (H): ICD-10-CM

## 2023-03-23 DIAGNOSIS — Z95.0 CARDIAC PACEMAKER IN SITU: ICD-10-CM

## 2023-03-23 PROCEDURE — 93280 PM DEVICE PROGR EVAL DUAL: CPT | Performed by: INTERNAL MEDICINE

## 2023-03-24 ENCOUNTER — TELEPHONE (OUTPATIENT)
Dept: CARDIOLOGY | Facility: CLINIC | Age: 68
End: 2023-03-24
Payer: COMMERCIAL

## 2023-03-24 DIAGNOSIS — R60.9 EDEMA: Primary | ICD-10-CM

## 2023-03-24 LAB
MDC_IDC_LEAD_IMPLANT_DT: NORMAL
MDC_IDC_LEAD_IMPLANT_DT: NORMAL
MDC_IDC_LEAD_LOCATION: NORMAL
MDC_IDC_LEAD_LOCATION: NORMAL
MDC_IDC_LEAD_LOCATION_DETAIL_1: NORMAL
MDC_IDC_LEAD_LOCATION_DETAIL_1: NORMAL
MDC_IDC_LEAD_MFG: NORMAL
MDC_IDC_LEAD_MFG: NORMAL
MDC_IDC_LEAD_MODEL: NORMAL
MDC_IDC_LEAD_MODEL: NORMAL
MDC_IDC_LEAD_POLARITY_TYPE: NORMAL
MDC_IDC_LEAD_POLARITY_TYPE: NORMAL
MDC_IDC_LEAD_SERIAL: NORMAL
MDC_IDC_LEAD_SERIAL: NORMAL
MDC_IDC_LEAD_SPECIAL_FUNCTION: NORMAL
MDC_IDC_MSMT_BATTERY_DTM: NORMAL
MDC_IDC_MSMT_BATTERY_IMPEDANCE: 911 OHM
MDC_IDC_MSMT_BATTERY_REMAINING_LONGEVITY: 83 MO
MDC_IDC_MSMT_BATTERY_STATUS: NORMAL
MDC_IDC_MSMT_BATTERY_VOLTAGE: 2.78 V
MDC_IDC_MSMT_LEADCHNL_RA_IMPEDANCE_VALUE: 493 OHM
MDC_IDC_MSMT_LEADCHNL_RA_PACING_THRESHOLD_AMPLITUDE: 0.75 V
MDC_IDC_MSMT_LEADCHNL_RA_PACING_THRESHOLD_AMPLITUDE: 0.88 V
MDC_IDC_MSMT_LEADCHNL_RA_PACING_THRESHOLD_PULSEWIDTH: 0.4 MS
MDC_IDC_MSMT_LEADCHNL_RA_PACING_THRESHOLD_PULSEWIDTH: 0.4 MS
MDC_IDC_MSMT_LEADCHNL_RA_SENSING_INTR_AMPL: 4 MV
MDC_IDC_MSMT_LEADCHNL_RV_IMPEDANCE_VALUE: 690 OHM
MDC_IDC_MSMT_LEADCHNL_RV_PACING_THRESHOLD_AMPLITUDE: 0.75 V
MDC_IDC_MSMT_LEADCHNL_RV_PACING_THRESHOLD_AMPLITUDE: 1 V
MDC_IDC_MSMT_LEADCHNL_RV_PACING_THRESHOLD_PULSEWIDTH: 0.4 MS
MDC_IDC_MSMT_LEADCHNL_RV_PACING_THRESHOLD_PULSEWIDTH: 0.4 MS
MDC_IDC_MSMT_LEADCHNL_RV_SENSING_INTR_AMPL: 8 MV
MDC_IDC_PG_IMPLANT_DTM: NORMAL
MDC_IDC_PG_MFG: NORMAL
MDC_IDC_PG_MODEL: NORMAL
MDC_IDC_PG_SERIAL: NORMAL
MDC_IDC_PG_TYPE: NORMAL
MDC_IDC_SESS_CLINIC_NAME: NORMAL
MDC_IDC_SESS_DTM: NORMAL
MDC_IDC_SESS_TYPE: NORMAL
MDC_IDC_SET_BRADY_AT_MODE_SWITCH_MODE: NORMAL
MDC_IDC_SET_BRADY_AT_MODE_SWITCH_RATE: 175 {BEATS}/MIN
MDC_IDC_SET_BRADY_LOWRATE: 50 {BEATS}/MIN
MDC_IDC_SET_BRADY_MAX_SENSOR_RATE: 130 {BEATS}/MIN
MDC_IDC_SET_BRADY_MAX_TRACKING_RATE: 130 {BEATS}/MIN
MDC_IDC_SET_BRADY_MODE: NORMAL
MDC_IDC_SET_BRADY_PAV_DELAY_LOW: 150 MS
MDC_IDC_SET_BRADY_SAV_DELAY_LOW: 120 MS
MDC_IDC_SET_LEADCHNL_RA_PACING_AMPLITUDE: 1.5 V
MDC_IDC_SET_LEADCHNL_RA_PACING_CAPTURE_MODE: NORMAL
MDC_IDC_SET_LEADCHNL_RA_PACING_POLARITY: NORMAL
MDC_IDC_SET_LEADCHNL_RA_PACING_PULSEWIDTH: 0.4 MS
MDC_IDC_SET_LEADCHNL_RA_SENSING_POLARITY: NORMAL
MDC_IDC_SET_LEADCHNL_RA_SENSING_SENSITIVITY: 0.5 MV
MDC_IDC_SET_LEADCHNL_RV_PACING_AMPLITUDE: 2 V
MDC_IDC_SET_LEADCHNL_RV_PACING_CAPTURE_MODE: NORMAL
MDC_IDC_SET_LEADCHNL_RV_PACING_POLARITY: NORMAL
MDC_IDC_SET_LEADCHNL_RV_PACING_PULSEWIDTH: 0.4 MS
MDC_IDC_SET_LEADCHNL_RV_SENSING_POLARITY: NORMAL
MDC_IDC_SET_LEADCHNL_RV_SENSING_SENSITIVITY: 4 MV
MDC_IDC_SET_ZONE_DETECTION_INTERVAL: 333.33 MS
MDC_IDC_SET_ZONE_DETECTION_INTERVAL: 342.86 MS
MDC_IDC_SET_ZONE_TYPE: NORMAL
MDC_IDC_SET_ZONE_TYPE: NORMAL
MDC_IDC_STAT_AT_BURDEN_PERCENT: 0 %
MDC_IDC_STAT_AT_DTM_END: NORMAL
MDC_IDC_STAT_AT_DTM_START: NORMAL
MDC_IDC_STAT_AT_MODE_SW_COUNT: 59
MDC_IDC_STAT_BRADY_AP_VP_PERCENT: 0 %
MDC_IDC_STAT_BRADY_AP_VS_PERCENT: 3 %
MDC_IDC_STAT_BRADY_AS_VP_PERCENT: 0 %
MDC_IDC_STAT_BRADY_AS_VS_PERCENT: 97 %
MDC_IDC_STAT_BRADY_DTM_END: NORMAL
MDC_IDC_STAT_BRADY_DTM_START: NORMAL
MDC_IDC_STAT_EPISODE_RECENT_COUNT: 1
MDC_IDC_STAT_EPISODE_RECENT_COUNT: 5
MDC_IDC_STAT_EPISODE_RECENT_COUNT_DTM_END: NORMAL
MDC_IDC_STAT_EPISODE_RECENT_COUNT_DTM_END: NORMAL
MDC_IDC_STAT_EPISODE_RECENT_COUNT_DTM_START: NORMAL
MDC_IDC_STAT_EPISODE_RECENT_COUNT_DTM_START: NORMAL
MDC_IDC_STAT_EPISODE_TYPE: NORMAL
MDC_IDC_STAT_EPISODE_TYPE: NORMAL

## 2023-03-24 NOTE — TELEPHONE ENCOUNTER
Pt was seen in device clinic yesterday for routine PPM check. She requested a message be sent to Doris HELTON.     Pt reports her ankles are swollen up to her knees. Pt has a friend who also had ankle edema as well as SOB with activity, and her friend had a series of tests that ultimately resulted in several stents being placed. Because of this, pt is concerns and wants to know if she needs any testing.     Pt denies shortness of breath with activity. She had a stress echo in 2021 that was normal. Pt reports she had a calcium score done at [facility] that was elevated, but she did not have exact information.     I discussed with pt that since she has no other symptoms, I am not too concerned that the edema indicates a cardiac problem. It could be something else, such as a venous issue. Will review with Doris HELTON per pt request to see if pt needs any testing.

## 2023-03-24 NOTE — TELEPHONE ENCOUNTER
Please have her start Lasix 20 mg daily and follow up with PCP or ROBERT for further discuss. Please obtain a BMP in 1-2 weeks.    HERMILA Armstrong, CNP

## 2023-03-28 ENCOUNTER — ANTICOAGULATION THERAPY VISIT (OUTPATIENT)
Dept: LAB | Facility: CLINIC | Age: 68
End: 2023-03-28

## 2023-03-28 ENCOUNTER — LAB (OUTPATIENT)
Dept: LAB | Facility: CLINIC | Age: 68
End: 2023-03-28
Payer: COMMERCIAL

## 2023-03-28 DIAGNOSIS — I48.0 PAROXYSMAL ATRIAL FIBRILLATION (H): ICD-10-CM

## 2023-03-28 DIAGNOSIS — I48.0 PAROXYSMAL ATRIAL FIBRILLATION (H): Primary | ICD-10-CM

## 2023-03-28 LAB — INR BLD: 2.2 (ref 0.9–1.1)

## 2023-03-28 PROCEDURE — 36416 COLLJ CAPILLARY BLOOD SPEC: CPT

## 2023-03-28 PROCEDURE — 85610 PROTHROMBIN TIME: CPT

## 2023-03-28 RX ORDER — FUROSEMIDE 20 MG
20 TABLET ORAL DAILY
Qty: 90 TABLET | Refills: 3 | Status: SHIPPED | OUTPATIENT
Start: 2023-03-28 | End: 2023-04-25

## 2023-03-28 NOTE — TELEPHONE ENCOUNTER
Called pt and gave her instructions per Doris NP's note below. Pt states understanding. She wants her f/u with Doris NP, and not her PMD. Sent prescription for lasix to pharmacy of choice, and entered orders for BMP and f/u with Doris NP. Sent message to scheduling to call pt and set up BMP and OV.

## 2023-03-28 NOTE — PROGRESS NOTES
ANTICOAGULATION MANAGEMENT ANTICOAGULATION MANAGEMENT     Tatiana Skelton 67 year old female is on warfarin with therapeutic INR result. (Goal INR 2.0-3.0)    Recent labs: (last 7 days)     03/28/23  0856   INR 2.2*       ASSESSMENT       Source(s): Chart Review and Patient/Caregiver Call       Warfarin doses taken: Warfarin taken as instructed    Diet: No new diet changes identified    New illness, injury, or hospitalization: No    Medication/supplement changes: None noted    Signs or symptoms of bleeding or clotting: No    Previous INR: Supratherapeutic    Additional findings: None         PLAN     Recommended plan for no diet, medication or health factor changes affecting INR     Dosing Instructions: Continue your current warfarin dose with next INR in 4 weeks with Cardiology appointment.       Summary  As of 3/28/2023    Full warfarin instructions:  10 mg every Mon, Thu; 7.5 mg all other days   Next INR check:  4/25/2023             Telephone call with Jaimie who verbalizes understanding and agrees to plan    Lab visit scheduled    Education provided:     Please call back if any changes to your diet, medications or how you've been taking warfarin    Contact [phone]  with any changes, questions or concerns.     Plan made per [facility] anticoagulation protocol    Jazmin HERNANDEZ RN  Anticoagulation Clinic  3/28/2023    _______________________________________________________________________     Anticoagulation Episode Summary     Current INR goal:  2.0-3.0   TTR:  54.9 % (6.3 mo)   Target end date:  Indefinite   Send INR reminders to:  LOU MEJIA    Indications    Paroxysmal atrial fibrillation (H) [I48.0]           Comments:           Anticoagulation Care Providers     Provider Role Specialty Phone number    Doris Fraser APRN CNP Referring Cardiovascular Disease [phone]    Nicole Gray NP Referring Nurse Practitioner - Family [phone]            Tatiana Skelton 67 year old female is on  warfarin with therapeutic INR result. (Goal INR 2.0-3.0)    Recent labs: (last 7 days)     03/28/23  0856   INR 2.2*       ASSESSMENT       Source(s): Chart Review    Previous INR was Supratherapeutic    Medication, diet, health changes since last INR : Ankle edema. Started on Lasix 3/28/23. No interaction anticipated.             PLAN     Unable to reach Jaimie today.    No instructions provided. Unable to leave voicemail.    Follow up required to confirm warfarin dose taken and assess for changes    Jazmin HERNANDEZ RN  Anticoagulation Clinic  3/28/2023

## 2023-04-10 ENCOUNTER — OFFICE VISIT (OUTPATIENT)
Dept: SURGERY | Facility: CLINIC | Age: 68
End: 2023-04-10
Payer: COMMERCIAL

## 2023-04-10 VITALS
WEIGHT: 180 LBS | BODY MASS INDEX: 27.28 KG/M2 | TEMPERATURE: 97.3 F | SYSTOLIC BLOOD PRESSURE: 118 MMHG | DIASTOLIC BLOOD PRESSURE: 78 MMHG | HEIGHT: 68 IN

## 2023-04-10 DIAGNOSIS — R60.0 BILATERAL LEG EDEMA: Primary | ICD-10-CM

## 2023-04-10 DIAGNOSIS — I78.1 SPIDER VEINS: ICD-10-CM

## 2023-04-10 PROCEDURE — 99203 OFFICE O/P NEW LOW 30 MIN: CPT | Performed by: SPECIALIST

## 2023-04-10 ASSESSMENT — PAIN SCALES - GENERAL: PAINLEVEL: MILD PAIN (3)

## 2023-04-10 NOTE — LETTER
4/10/2023         RE: Tatiana Skelton  [address] [address] [place] [id number]        Dear Colleague,    Thank you for referring your patient, Tatiana Skelton, to the [facility]. Please see a copy of my visit note below.    Consult requested by Nicole Gray    Reason for consultation: Bilateral leg edema    HPI:  Patient is a 67-year-old lady with a many year history of bilateral lower extremity swelling with associated pain.  She feels her right is worse than her left.  She also reports pain early on in the day.  She does have a history of A-fib for which she is on Coumadin.  She also has a pacemaker in place.  She has a history of what sounds like sclerotherapy in 2019 for the swelling.  She denies any leg trauma, DVT, superficial phlebitis or nonhealing wounds.  She reports the swelling does not fully resolve with elevation.  She has not worn compression recently.  She saw her chiropractor who recommended she have her legs evaluated.  There is no family history of varicose veins.    Past Medical History:   Diagnosis Date     Cervical high risk HPV (human papillomavirus) test positive 10/2/14, 3/6/16     Elevated cholesterol      Generalized osteoarthrosis, unspecified site 1/30/2015     Open angle with borderline findings, low risk 1/9/2014     Sick sinus syndrome (H)     PM implant since 2002     Syncope      Past Surgical History:   Procedure Laterality Date     ABDOMEN SURGERY      Tubal     BUNIONECTOMY JACKIE BILATERAL       COLONOSCOPY N/A 8/20/2018    Procedure: COLONOSCOPY;  COLONOSCOPY;  Surgeon: Manas Lee DO;  Location:  GI     [facility] OR IMPLANT BREAST       IMPLANT IMPLANTABLE CARDIOVERTER DEFIBRILLATOR  6/4/2012    she does not have an ICD. She has a pacemker.     IMPLANT PACEMAKER  2002    Dual chamber medtronic for sick sinus snyndrome     TUBAL LIGATION  2000     Current Outpatient Medications   Medication     calcium carbonate (OS-SONIA) 500 MG tablet      cholecalciferol 25 MCG (1000 UT) TABS     furosemide (LASIX) 20 MG tablet     mometasone (ELOCON) 0.1 % external ointment     rosuvastatin (CRESTOR) 40 MG tablet     warfarin ANTICOAGULANT (JANTOVEN ANTICOAGULANT) 5 MG tablet     No current facility-administered medications for this visit.        Allergies   Allergen Reactions     Versed      Hypersensitive     Morphine Sulfate Anxiety     Social History     Socioeconomic History     Marital status: Single     Spouse name: Not on file     Number of children: Not on file     Years of education: Not on file     Highest education level: Not on file   Occupational History     Not on file   Tobacco Use     Smoking status: Never     Smokeless tobacco: Never     Tobacco comments:     Lives in smoke free household   Vaping Use     Vaping status: Never Used     Passive vaping exposure: Yes   Substance and Sexual Activity     Alcohol use: Yes     Comment: twice a month     Drug use: No     Sexual activity: Yes     Partners: Male     Birth control/protection: Surgical, Female Surgical     Comment: tubal ligation   Other Topics Concern     Parent/sibling w/ CABG, MI or angioplasty before 65F 55M? Not Asked   Social History Narrative     Not on file     Social Determinants of Health     Financial Resource Strain: Not on file   Food Insecurity: Not on file   Transportation Needs: Not on file   Physical Activity: Not on file   Stress: Not on file   Social Connections: Not on file   Intimate Partner Violence: Not on file   Housing Stability: Not on file     Family History   Problem Relation Age of Onset     Respiratory Mother 61        COPD     Heart Disease Mother      Eye Disorder Mother         cataract surgery     Lipids Mother      Diabetes Mother      Hypertension Mother      Eye Disorder Father      Glaucoma Father 80        takes drops, frequent apt - suspect it is glaucoma     Hypertension Sister      Cancer Sister      Uterine Cancer Sister      Lipids Sister      Lipids  Brother      Hypertension Brother      Cerebrovascular Disease No family hx of      Thyroid Disease No family hx of      Macular Degeneration No family hx of       ROS: 10 point ROS neg other than the symptoms noted above in the HPI.    PE:  B/P: 118/78, T: 97.3, P: Data Unavailable, R: Data Unavailable  General: well developed, well nourished WF who appears their stated age  HEENT: NC/AT, EOMI, (-)icterus, (-)injection  Neck: Supple, No JVD  Chest: CTA  Heart: S1, S2, (-)m/r/g  Abd: Soft, non tender, non distended, non tender, no masses  Ext; Warm, (+_DP/PT pulses bilat.  +1 edema bilaterally with right greater than left.  Scattered spider veins, no obvious varicosities.  Psych: AAOx3  Neuro: No focal deficits      Impression/plan:  This is a 67-year-old with bilateral leg edema unclear etiology.  She is a CEAP 3 bilaterally.  Right worse than left.  I suspect this may represent lymphedema.  She has no signs of arterial disease as she has palpable pulses.  I discussed the etiology of leg edema and she expressed understanding. After discussion with the patient the plan at this time is obtain an ultrasound of both lower extremities and proceed based on his findings.  She will follow with me after the ultrasound.    Bruce Schwab MD, FACS      Again, thank you for allowing me to participate in the care of your patient.        Sincerely,        Bruce Schwab MD

## 2023-04-10 NOTE — PROGRESS NOTES
Consult requested by Nicole Gray    Reason for consultation: Bilateral leg edema    HPI:  Patient is a 67-year-old lady with a many year history of bilateral lower extremity swelling with associated pain.  She feels her right is worse than her left.  She also reports pain early on in the day.  She does have a history of A-fib for which she is on Coumadin.  She also has a pacemaker in place.  She has a history of what sounds like sclerotherapy in 2019 for the swelling.  She denies any leg trauma, DVT, superficial phlebitis or nonhealing wounds.  She reports the swelling does not fully resolve with elevation.  She has not worn compression recently.  She saw her chiropractor who recommended she have her legs evaluated.  There is no family history of varicose veins.    Past Medical History:   Diagnosis Date     Cervical high risk HPV (human papillomavirus) test positive 10/2/14, 3/6/16     Elevated cholesterol      Generalized osteoarthrosis, unspecified site 1/30/2015     Open angle with borderline findings, low risk 1/9/2014     Sick sinus syndrome (H)     PM implant since 2002     Syncope      Past Surgical History:   Procedure Laterality Date     ABDOMEN SURGERY      Tubal     BUNIONECTOMY JACKIE BILATERAL       COLONOSCOPY N/A 8/20/2018    Procedure: COLONOSCOPY;  COLONOSCOPY;  Surgeon: aMnas Lee DO;  Location: [facility]     [facility] OR IMPLANT BREAST       IMPLANT IMPLANTABLE CARDIOVERTER DEFIBRILLATOR  6/4/2012    she does not have an ICD. She has a pacemker.     IMPLANT PACEMAKER  2002    Dual chamber medtronic for sick sinus snyndrome     TUBAL LIGATION  2000     Current Outpatient Medications   Medication     calcium carbonate (OS-SONIA) 500 MG tablet     cholecalciferol 25 MCG (1000 UT) TABS     furosemide (LASIX) 20 MG tablet     mometasone (ELOCON) 0.1 % external ointment     rosuvastatin (CRESTOR) 40 MG tablet     warfarin ANTICOAGULANT (JANTOVEN ANTICOAGULANT) 5 MG tablet     No current  facility-administered medications for this visit.        Allergies   Allergen Reactions     Versed      Hypersensitive     Morphine Sulfate Anxiety     Social History     Socioeconomic History     Marital status: Single     Spouse name: Not on file     Number of children: Not on file     Years of education: Not on file     Highest education level: Not on file   Occupational History     Not on file   Tobacco Use     Smoking status: Never     Smokeless tobacco: Never     Tobacco comments:     Lives in smoke free household   Vaping Use     Vaping status: Never Used     Passive vaping exposure: Yes   Substance and Sexual Activity     Alcohol use: Yes     Comment: twice a month     Drug use: No     Sexual activity: Yes     Partners: Male     Birth control/protection: Surgical, Female Surgical     Comment: tubal ligation   Other Topics Concern     Parent/sibling w/ CABG, MI or angioplasty before 65F 55M? Not Asked   Social History Narrative     Not on file     Social Determinants of Health     Financial Resource Strain: Not on file   Food Insecurity: Not on file   Transportation Needs: Not on file   Physical Activity: Not on file   Stress: Not on file   Social Connections: Not on file   Intimate Partner Violence: Not on file   Housing Stability: Not on file     Family History   Problem Relation Age of Onset     Respiratory Mother 61        COPD     Heart Disease Mother      Eye Disorder Mother         cataract surgery     Lipids Mother      Diabetes Mother      Hypertension Mother      Eye Disorder Father      Glaucoma Father 80        takes drops, frequent apt - suspect it is glaucoma     Hypertension Sister      Cancer Sister      Uterine Cancer Sister      Lipids Sister      Lipids Brother      Hypertension Brother      Cerebrovascular Disease No family hx of      Thyroid Disease No family hx of      Macular Degeneration No family hx of       ROS: 10 point ROS neg other than the symptoms noted above in the  HPI.    PE:  B/P: 118/78, T: 97.3, P: Data Unavailable, R: Data Unavailable  General: well developed, well nourished WF who appears their stated age  HEENT: NC/AT, EOMI, (-)icterus, (-)injection  Neck: Supple, No JVD  Chest: CTA  Heart: S1, S2, (-)m/r/g  Abd: Soft, non tender, non distended, non tender, no masses  Ext; Warm, (+_DP/PT pulses bilat.  +1 edema bilaterally with right greater than left.  Scattered spider veins, no obvious varicosities.  Psych: AAOx3  Neuro: No focal deficits      Impression/plan:  This is a 67-year-old with bilateral leg edema unclear etiology.  She is a CEAP 3 bilaterally.  Right worse than left.  I suspect this may represent lymphedema.  She has no signs of arterial disease as she has palpable pulses.  I discussed the etiology of leg edema and she expressed understanding. After discussion with the patient the plan at this time is obtain an ultrasound of both lower extremities and proceed based on his findings.  She will follow with me after the ultrasound.    Bruce Schwab MD, FACS

## 2023-04-11 ENCOUNTER — ALLIED HEALTH/NURSE VISIT (OUTPATIENT)
Dept: VASCULAR SURGERY | Facility: CLINIC | Age: 68
End: 2023-04-11
Payer: COMMERCIAL

## 2023-04-11 ENCOUNTER — ANCILLARY PROCEDURE (OUTPATIENT)
Dept: ULTRASOUND IMAGING | Facility: CLINIC | Age: 68
End: 2023-04-11
Attending: SPECIALIST
Payer: COMMERCIAL

## 2023-04-11 DIAGNOSIS — R60.0 BILATERAL LEG EDEMA: ICD-10-CM

## 2023-04-11 DIAGNOSIS — R60.0 BILATERAL LEG EDEMA: Primary | ICD-10-CM

## 2023-04-11 PROCEDURE — 93970 EXTREMITY STUDY: CPT | Performed by: SPECIALIST

## 2023-04-11 PROCEDURE — 99212 OFFICE O/P EST SF 10 MIN: CPT

## 2023-04-11 NOTE — NURSING NOTE
Patient Reported symptoms:    Right leg   Heaviness Some of the time   Achiness Some of the time   Swelling Some of the time   Throbbing SOME OF THE TIME  Itching None of the time   Appearance Slightly noticeable   Impact on work/activities Symptoms but full able to participate    Left Leg   Heaviness Some of the time   Achiness Some of the time   Swelling Some of the time   Throbbing Some of the time   Itching None of the time   Appearance Slightly noticeable   Impact on work/activities Symptoms but full able to participate

## 2023-04-14 ENCOUNTER — MYC MEDICAL ADVICE (OUTPATIENT)
Dept: FAMILY MEDICINE | Facility: CLINIC | Age: 68
End: 2023-04-14
Payer: COMMERCIAL

## 2023-04-17 NOTE — TELEPHONE ENCOUNTER
I am okay for her to be seen by Nicole Gray sometimes this month - please use one of her same day, provider request or virtual slots.

## 2023-04-18 ENCOUNTER — VIRTUAL VISIT (OUTPATIENT)
Dept: VASCULAR SURGERY | Facility: CLINIC | Age: 68
End: 2023-04-18
Payer: COMMERCIAL

## 2023-04-18 DIAGNOSIS — R60.0 BILATERAL LEG EDEMA: Primary | ICD-10-CM

## 2023-04-18 PROCEDURE — 99213 OFFICE O/P EST LOW 20 MIN: CPT | Mod: VID | Performed by: SPECIALIST

## 2023-04-18 NOTE — PROGRESS NOTES
Jaimie is a 67 year old who is being evaluated via a billable video visit.      How would you like to obtain your AVS? MyChart  If the video visit is dropped, the invitation should be resent by: Text to cell phone: [phone]  Will anyone else be joining your video visit? No            Video-Visit Details    Type of service:  Video Visit   Video Start Time: 10:09 AM  Video End Time:10:25 AM      Follow-up of ultrasound      Subjective:  Patient is a 67-year-old lady with a many year history of bilateral lower extremity swelling with associated pain.  She feels her right is worse than her left.  She also reports pain early on in the day.  She does have a history of A-fib for which she is on Coumadin.  She also has a pacemaker in place.  She has a history of what sounds like sclerotherapy in 2019 for the swelling.  She denies any leg trauma, DVT, superficial phlebitis or nonhealing wounds.  She reports the swelling does not fully resolve with elevation.  She has not worn compression recently.  She saw her chiropractor who recommended she have her legs evaluated.  There is no family history of varicose veins.    She had her ultrasound which she now follows up.    Objective:   Ext; Warm, (+_DP/PT pulses bilat.  +1 edema bilaterally with right greater than left.  Scattered spider veins, no obvious varicosities.    Name:  Tatiana Skelton                                                      Patient ID: [id number]  Date: 2023                                                    : 1955  Sex: female                                                                 Examined by: JESS Metz RVT  Age:  67 year old                                                         Reading MD: Bruce Schwab MD     INDICATION:  Bilateral vv with swelling and pain; hx of bilateral sclerotherapy at outside clinic.      EXAM TYPE  BILATERAL LOWER EXTREMITY VENOUS DUPLEX FOR VENOUS INSUFFICIENCY  TECHNICAL SUMMARY     A duplex  ultrasound study using color flow was performed, to evaluate the bilateral lower extremity veins for valvular incompetence with the patient in a steep reversed trendelenberg.      RIGHT:     The deep veins demonstrate phasic flow, compress and respond to augmentations.  There is no  DVT.  The common femoral vein is incompetent and free of thrombus. The remaining deep veins are competent and free of thrombus.      Segments of the GSV are not seen 25 mm below the SFJ to the mid thigh and from the mid calf to ankle. The visualized segments of the GSV demonstrates phasic flow, compresses and responds to augmentations  with no evidence of  thrombus. The great saphenous vein measures 6.6 mm at the saphenofemoral junction, is not seen in the proximal thigh and measures 2.7 mm at the knee.  The GSV is incompetent at the proximal calf with a reflux time of 1831 milliseconds.       The AASV is incompetent ( 4.3 mm) draining into the saphenofemoral junction. The AASV is competent at the saphenofemoal junction. And incompetent at the proximal thigh (2.3 mm) with a reflux time of 1353 milliseconds. The AASV takes a straight course for 7 cm. The AASV gives rise to a varicose branch measuring 2.8 mm off the Proximal Thigh that courses Medial with a reflux time of 1039 milliseconds.      The Giacomini vein is competent ( 2.2 mm) communicating with the small saphenous vein at the knee level.      Segments of the SSV are not seen from the SPJ to the mid calf. The visualized segment of the SSV demonstrates phasic flow, compresses and responds to augmentations . No reflux or thrombus is seen.      Perforators:There is an incompetent  vein ( 2.3 mm) at distal calf  12 cm from medial mallelous that communicates with an competent branch. .         LEFT:     The deep veins demonstrate phasic flow, compress and respond to augmentations.  There is no reflux or DVT.       Segments of the GSV are not seen 21 mm below the SFJ to the  ankle. The GSV demonstrates phasic flow, compresses and responds to augmentations at the saphenofemoral junction  with no evidence of reflux or thrombus. The greater saphenous vein measures 5.0 mm at the saphenofemoral junction and is not seen distally.       The AASV is competent ( 3.1 mm) draining into the saphenofemoral junction.      The Giacomini vein is incompetent ( 2.3 mm) communicating with the small saphenous vein at the knee level. The Giacomini vein is incompetent from the distal thigh to the proximal thigh with a reflux time of 5328 milliseconds.      The SSV is not seen in the proximal calf. The SSV demonstrates phasic flow, compresses and responds to augmentations from the popliteal space and from the mid calf to the ankle.  No thrombus is seen. The saphenopopliteal junction is competent ( 3.2 mm). The SSV is incompetent at the mid calf with a reflux time of 1963 milliseconds.  The SSV gives rise to a varicose branch measuring 3.1 mm off the Mid Calf that courses Medial with a reflux time of 3497 milliseconds.       Perforators: there is no evidence of incompetent  veins at any level.      FINAL SUMMARY:  1.  There is no DVT in either lower extremity.  There is right common femoral vein incompetence  2.  The right GSV is not visualized from the SFJ to mid thigh and from mid calf to ankle.  The remaining segments are competent except for a short segment in the mid calf.  3.  The right AASV is competent at the saphenofemoral junction but incompetent in the proximal thigh.  It takes a straight course for 7 cm.  It gives rise to an incompetent varicose branch  4.  The right SSV is not seen from the SP J to mid calf.  5.  There is an incompetent right  12 cm from the medial malleolus that communicates with a competent branch.  6.  The left GSV is not seen SFJ to ankle.  7.  The left AASV is competent  8.  The left SSV is incompetent in the mid calf giving rise to an incompetent varicose  branch.  9.  Incompetence Criteria: Greater than 500 milliseconds reflux in the superficial and  veins and greater than 1000 milliseconds reflux in the deep veins.         Bruce Schwab MD, FACS           Assessment/plan:  This is a 67-year-old with bilateral leg edema unclear etiology.  She is a CEAP 3 bilaterally.  Right worse than left.  Her ultrasound revealed only minimal superficial system reflux and only some minimal deep reflux on the right.  I suspect this may represent lymphedema versus cardiac in origin due to to her ultrasound revealing relatively minimal reflux..  She has no signs of arterial disease as she has palpable pulses.  I discussed the ultrasound findings with the patient she expressed understanding.  After discussion the patient she is good to follow-up with her cardiologist in a couple of weeks and I will give her referral to lymphedema therapy for further evaluation and treatment.  She will follow-up with me as necessary.      Bruce Schwab MD, FACS        Originating Location (pt. Location): Home  Distant Location (provider location):  On-site  Platform used for Video Visit: Annamaria

## 2023-04-18 NOTE — LETTER
2023         RE: Tatiana Skelton  [id number] [address] [place] [id number]        Dear Colleague,    Thank you for referring your patient, Tatiana Skelton, to the [facility] [place]. Please see a copy of my visit note below.    Jaimie is a 67 year old who is being evaluated via a billable video visit.      How would you like to obtain your AVS? MyChart  If the video visit is dropped, the invitation should be resent by: Text to cell phone: [phone]  Will anyone else be joining your video visit? No            Video-Visit Details    Type of service:  Video Visit   Video Start Time: 10:09 AM  Video End Time:10:25 AM      Follow-up of ultrasound      Subjective:  Patient is a 67-year-old lady with a many year history of bilateral lower extremity swelling with associated pain.  She feels her right is worse than her left.  She also reports pain early on in the day.  She does have a history of A-fib for which she is on Coumadin.  She also has a pacemaker in place.  She has a history of what sounds like sclerotherapy in 2019 for the swelling.  She denies any leg trauma, DVT, superficial phlebitis or nonhealing wounds.  She reports the swelling does not fully resolve with elevation.  She has not worn compression recently.  She saw her chiropractor who recommended she have her legs evaluated.  There is no family history of varicose veins.    She had her ultrasound which she now follows up.    Objective:   Ext; Warm, (+_DP/PT pulses bilat.  +1 edema bilaterally with right greater than left.  Scattered spider veins, no obvious varicosities.    Name:  Tatiana Skelton                                                      Patient ID: [id number]  Date: 2023                                                    : 1955  Sex: female                                                                 Examined by: JESS Metz RVT  Age:  67 year  old                                                         Reading MD: Bruce Schwab MD     INDICATION:  Bilateral vv with swelling and pain; hx of bilateral sclerotherapy at outside clinic.      EXAM TYPE  BILATERAL LOWER EXTREMITY VENOUS DUPLEX FOR VENOUS INSUFFICIENCY  TECHNICAL SUMMARY     A duplex ultrasound study using color flow was performed, to evaluate the bilateral lower extremity veins for valvular incompetence with the patient in a steep reversed trendelenberg.      RIGHT:     The deep veins demonstrate phasic flow, compress and respond to augmentations.  There is no  DVT.  The common femoral vein is incompetent and free of thrombus. The remaining deep veins are competent and free of thrombus.      Segments of the GSV are not seen 25 mm below the SFJ to the mid thigh and from the mid calf to ankle. The visualized segments of the GSV demonstrates phasic flow, compresses and responds to augmentations  with no evidence of  thrombus. The great saphenous vein measures 6.6 mm at the saphenofemoral junction, is not seen in the proximal thigh and measures 2.7 mm at the knee.  The GSV is incompetent at the proximal calf with a reflux time of 1831 milliseconds.       The AASV is incompetent ( 4.3 mm) draining into the saphenofemoral junction. The AASV is competent at the saphenofemoal junction. And incompetent at the proximal thigh (2.3 mm) with a reflux time of 1353 milliseconds. The AASV takes a straight course for 7 cm. The AASV gives rise to a varicose branch measuring 2.8 mm off the Proximal Thigh that courses Medial with a reflux time of 1039 milliseconds.      The Giacomini vein is competent ( 2.2 mm) communicating with the small saphenous vein at the knee level.      Segments of the SSV are not seen from the SPJ to the mid calf. The visualized segment of the SSV demonstrates phasic flow, compresses and responds to augmentations . No reflux or thrombus is seen.      Perforators:There is an incompetent   vein ( 2.3 mm) at distal calf  12 cm from medial mallelous that communicates with an competent branch. .         LEFT:     The deep veins demonstrate phasic flow, compress and respond to augmentations.  There is no reflux or DVT.       Segments of the GSV are not seen 21 mm below the SFJ to the ankle. The GSV demonstrates phasic flow, compresses and responds to augmentations at the saphenofemoral junction  with no evidence of reflux or thrombus. The greater saphenous vein measures 5.0 mm at the saphenofemoral junction and is not seen distally.       The AASV is competent ( 3.1 mm) draining into the saphenofemoral junction.      The Giacomini vein is incompetent ( 2.3 mm) communicating with the small saphenous vein at the knee level. The Giacomini vein is incompetent from the distal thigh to the proximal thigh with a reflux time of 5328 milliseconds.      The SSV is not seen in the proximal calf. The SSV demonstrates phasic flow, compresses and responds to augmentations from the popliteal space and from the mid calf to the ankle.  No thrombus is seen. The saphenopopliteal junction is competent ( 3.2 mm). The SSV is incompetent at the mid calf with a reflux time of 1963 milliseconds.  The SSV gives rise to a varicose branch measuring 3.1 mm off the Mid Calf that courses Medial with a reflux time of 3497 milliseconds.       Perforators: there is no evidence of incompetent  veins at any level.      FINAL SUMMARY:  1.  There is no DVT in either lower extremity.  There is right common femoral vein incompetence  2.  The right GSV is not visualized from the SFJ to mid thigh and from mid calf to ankle.  The remaining segments are competent except for a short segment in the mid calf.  3.  The right AASV is competent at the saphenofemoral junction but incompetent in the proximal thigh.  It takes a straight course for 7 cm.  It gives rise to an incompetent varicose branch  4.  The right SSV is not seen from  the SP J to mid calf.  5.  There is an incompetent right  12 cm from the medial malleolus that communicates with a competent branch.  6.  The left GSV is not seen SFJ to ankle.  7.  The left AASV is competent  8.  The left SSV is incompetent in the mid calf giving rise to an incompetent varicose branch.  9.  Incompetence Criteria: Greater than 500 milliseconds reflux in the superficial and  veins and greater than 1000 milliseconds reflux in the deep veins.         Bruce Schwab MD, FACS           Assessment/plan:  This is a 67-year-old with bilateral leg edema unclear etiology.  She is a CEAP 3 bilaterally.  Right worse than left.  Her ultrasound revealed only minimal superficial system reflux and only some minimal deep reflux on the right.  I suspect this may represent lymphedema versus cardiac in origin due to to her ultrasound revealing relatively minimal reflux..  She has no signs of arterial disease as she has palpable pulses.  I discussed the ultrasound findings with the patient she expressed understanding.  After discussion the patient she is good to follow-up with her cardiologist in a couple of weeks and I will give her referral to lymphedema therapy for further evaluation and treatment.  She will follow-up with me as necessary.      Bruce Schwab MD, FACS        Originating Location (pt. Location): Home  Distant Location (provider location):  On-site  Platform used for Video Visit: Annamaria        Again, thank you for allowing me to participate in the care of your patient.        Sincerely,        Bruce Schwab MD

## 2023-04-25 ENCOUNTER — OFFICE VISIT (OUTPATIENT)
Dept: CARDIOLOGY | Facility: CLINIC | Age: 68
End: 2023-04-25
Payer: COMMERCIAL

## 2023-04-25 ENCOUNTER — ANTICOAGULATION THERAPY VISIT (OUTPATIENT)
Dept: ANTICOAGULATION | Facility: CLINIC | Age: 68
End: 2023-04-25

## 2023-04-25 ENCOUNTER — APPOINTMENT (OUTPATIENT)
Dept: LAB | Facility: CLINIC | Age: 68
End: 2023-04-25
Payer: COMMERCIAL

## 2023-04-25 VITALS
HEART RATE: 80 BPM | BODY MASS INDEX: 26.7 KG/M2 | SYSTOLIC BLOOD PRESSURE: 112 MMHG | OXYGEN SATURATION: 96 % | HEIGHT: 68 IN | DIASTOLIC BLOOD PRESSURE: 76 MMHG | WEIGHT: 176.2 LBS

## 2023-04-25 DIAGNOSIS — I49.5 SINUS NODE DYSFUNCTION (H): ICD-10-CM

## 2023-04-25 DIAGNOSIS — I48.0 PAROXYSMAL ATRIAL FIBRILLATION (H): Primary | ICD-10-CM

## 2023-04-25 DIAGNOSIS — R60.0 LOWER EXTREMITY EDEMA: Primary | ICD-10-CM

## 2023-04-25 DIAGNOSIS — R60.9 EDEMA: ICD-10-CM

## 2023-04-25 DIAGNOSIS — Z95.0 CARDIAC PACEMAKER IN SITU: ICD-10-CM

## 2023-04-25 DIAGNOSIS — Z11.4 SCREENING FOR HIV (HUMAN IMMUNODEFICIENCY VIRUS): ICD-10-CM

## 2023-04-25 DIAGNOSIS — I48.0 PAROXYSMAL ATRIAL FIBRILLATION (H): ICD-10-CM

## 2023-04-25 DIAGNOSIS — I25.10 CORONARY ARTERY DISEASE INVOLVING NATIVE CORONARY ARTERY OF NATIVE HEART WITHOUT ANGINA PECTORIS: ICD-10-CM

## 2023-04-25 LAB
ALT SERPL W P-5'-P-CCNC: 24 U/L (ref 10–35)
ANION GAP SERPL CALCULATED.3IONS-SCNC: 12 MMOL/L (ref 7–15)
BUN SERPL-MCNC: 14.8 MG/DL (ref 8–23)
CALCIUM SERPL-MCNC: 9.3 MG/DL (ref 8.8–10.2)
CHLORIDE SERPL-SCNC: 102 MMOL/L (ref 98–107)
CHOLEST SERPL-MCNC: 188 MG/DL
CREAT SERPL-MCNC: 0.8 MG/DL (ref 0.51–0.95)
DEPRECATED HCO3 PLAS-SCNC: 27 MMOL/L (ref 22–29)
GFR SERPL CREATININE-BSD FRML MDRD: 81 ML/MIN/1.73M2
GLUCOSE SERPL-MCNC: 114 MG/DL (ref 70–99)
HDLC SERPL-MCNC: 84 MG/DL
INR BLD: 2.7 (ref 0.9–1.1)
LDLC SERPL CALC-MCNC: 72 MG/DL
NONHDLC SERPL-MCNC: 104 MG/DL
POTASSIUM SERPL-SCNC: 3.7 MMOL/L (ref 3.4–5.3)
SODIUM SERPL-SCNC: 141 MMOL/L (ref 136–145)
TRIGL SERPL-MCNC: 160 MG/DL
TSH SERPL DL<=0.005 MIU/L-ACNC: 2.25 UIU/ML (ref 0.3–4.2)

## 2023-04-25 PROCEDURE — 84443 ASSAY THYROID STIM HORMONE: CPT | Performed by: NURSE PRACTITIONER

## 2023-04-25 PROCEDURE — 99214 OFFICE O/P EST MOD 30 MIN: CPT | Performed by: NURSE PRACTITIONER

## 2023-04-25 PROCEDURE — 80061 LIPID PANEL: CPT | Performed by: NURSE PRACTITIONER

## 2023-04-25 PROCEDURE — 85610 PROTHROMBIN TIME: CPT | Performed by: NURSE PRACTITIONER

## 2023-04-25 PROCEDURE — 80048 BASIC METABOLIC PNL TOTAL CA: CPT

## 2023-04-25 PROCEDURE — 36416 COLLJ CAPILLARY BLOOD SPEC: CPT | Performed by: NURSE PRACTITIONER

## 2023-04-25 PROCEDURE — 84460 ALANINE AMINO (ALT) (SGPT): CPT | Performed by: NURSE PRACTITIONER

## 2023-04-25 PROCEDURE — 87389 HIV-1 AG W/HIV-1&-2 AB AG IA: CPT | Performed by: NURSE PRACTITIONER

## 2023-04-25 PROCEDURE — 36415 COLL VENOUS BLD VENIPUNCTURE: CPT | Performed by: NURSE PRACTITIONER

## 2023-04-25 RX ORDER — FUROSEMIDE 20 MG
40 TABLET ORAL DAILY
Qty: 180 TABLET | Refills: 3 | Status: SHIPPED | OUTPATIENT
Start: 2023-04-25 | End: 2024-05-08

## 2023-04-25 NOTE — PATIENT INSTRUCTIONS
TODAY'S RECOMMENDATIONS:    Please increase Lasix to 40 mg daily  I recommend an echocardiogram  I added a thyroid test to your labs today   Continue all other medications without changes.    If you have questions or concerns please call clinic at [phone].    Please call [phone] for scheduling.      It was a pleasure seeing you today!

## 2023-04-25 NOTE — PROGRESS NOTES
ANTICOAGULATION MANAGEMENT     Tatiana Skelton 67 year old female is on warfarin with therapeutic INR result. (Goal INR 2.0-3.0)    Recent labs: (last 7 days)     04/25/23  1358   INR 2.7*       ASSESSMENT       Source(s): Chart Review and Patient/Caregiver Call       Warfarin doses taken: Warfarin taken as instructed    Diet: No new diet changes identified    Medication/supplement changes: lasix  dose increased on 4/25/23 No interaction anticipated    New illness, injury, or hospitalization: No    Signs or symptoms of bleeding or clotting: No    Previous result: Therapeutic last visit; previously outside of goal range    Additional findings: None         PLAN     Recommended plan for ongoing change(s) affecting INR     Dosing Instructions: Continue your current warfarin dose with next INR in 4 weeks       Summary  As of 4/25/2023    Full warfarin instructions:  10 mg every Mon, Thu; 7.5 mg all other days   Next INR check:  5/23/2023             Telephone call with Jaimie who verbalizes understanding and agrees to plan    Lab visit scheduled    Education provided:     Contact [phone]  with any changes, questions or concerns.     Plan made per [facility] anticoagulation protocol    Paty Harris RN  Anticoagulation Clinic  4/25/2023    _______________________________________________________________________     Anticoagulation Episode Summary     Current INR goal:  2.0-3.0   TTR:  60.7 % (7.3 mo)   Target end date:  Indefinite   Send INR reminders to:  LOU MEJIA    Indications    Paroxysmal atrial fibrillation (H) [I48.0]           Comments:           Anticoagulation Care Providers     Provider Role Specialty Phone number    Doris Farser APRN CNP Referring Cardiovascular Disease [phone]    Nicole Gray NP Referring Nurse Practitioner - Family [phone]

## 2023-04-25 NOTE — LETTER
4/25/2023    Nicole Gray, DANIE  [address] [place] [address]    RE: Tatiana Skelton       Dear Colleague,     I had the pleasure of seeing Tatiana Skelton in the [facility].    General Cardiology Clinic Progress Note  Tatiana Skelton MRN# [id number]   YOB: 1955 Age: 67 year old     Primary cardiologist: Dr. Lomeli    Reason for visit: Concern for lower extremity edema    History of presenting illness:    Tatiana Skelton, a pleasant 67 year old patient who has a past medical history significant for:     Paroxysmal atrial fibrillation: CHADS2-VASc score of 3 (age, gender, coronary artery calcifications) and family history of atrial fibrillation in father, brother and son   Bradycardia and syncope s/p dual chamber PPM  2002 with generator replacement in 2012.   Coronary artery atherosclerosis: Based on CT calcium score in 6/2021  Hypercholesterolemia  Venous insufficiency: Reported previous vein procedures     Jaimie has a history of paroxysmal atrial fibrillation noted on device checks dating back to 1/2019.  Previously she was on Xarelto for CVA prophylaxis but due to cost after transitioning to Medicare she was transition to warfarin.    Recent device check was obtained and the patient reported increased lower extremity edema.  Lasix 20 mg daily was started and a BMP revealed creatine 0.8 and GFR 81    She was recently evaluated by Dr. Hamilton on 4/10/2023 with concerns for bilateral lower extremity edema and pain.  There was no clear etiology determined as her venous insufficiency study showed only superficial venous reflux.  It was recommended that she follow-up with cardiology and was referred to lymphedema treatment clinic.    Today she returns to discuss her ongoing lower extremity edema that has been progressive over the last 2 to 3 years.  She states she has had some improvement since starting Lasix 20 mg daily.  She denies any shortness of breath on  exertion, PND, orthopnea or palpitations.  Her most recent device check showed no atrial arrhythmias.    Diagnostic studies:  Device check (3/23/2023): No significant atrial arrhythmias or ventricular arrhythmias.  The patient was 3% a paced and less than 1% V paced.  Estimated battery life of 7 years.  CT calcium score (6/2021): total score of 609 placing her in the 97 percentile for matched age and gender group.    Stress echocardiogram (July 2021): LVEF was 55 to 60%.          Assessment and Plan:     ASSESSMENT:    LE edema  Increased of the last few years and has been progressive with weight gain. She denies shortness of breath, PND, orthopnea.   Had improvement with Lasix 20 mg daily   BMP from today was stable-reviewed with patient  Was evaluated by Dr. Hamilton referred to lymphedema clinic.   Patient is wearing compression stockings  No significant treatable venous insufficiency on recent venous competency    Paroxysmal atrial fibrillation  Noted on device checks with instances correlating with COVID-19 infection in November 2020   Previously did not tolerate metoprolol due to profound fatigue and of AV jade options are needed in the future could consider calcium channel blockers  UVP2LM2-DLNd score of 3 (age, gender and coronary artery calcifications) currently on warfarin     Coronary artery calcifications  CT calcium score showed total score of 609 ( left main 101, LAD 48, left circumflex 0 and RCA 20) placing her in the 97th percentile for age and gender  FLP (4/25/2023): , HDL 84, LDL 72, Trig 160 (reviewed with patient)  Currently on rosuvastatin 40 mg daily and baby aspirin     History of syncope and bradycardia  Status post permanent pacemaker placement in 2002 in [place] with generator change in 2012    PLAN:     Increase Lasix to 40 mg daily  Add on TSH to today's labs  Agree with starting lymphedema therapy  Obtain echocardiogram  Continue with quarterly device checks  Return in  "approximately 4 to 6 weeks for follow-up       Orders this Visit:  Orders Placed This Encounter   Procedures    TSH with free T4 reflex    Echocardiogram Complete     Orders Placed This Encounter   Medications    furosemide (LASIX) 20 MG tablet     Sig: Take 2 tablets (40 mg) by mouth daily     Dispense:  180 tablet     Refill:  3     Medications Discontinued During This Encounter   Medication Reason    furosemide (LASIX) 20 MG tablet Reorder (No AVS / No eCancel)       Today's clinic visit entailed:  Review of the result(s) of each unique test - Stress, device check, venous competency study, BMP, FLP  Ordering of each unique test  Prescription drug management  35 minutes spent on the date of the encounter doing chart review, history and exam, documentation and further activities per the note  Provider  Link to MDM Help Grid     The level of medical decision making during this visit was of moderate complexity.           Review of Systems:     Review of Systems:  Skin:        Eyes:       ENT:       Respiratory:  Negative shortness of breath;cough;wheezing  Cardiovascular:  Negative;palpitations;chest pain;lightheadedness;dizziness;syncope or near-syncope Positive for;edema  Gastroenterology:      Genitourinary:       Musculoskeletal:       Neurologic:  Negative numbness or tingling of hands;numbness or tingling of feet  Psychiatric:       Heme/Lymph/Imm:  Positive for allergies  Endocrine:                 Physical Exam:     Vitals: /76 (BP Location: Right arm, Patient Position: Sitting, Cuff Size: Adult Regular)   Pulse 80   Ht 1.715 m (5' 7.5\")   Wt 79.9 kg (176 lb 3.2 oz)   LMP  (LMP Unknown)   SpO2 96%   BMI 27.19 kg/m    Constitutional: Well nourished and in no apparent distress.  Eyes: Pupils equal, round. Sclerae anicteric.   HEENT: Normocephalic, atraumatic.   Neck: Supple. JVD   Respiratory: Breathing non-labored. Lungs clear to auscultation bilaterally. No crackles, wheezes, rhonchi, or " rales.  Cardiovascular:  Regular rate and rhythm, normal S1 and S2. No murmur, rub, or gallop.  Skin: Warm, dry. No rashes, cyanosis, or xanthelasma.  Extremities: 1+ bilateral lower extremity edema.  Compression stockings in place.  Neurologic: No gross motor deficits. Alert, awake, and oriented to person, place and time.  Psychiatric: Affect appropriate.             Medications:     Current Outpatient Medications   Medication Sig Dispense Refill    calcium carbonate (OS-SONIA) 500 MG tablet Take 1 tablet by mouth 2 times daily      cholecalciferol 25 MCG (1000 UT) TABS       furosemide (LASIX) 20 MG tablet Take 2 tablets (40 mg) by mouth daily 180 tablet 3    mometasone (ELOCON) 0.1 % external ointment Apply sparingly to affected area twice daily as needed.  Do not apply to face. 45 g 1    rosuvastatin (CRESTOR) 40 MG tablet Take 1 tablet (40 mg) by mouth daily 90 tablet 3    warfarin ANTICOAGULANT (JANTOVEN ANTICOAGULANT) 5 MG tablet TAKE 10 MG (2 TABS) MON ANDTHURS AND 7.5 MG (1 1/2 TABLETS) ALL OTHER DAYS OR AS DIRECTED BY COUMADIN CLINIC 150 tablet 3       Family History   Problem Relation Age of Onset    Respiratory Mother 61        COPD    Heart Disease Mother     Eye Disorder Mother         cataract surgery    Lipids Mother     Diabetes Mother     Hypertension Mother     Eye Disorder Father     Glaucoma Father 80        takes drops, frequent apt - suspect it is glaucoma    Hypertension Sister     Cancer Sister     Uterine Cancer Sister     Lipids Sister     Lipids Brother     Hypertension Brother     Cerebrovascular Disease No family hx of     Thyroid Disease No family hx of     Macular Degeneration No family hx of        Social History     Socioeconomic History    Marital status: Single     Spouse name: Not on file    Number of children: Not on file    Years of education: Not on file    Highest education level: Not on file   Occupational History    Not on file   Tobacco Use    Smoking status: Never     Smokeless tobacco: Never    Tobacco comments:     Lives in smoke free household   Vaping Use    Vaping status: Never Used     Passive vaping exposure: Yes   Substance and Sexual Activity    Alcohol use: Yes     Comment: twice a month    Drug use: No    Sexual activity: Yes     Partners: Male     Birth control/protection: Surgical, Female Surgical     Comment: tubal ligation   Other Topics Concern    Parent/sibling w/ CABG, MI or angioplasty before 65F 55M? Not Asked   Social History Narrative    Not on file     Social Determinants of Health     Financial Resource Strain: Not on file   Food Insecurity: Not on file   Transportation Needs: Not on file   Physical Activity: Not on file   Stress: Not on file   Social Connections: Not on file   Intimate Partner Violence: Not on file   Housing Stability: Not on file            Past Medical History:     Past Medical History:   Diagnosis Date    Cervical high risk HPV (human papillomavirus) test positive 10/2/14, 3/6/16    Elevated cholesterol     Generalized osteoarthrosis, unspecified site 1/30/2015    Open angle with borderline findings, low risk 1/9/2014    Sick sinus syndrome (H)     PM implant since 2002    Syncope               Past Surgical History:     Past Surgical History:   Procedure Laterality Date    ABDOMEN SURGERY      Tubal    BUNIONECTOMY JACKIE BILATERAL      COLONOSCOPY N/A 8/20/2018    Procedure: COLONOSCOPY;  COLONOSCOPY;  Surgeon: Manas Lee DO;  [facility] OR IMPLANT BREAST      IMPLANT IMPLANTABLE CARDIOVERTER DEFIBRILLATOR  6/4/2012    she does not have an ICD. She has a pacemker.    IMPLANT PACEMAKER  2002    Dual chamber medtronic for sick sinus snyndrome    TUBAL LIGATION  2000              Allergies:   Midazolam hcl and Morphine sulfate       Data:   All laboratory data reviewed:    Recent Labs   Lab Test 04/25/23  1358 05/27/22  1200 02/24/22  0830 12/21/17  0811 12/09/16  0806 04/07/16  0810   LDL 72 53 44   < >  106* 129*   HDL 84 109 101   < > 113 101   NHDL 104 66 55   < > 115 141*   CHOL 188 175 156   < > 228* 242*   TRIG 160* 64 55   < > 47 58   TSH 2.25  --   --   --  3.57 2.90    < > = values in this interval not displayed.       Lab Results   Component Value Date    WBC 4.3 01/30/2015    RBC 4.62 01/30/2015    HGB 14.3 01/30/2015    HCT 40.4 01/30/2015    MCV 87 01/30/2015    MCH 31.0 01/30/2015    MCHC 35.4 01/30/2015    RDW 13.6 01/30/2015     01/30/2015       Lab Results   Component Value Date     04/25/2023     04/07/2016    POTASSIUM 3.7 04/25/2023    POTASSIUM 3.8 04/07/2016    CHLORIDE 102 04/25/2023    CHLORIDE 106 04/07/2016    CO2 27 04/25/2023    CO2 29 04/07/2016    ANIONGAP 12 04/25/2023    ANIONGAP 9 04/07/2016     (H) 04/25/2023    GLC 88 12/24/2018    BUN 14.8 04/25/2023    BUN 19 04/07/2016    CR 0.80 04/25/2023    CR 0.78 04/07/2016    GFRESTIMATED 81 04/25/2023    GFRESTIMATED 75 04/07/2016    GFRESTBLACK >90   GFR Calc   04/07/2016    SONIA 9.3 04/25/2023    SONIA 9.7 04/07/2016      Lab Results   Component Value Date    AST 15 01/30/2015    ALT 24 04/25/2023    ALT 17 01/30/2015       No results found for: A1C    Lab Results   Component Value Date    INR 2.7 (H) 04/25/2023    INR 2.2 (H) 03/28/2023         HERMILA VILLEGAS CNP  [facility]  Pager: [phone]  RN phone: [phone]      Thank you for allowing me to participate in the care of your patient.    Sincerely,   HERMILA Paulino CNP   [facility]  cc:   HERMILA Armstrong CNP  [address]  [place] [id number]

## 2023-04-25 NOTE — PROGRESS NOTES
General Cardiology Clinic Progress Note  Tatiana Skelton MRN# [id number]   YOB: 1955 Age: 67 year old     Primary cardiologist: Dr. Lomeli    Reason for visit: Concern for lower extremity edema    History of presenting illness:    Tatiana Skelton, a pleasant 67 year old patient who has a past medical history significant for:     1. Paroxysmal atrial fibrillation: CHADS2-VASc score of 3 (age, gender, coronary artery calcifications) and family history of atrial fibrillation in father, brother and son   2. Bradycardia and syncope s/p dual chamber PPM  2002 with generator replacement in 2012.   3. Coronary artery atherosclerosis: Based on CT calcium score in 6/2021  4. Hypercholesterolemia  5. Venous insufficiency: Reported previous vein procedures     Jaimie has a history of paroxysmal atrial fibrillation noted on device checks dating back to 1/2019.  Previously she was on Xarelto for CVA prophylaxis but due to cost after transitioning to Medicare she was transition to warfarin.    Recent device check was obtained and the patient reported increased lower extremity edema.  Lasix 20 mg daily was started and a BMP revealed creatine 0.8 and GFR 81    She was recently evaluated by Dr. Hamilton on 4/10/2023 with concerns for bilateral lower extremity edema and pain.  There was no clear etiology determined as her venous insufficiency study showed only superficial venous reflux.  It was recommended that she follow-up with cardiology and was referred to lymphedema treatment clinic.    Today she returns to discuss her ongoing lower extremity edema that has been progressive over the last 2 to 3 years.  She states she has had some improvement since starting Lasix 20 mg daily.  She denies any shortness of breath on exertion, PND, orthopnea or palpitations.  Her most recent device check showed no atrial arrhythmias.    Diagnostic studies:    Device check (3/23/2023): No significant atrial arrhythmias or  ventricular arrhythmias.  The patient was 3% a paced and less than 1% V paced.  Estimated battery life of 7 years.    CT calcium score (6/2021): total score of 609 placing her in the 97 percentile for matched age and gender group.      Stress echocardiogram (July 2021): LVEF was 55 to 60%.          Assessment and Plan:     ASSESSMENT:    1. LE edema    Increased of the last few years and has been progressive with weight gain. She denies shortness of breath, PND, orthopnea.     Had improvement with Lasix 20 mg daily     BMP from today was stable-reviewed with patient    Was evaluated by Dr. Hamilton referred to lymphedema clinic.     Patient is wearing compression stockings    No significant treatable venous insufficiency on recent venous competency    2. Paroxysmal atrial fibrillation    Noted on device checks with instances correlating with COVID-19 infection in November 2020     Previously did not tolerate metoprolol due to profound fatigue and of AV jade options are needed in the future could consider calcium channel blockers    [id number] score of 3 (age, gender and coronary artery calcifications) currently on warfarin     2. Coronary artery calcifications    CT calcium score showed total score of 609 ( left main 101, LAD 48, left circumflex 0 and RCA 20) placing her in the 97th percentile for age and gender    FLP (4/25/2023): , HDL 84, LDL 72, Trig 160 (reviewed with patient)    Currently on rosuvastatin 40 mg daily and baby aspirin     3. History of syncope and bradycardia    Status post permanent pacemaker placement in 2002 in [place] with generator change in 2012    PLAN:     1. Increase Lasix to 40 mg daily  2. Add on TSH to today's labs  3. Agree with starting lymphedema therapy  4. Obtain echocardiogram  5. Continue with quarterly device checks  6. Return in approximately 4 to 6 weeks for follow-up       Orders this Visit:  Orders Placed This Encounter   Procedures     TSH with free T4 reflex  "    Echocardiogram Complete     Orders Placed This Encounter   Medications     furosemide (LASIX) 20 MG tablet     Sig: Take 2 tablets (40 mg) by mouth daily     Dispense:  180 tablet     Refill:  3     Medications Discontinued During This Encounter   Medication Reason     furosemide (LASIX) 20 MG tablet Reorder (No AVS / No eCancel)       Today's clinic visit entailed:  Review of the result(s) of each unique test - Stress, device check, venous competency study, BMP, FLP  Ordering of each unique test  Prescription drug management  35 minutes spent on the date of the encounter doing chart review, history and exam, documentation and further activities per the note  Provider  Link to [facility] Help Grid     The level of medical decision making during this visit was of moderate complexity.           Review of Systems:     Review of Systems:  Skin:        Eyes:       ENT:       Respiratory:  Negative shortness of breath;cough;wheezing  Cardiovascular:  Negative;palpitations;chest pain;lightheadedness;dizziness;syncope or near-syncope Positive for;edema  Gastroenterology:      Genitourinary:       Musculoskeletal:       Neurologic:  Negative numbness or tingling of hands;numbness or tingling of feet  Psychiatric:       Heme/Lymph/Imm:  Positive for allergies  Endocrine:                 Physical Exam:     Vitals: /76 (BP Location: Right arm, Patient Position: Sitting, Cuff Size: Adult Regular)   Pulse 80   Ht 1.715 m (5' 7.5\")   Wt 79.9 kg (176 lb 3.2 oz)   LMP  (LMP Unknown)   SpO2 96%   BMI 27.19 kg/m    Constitutional: Well nourished and in no apparent distress.  Eyes: Pupils equal, round. Sclerae anicteric.   HEENT: Normocephalic, atraumatic.   Neck: Supple. JVD   Respiratory: Breathing non-labored. Lungs clear to auscultation bilaterally. No crackles, wheezes, rhonchi, or rales.  Cardiovascular:  Regular rate and rhythm, normal S1 and S2. No murmur, rub, or gallop.  Skin: Warm, dry. No rashes, cyanosis, or " xanthelasma.  Extremities: 1+ bilateral lower extremity edema.  Compression stockings in place.  Neurologic: No gross motor deficits. Alert, awake, and oriented to person, place and time.  Psychiatric: Affect appropriate.             Medications:     Current Outpatient Medications   Medication Sig Dispense Refill     calcium carbonate (OS-SONIA) 500 MG tablet Take 1 tablet by mouth 2 times daily       cholecalciferol 25 MCG (1000 UT) TABS        furosemide (LASIX) 20 MG tablet Take 2 tablets (40 mg) by mouth daily 180 tablet 3     mometasone (ELOCON) 0.1 % external ointment Apply sparingly to affected area twice daily as needed.  Do not apply to face. 45 g 1     rosuvastatin (CRESTOR) 40 MG tablet Take 1 tablet (40 mg) by mouth daily 90 tablet 3     warfarin ANTICOAGULANT (JANTOVEN ANTICOAGULANT) 5 MG tablet TAKE 10 MG (2 TABS) MON ANDTHURS AND 7.5 MG (1 1/2 TABLETS) ALL OTHER DAYS OR AS DIRECTED BY COUMADIN CLINIC 150 tablet 3       Family History   Problem Relation Age of Onset     Respiratory Mother 61        COPD     Heart Disease Mother      Eye Disorder Mother         cataract surgery     Lipids Mother      Diabetes Mother      Hypertension Mother      Eye Disorder Father      Glaucoma Father 80        takes drops, frequent apt - suspect it is glaucoma     Hypertension Sister      Cancer Sister      Uterine Cancer Sister      Lipids Sister      Lipids Brother      Hypertension Brother      Cerebrovascular Disease No family hx of      Thyroid Disease No family hx of      Macular Degeneration No family hx of        Social History     Socioeconomic History     Marital status: Single     Spouse name: Not on file     Number of children: Not on file     Years of education: Not on file     Highest education level: Not on file   Occupational History     Not on file   Tobacco Use     Smoking status: Never     Smokeless tobacco: Never     Tobacco comments:     Lives in smoke free household   Vaping Use     Vaping status:  Never Used     Passive vaping exposure: Yes   Substance and Sexual Activity     Alcohol use: Yes     Comment: twice a month     Drug use: No     Sexual activity: Yes     Partners: Male     Birth control/protection: Surgical, Female Surgical     Comment: tubal ligation   Other Topics Concern     Parent/sibling w/ CABG, MI or angioplasty before 65F 55M? Not Asked   Social History Narrative     Not on file     Social Determinants of Health     Financial Resource Strain: Not on file   Food Insecurity: Not on file   Transportation Needs: Not on file   Physical Activity: Not on file   Stress: Not on file   Social Connections: Not on file   Intimate Partner Violence: Not on file   Housing Stability: Not on file            Past Medical History:     Past Medical History:   Diagnosis Date     Cervical high risk HPV (human papillomavirus) test positive 10/2/14, 3/6/16     Elevated cholesterol      Generalized osteoarthrosis, unspecified site 1/30/2015     Open angle with borderline findings, low risk 1/9/2014     Sick sinus syndrome (H)     PM implant since 2002     Syncope               Past Surgical History:     Past Surgical History:   Procedure Laterality Date     ABDOMEN SURGERY      Tubal     BUNIONECTOMY JACKIE BILATERAL       COLONOSCOPY N/A 8/20/2018    Procedure: COLONOSCOPY;  COLONOSCOPY;  Surgeon: Manas Lee DO;  Location:  GI      OR IMPLANT BREAST       IMPLANT IMPLANTABLE CARDIOVERTER DEFIBRILLATOR  6/4/2012    she does not have an ICD. She has a pacemker.     IMPLANT PACEMAKER  2002    Dual chamber medtronic for sick sinus snyndrome     TUBAL LIGATION  2000              Allergies:   Midazolam hcl and Morphine sulfate       Data:   All laboratory data reviewed:    Recent Labs   Lab Test 04/25/23  1358 05/27/22  1200 02/24/22  0830 12/21/17  0811 12/09/16  0806 04/07/16  0810   LDL 72 53 44   < > 106* 129*   HDL 84 109 101   < > 113 101   NHDL 104 66 55   < > 115 141*   CHOL 188 175 156   <  > 228* 242*   TRIG 160* 64 55   < > 47 58   TSH 2.25  --   --   --  3.57 2.90    < > = values in this interval not displayed.       Lab Results   Component Value Date    WBC 4.3 01/30/2015    RBC 4.62 01/30/2015    HGB 14.3 01/30/2015    HCT 40.4 01/30/2015    MCV 87 01/30/2015    MCH 31.0 01/30/2015    MCHC 35.4 01/30/2015    RDW 13.6 01/30/2015     01/30/2015       Lab Results   Component Value Date     04/25/2023     04/07/2016    POTASSIUM 3.7 04/25/2023    POTASSIUM 3.8 04/07/2016    CHLORIDE 102 04/25/2023    CHLORIDE 106 04/07/2016    CO2 27 04/25/2023    CO2 29 04/07/2016    ANIONGAP 12 04/25/2023    ANIONGAP 9 04/07/2016     (H) 04/25/2023    GLC 88 12/24/2018    BUN 14.8 04/25/2023    BUN 19 04/07/2016    CR 0.80 04/25/2023    CR 0.78 04/07/2016    GFRESTIMATED 81 04/25/2023    GFRESTIMATED 75 04/07/2016    GFRESTBLACK >90   GFR Calc   04/07/2016    SONIA 9.3 04/25/2023    SONIA 9.7 04/07/2016      Lab Results   Component Value Date    AST 15 01/30/2015    ALT 24 04/25/2023    ALT 17 01/30/2015       No results found for: A1C    Lab Results   Component Value Date    INR 2.7 (H) 04/25/2023    INR 2.2 (H) 03/28/2023         HERMILA VILLEGAS [facility]  Pager: [phone]  RN phone: [phone]

## 2023-04-26 LAB — HIV 1+2 AB+HIV1 P24 AG SERPL QL IA: NONREACTIVE

## 2023-04-28 ENCOUNTER — TELEPHONE (OUTPATIENT)
Dept: FAMILY MEDICINE | Facility: CLINIC | Age: 68
End: 2023-04-28
Payer: COMMERCIAL

## 2023-04-28 NOTE — TELEPHONE ENCOUNTER
Patient calling to schedule appointments for a pap and Medicare wellness. She is frustrated due to not getting the correct answers for what providers perform papsmears. Patient was scheduled with an IM provider that does not do papsmears. She also had her 8/2 appointment cancelled without her consent.     Appointment scheduled for Dr. Douglas. Cancelled incorrect appointments. Patient verbalized satisfaction with new scheduled appointments.     NANCY ValderramaN, RN

## 2023-05-09 ENCOUNTER — HOSPITAL ENCOUNTER (OUTPATIENT)
Dept: PHYSICAL THERAPY | Facility: CLINIC | Age: 68
Setting detail: THERAPIES SERIES
Discharge: HOME | End: 2023-05-09
Attending: SPECIALIST
Payer: COMMERCIAL

## 2023-05-09 DIAGNOSIS — R60.0 BILATERAL LEG EDEMA: ICD-10-CM

## 2023-05-09 PROCEDURE — 97161 PT EVAL LOW COMPLEX 20 MIN: CPT | Mod: GP | Performed by: PHYSICAL THERAPIST

## 2023-05-09 PROCEDURE — 97140 MANUAL THERAPY 1/> REGIONS: CPT | Mod: GP | Performed by: PHYSICAL THERAPIST

## 2023-05-09 NOTE — PROGRESS NOTES
[facility]        OUTPATIENT PHYSICAL THERAPY EDEMA EVALUATION  PLAN OF TREATMENT FOR OUTPATIENT REHABILITATION  (COMPLETE FOR INITIAL CLAIMS ONLY)  Patient's Last Name, First Name, Tatiana Cheek                           Provider s Name:   [facility] Medical Record No.  [id number]     Start of Care Date:  05/09/23   Onset Date:  05/09/22   Type:  PT   Medical Diagnosis:  Bilateral leg edema (R60.0), Lymphedema (I89.0)   Therapy Diagnosis:  B LE edema Visits from SOC:  1                                     __________________________________________________________________________________   Plan of Treatment/Functional Goals:    Manual lymph drainage, Gradient compression bandaging, Fit for compression garment, Exercises, Precautions to prevent infection / exacerbation, Education, Manual therapy, Skin care / precautions, Home management program development        GOALS  1. Goal description: Patient will progress to compression garment for long term management of lymphedema and will demonstrate independence with donning/doffing garments.       Target date: 08/01/23  2. Goal description: Patient will demonstrate a volume reduction of 10% or greater of bilateral lower extremities to demonstrate reduction and improvement of his lymphedema.       Target date: 08/01/23  3. Goal description: Pt will demo improved QOL as shown by decreased LLIS score of at least 8 points in order to show significant improvement and greater return to previous function.       Target date: 08/01/23              Treatment Frequency: 3x/week   Treatment duration: 12 weeks    Doug Aquino PT                                  I CERTIFY THE NEED FOR THESE SERVICES FURNISHED UNDER THIS PLAN OF TREATMENT AND WHILE UNDER MY CARE     (Physician signature in associated progress note indicates  review and certification of the therapy plan)                Certification date from: 05/09/23       Certification date to: 08/01/23           Referring physician: Bruce Schwab MD   Initial Assessment  See Epic Evaluation- Start of care: 05/09/23

## 2023-05-09 NOTE — PROGRESS NOTES
05/09/23 1530   Quick Adds   Quick Adds Certification   Rehab Discipline   Discipline PT   Type of Visit   Type of visit Initial Edema Evaluation   General Information   Start of care 05/09/23   Referring physician Bruce Schwab MD   Orders Evaluate and treat as indicated   Order date 04/18/23   Medical diagnosis Bilateral leg edema (R60.0), Lymphedema (I89.0)   Onset of illness / date of surgery 05/09/02  (Pacemaker placed sometime in 2002)   Edema onset 05/09/22   Affected body parts LLE;RLE   Edema etiology Chronic Venous Insufficiency   Pertinent history of current problem (PT: include personal factors and/or comorbidities that impact the POC; OT: include additional occupational profile info) Pt is a 66 yo female who presents to PT with B LE edema and hx of a-fib with pacemaker, venous insufficiency in B LEs. Pt reports her legs are swollen, are usually painful to the touch with increased swelling.   Surgical / medical history reviewed Yes   Prior treatment Compression garments   Patient role / employment history Employed   Fall Risk Screen   Fall screen completed by PT   Have you fallen 2 or more times in the past year? No   Have you fallen and had an injury in the past year? No   Is patient a fall risk? No   Abuse Screen (yes response referral indicated)   Feels Unsafe at Home or Work/School no   Feels Threatened by Someone no   Does Anyone Try to Keep You From Having Contact with Others or Doing Things Outside Your Home? no   Physical Signs of Abuse Present no   Presenting problem B LE edema   Patient needs abuse support services and resources No   System Outcome Measures   Outcome Measures Lymphedema   Lymphedema Life Impact Scale (score range 0-72). A higher score indicates greater impairment. 39   Subjective Report   Patient report of symptoms Pt reports her legs are swollen, tender to touch at times   Patient / Family Goals   Patient / family goals statement Pt wants to reduce the volume in her  legs, she wants to be able to fit into her clothes better and have less pain in her legs.   Edema Exam / Assessment   Skin condition Pitting;Intact   Pitting 2+   Pitting location Below knees and up into thighs B   Stemmer sign Negative   Girth Measurements   Girth Measurements Refer to separate girth measurement flowsheet   Volume LE   Right LE (mL) 7454.7   Left LE (mL) 7207.96   % difference 3.4%   Planned Edema Interventions   Planned edema interventions Manual lymph drainage;Gradient compression bandaging;Fit for compression garment;Exercises;Precautions to prevent infection / exacerbation;Education;Manual therapy;Skin care / precautions;Home management program development   Clinical Impression   Criteria for skilled therapeutic intervention met Yes   Therapy diagnosis B LE edema   Influenced by the following impairments / conditions Edema   Clinical Presentation Stable/Uncomplicated   Clinical Presentation Rationale Pt with chronic condition, stable volumes with use of OTS compression stockings   Clinical Decision Making (Complexity) Low complexity   Treatment Frequency 3x/week   Treatment duration 12 weeks   Patient / family and/or staff in agreement with plan of care Yes   Risks and benefits of therapy have been explained Yes   Clinical impression comments Pt presents to PT with mild pitting edema in B LEs secondary to venous insufficiency, intact skin without wounds. Pt has noted that her legs have gotten bigger over the past year, have become more sensitive to touch as a result. Pt goal is to reduce volumes so she may transition to compression garments for long term mgmt and ability to fit back into her clothes as she did previously.   Education Assessment   Preferred learning style Listening;Demonstration   Barriers to learning No barriers   Goals   Edema Eval Goals 1;2;3   Goal 1   Goal identifier Garments   Goal description Patient will progress to compression garment for long term management of  lymphedema and will demonstrate independence with donning/doffing garments.   Target date 08/01/23   Goal 2   Goal identifier Volume   Goal description Patient will demonstrate a volume reduction of 10% or greater of bilateral lower extremities to demonstrate reduction and improvement of his lymphedema.   Target date 08/01/23   Goal 3   Goal identifier LLIS   Goal description Pt will demo improved QOL as shown by decreased LLIS score of at least 8 points in order to show significant improvement and greater return to previous function.   Goal Progress 39/72 (eval)   Target date 08/01/23   Total Evaluation Time   PT Eval, Low Complexity Minutes (54413) 15   Certification   Certification date from 05/09/23   Certification date to 08/01/23   Medical Diagnosis Bilateral leg edema (R60.0), Lymphedema (I89.0)   Certification I certify the need for these services furnished under this plan of treatment and while under my care.  (Physician co-signature of this document indicates review and certification of the therapy plan).

## 2023-05-22 ENCOUNTER — HOSPITAL ENCOUNTER (OUTPATIENT)
Dept: CARDIOLOGY | Facility: CLINIC | Age: 68
Discharge: HOME | End: 2023-05-22
Attending: NURSE PRACTITIONER | Admitting: NURSE PRACTITIONER
Payer: COMMERCIAL

## 2023-05-22 ENCOUNTER — ANTICOAGULATION THERAPY VISIT (OUTPATIENT)
Dept: ANTICOAGULATION | Facility: CLINIC | Age: 68
End: 2023-05-22

## 2023-05-22 ENCOUNTER — LAB (OUTPATIENT)
Dept: LAB | Facility: CLINIC | Age: 68
End: 2023-05-22
Payer: COMMERCIAL

## 2023-05-22 DIAGNOSIS — I48.0 PAROXYSMAL ATRIAL FIBRILLATION (H): Primary | ICD-10-CM

## 2023-05-22 DIAGNOSIS — R60.0 LOWER EXTREMITY EDEMA: ICD-10-CM

## 2023-05-22 DIAGNOSIS — I25.10 CORONARY ARTERY DISEASE INVOLVING NATIVE CORONARY ARTERY OF NATIVE HEART WITHOUT ANGINA PECTORIS: ICD-10-CM

## 2023-05-22 DIAGNOSIS — I48.0 PAROXYSMAL ATRIAL FIBRILLATION (H): ICD-10-CM

## 2023-05-22 LAB
INR BLD: 2.6 (ref 0.9–1.1)
LVEF ECHO: NORMAL

## 2023-05-22 PROCEDURE — 36416 COLLJ CAPILLARY BLOOD SPEC: CPT

## 2023-05-22 PROCEDURE — 85610 PROTHROMBIN TIME: CPT

## 2023-05-22 PROCEDURE — 93306 TTE W/DOPPLER COMPLETE: CPT | Mod: 26 | Performed by: INTERNAL MEDICINE

## 2023-05-22 PROCEDURE — 93306 TTE W/DOPPLER COMPLETE: CPT

## 2023-05-22 NOTE — PROGRESS NOTES
ANTICOAGULATION MANAGEMENT     Tatiana Skelton 67 year old female is on warfarin with therapeutic INR result. (Goal INR 2.0-3.0)    Recent labs: (last 7 days)     05/22/23  0833   INR 2.6*       ASSESSMENT       Source(s): Chart Review and Patient/Caregiver Call       Warfarin doses taken: Warfarin taken as instructed    Diet: No new diet changes identified    Medication/supplement changes: None noted    New illness, injury, or hospitalization: No    Signs or symptoms of bleeding or clotting: No    Previous result: Therapeutic last 2(+) visits    Additional findings: None         PLAN     Recommended plan for no diet, medication or health factor changes affecting INR     Dosing Instructions: Continue your current warfarin dose with next INR in 5 weeks       Summary  As of 5/22/2023    Full warfarin instructions:  10 mg every Mon, Thu; 7.5 mg all other days   Next INR check:  6/19/2023             Telephone call with Jaimie who verbalizes understanding and agrees to plan and who agrees to plan and repeated back plan correctly    Lab visit scheduled    Education provided:     None required    Plan made per [facility] anticoagulation protocol    Jacque Koehler, RN  Anticoagulation Clinic  5/22/2023    _______________________________________________________________________     Anticoagulation Episode Summary     Current INR goal:  2.0-3.0   TTR:  65.0 % (8.2 mo)   Target end date:  Indefinite   Send INR reminders to:  LOU MEJIA    Indications    Paroxysmal atrial fibrillation (H) [I48.0]           Comments:           Anticoagulation Care Providers     Provider Role Specialty Phone number    Doris Fraser APRN CNP Referring Cardiovascular Disease [phone]    Nicole Gray NP Referring Nurse Practitioner - Family [phone]

## 2023-05-30 DIAGNOSIS — I48.0 PAROXYSMAL ATRIAL FIBRILLATION (H): ICD-10-CM

## 2023-05-30 RX ORDER — WARFARIN SODIUM 5 MG/1
TABLET ORAL
Qty: 140 TABLET | Refills: 1 | Status: SHIPPED | OUTPATIENT
Start: 2023-05-30 | End: 2023-11-20

## 2023-06-26 NOTE — PROGRESS NOTES
DISCHARGE  Reason for Discharge: Patient has failed to schedule further appointments.  Pt has cancelled all future appointments.    Equipment Issued:     Discharge Plan: Patient to continue home program.    Referring Provider:  Bruce Schwab MD       05/09/23 4947   Appointment Info   Signing clinician's name / credentials Doug Aquino, PT, CLT   Visits Used 1 [facility]   GOALS   PT Goals 2;3   PT Goal 1   Goal Identifier Garments   Goal Description Patient will progress to compression garment for long term management of lymphedema and will demonstrate independence with donning/doffing garments.   Target Date 08/01/23   PT Goal 2   Goal Identifier Volume   Goal Description Patient will demonstrate a volume reduction of 10% or greater of bilateral lower extremities to demonstrate reduction and improvement of his lymphedema.   Target Date 08/01/23   PT Goal 3   Goal Identifier LLIS   Goal Description Pt will demo improved QOL as shown by decreased LLIS score of at least 8 points in order to show significant improvement and greater return to previous function.   Target Date 08/01/23   Subjective Report   Subjective Report See eval   Treatment Interventions (PT)   Interventions Manual Therapy   Manual Therapy   Manual Therapy: Mobilization, MFR, MLD, friction massage minutes (60360) 45   Skilled Intervention Education   Patient Response/Progress Pt receptive to education, asks good questions about care and tx   Treatment Detail Pt educated about condition and need for compression bandaging to address. Pt educated about chronic nature and long term mgmt needs with garments. Discussed pt POC and goals to reduce pain to touch, volume of legs. Pt receptive to education and information provided.   Eval/Assessments   PT Eval, Low Complexity Minutes (45035) 15   Education   Learner/Method Patient   Plan   Plan for next session Initiate compression bandaging   Medicare Claim Information   Medical diagnosis Bilateral leg edema  (R60.0), Lymphedema (I89.0)   Therapy diagnosis B LE edema   Start of care 05/09/23   Onset of illness / date of surgery 05/09/02  (Pacemaker placed sometime in 2002)   Certification date from 05/09/23   Session Number   Authorization status Cert

## 2023-06-27 ENCOUNTER — LAB (OUTPATIENT)
Dept: LAB | Facility: CLINIC | Age: 68
End: 2023-06-27
Payer: COMMERCIAL

## 2023-06-27 ENCOUNTER — OFFICE VISIT (OUTPATIENT)
Dept: FAMILY MEDICINE | Facility: CLINIC | Age: 68
End: 2023-06-27
Payer: COMMERCIAL

## 2023-06-27 ENCOUNTER — ANTICOAGULATION THERAPY VISIT (OUTPATIENT)
Dept: ANTICOAGULATION | Facility: CLINIC | Age: 68
End: 2023-06-27

## 2023-06-27 VITALS
BODY MASS INDEX: 27 KG/M2 | TEMPERATURE: 98.8 F | DIASTOLIC BLOOD PRESSURE: 64 MMHG | HEART RATE: 86 BPM | SYSTOLIC BLOOD PRESSURE: 112 MMHG | WEIGHT: 178.13 LBS | HEIGHT: 68 IN | OXYGEN SATURATION: 97 %

## 2023-06-27 DIAGNOSIS — H65.197 OTHER RECURRENT ACUTE NONSUPPURATIVE OTITIS MEDIA, UNSPECIFIED LATERALITY: ICD-10-CM

## 2023-06-27 DIAGNOSIS — I48.0 PAROXYSMAL ATRIAL FIBRILLATION (H): ICD-10-CM

## 2023-06-27 DIAGNOSIS — R35.0 URINARY FREQUENCY: ICD-10-CM

## 2023-06-27 DIAGNOSIS — N39.41 URGE INCONTINENCE OF URINE: ICD-10-CM

## 2023-06-27 DIAGNOSIS — Z78.0 MENOPAUSE PRESENT: ICD-10-CM

## 2023-06-27 DIAGNOSIS — Z00.00 MEDICARE ANNUAL WELLNESS VISIT, INITIAL: Primary | ICD-10-CM

## 2023-06-27 DIAGNOSIS — N95.0 POSTMENOPAUSAL BLEEDING: ICD-10-CM

## 2023-06-27 DIAGNOSIS — I48.0 PAROXYSMAL ATRIAL FIBRILLATION (H): Primary | ICD-10-CM

## 2023-06-27 LAB — INR BLD: 2.8 (ref 0.9–1.1)

## 2023-06-27 PROCEDURE — 85610 PROTHROMBIN TIME: CPT

## 2023-06-27 PROCEDURE — 99213 OFFICE O/P EST LOW 20 MIN: CPT | Mod: 25 | Performed by: FAMILY MEDICINE

## 2023-06-27 PROCEDURE — 90677 PCV20 VACCINE IM: CPT | Performed by: FAMILY MEDICINE

## 2023-06-27 PROCEDURE — G0009 ADMIN PNEUMOCOCCAL VACCINE: HCPCS | Performed by: FAMILY MEDICINE

## 2023-06-27 PROCEDURE — G0438 PPPS, INITIAL VISIT: HCPCS | Performed by: FAMILY MEDICINE

## 2023-06-27 PROCEDURE — 36416 COLLJ CAPILLARY BLOOD SPEC: CPT

## 2023-06-27 ASSESSMENT — ENCOUNTER SYMPTOMS
HEADACHES: 0
PALPITATIONS: 0
DYSURIA: 0
SHORTNESS OF BREATH: 0
HEMATOCHEZIA: 0
HEARTBURN: 0
SORE THROAT: 0
WEAKNESS: 0
PARESTHESIAS: 1
JOINT SWELLING: 0
HEMATURIA: 0
FREQUENCY: 1
COUGH: 0
DIARRHEA: 0
ARTHRALGIAS: 0
BREAST MASS: 0
NERVOUS/ANXIOUS: 0
EYE PAIN: 0
CONSTIPATION: 0
FEVER: 0
NAUSEA: 0
DIZZINESS: 0
ABDOMINAL PAIN: 0
MYALGIAS: 0
CHILLS: 0

## 2023-06-27 ASSESSMENT — ACTIVITIES OF DAILY LIVING (ADL): CURRENT_FUNCTION: NO ASSISTANCE NEEDED

## 2023-06-27 NOTE — PROGRESS NOTES
"SUBJECTIVE:   Jaimie is a 68 year old who presents for Preventive Visit.      6/27/2023     2:53 PM   Additional Questions   Roomed by Doreen VELASQUEZ MA     Are you in the first 12 months of your Medicare coverage?  No    Healthy Habits:     In general, how would you rate your overall health?  Fair    Frequency of exercise:  None    Do you usually eat at least 4 servings of fruit and vegetables a day, include whole grains    & fiber and avoid regularly eating high fat or \"junk\" foods?  Yes    Taking medications regularly:  Yes    Medication side effects:  None and Other    Ability to successfully perform activities of daily living:  No assistance needed    Home Safety:  Throw rugs in the hallway and lack of grab bars in the bathroom    Hearing Impairment:  Difficulty following a conversation in a noisy restaurant or crowded room, feel that people are mumbling or not speaking clearly, difficulty following dialogue in the theater, difficult to understand a speaker at a public meeting or L[employer] day service, need to ask people to speak up or repeat themselves, find that men's voices are easier to understand than woman's, difficulty understanding soft or whispered speech and difficulty understanding speech on the telephone    In the past 6 months, have you been bothered by leaking of urine? Yes    In general, how would you rate your overall mental or emotional health?  Good      PHQ-2 Total Score: 1    Additional concerns today:  No        Have you ever done Advance Care Planning? (For example, a Health Directive, POLST, or a discussion with a medical provider or your loved ones about your wishes): No, advance care planning information given to patient to review.  Patient declined advance care planning discussion at this time.       Fall risk  Fallen 2 or more times in the past year?: No  Any fall with injury in the past year?: No    Cognitive Screening   1) Repeat 3 items (Leader, Season, Table)    2) Clock draw: NORMAL  3) " 3 item recall: Recalls 3 objects  Results: 3 items recalled: COGNITIVE IMPAIRMENT LESS LIKELY    Mini-CogTM Copyright S Bro. Licensed by the author for use in [facility]; reprinted with permission (alo@[facility]). All rights reserved.      Do you have sleep apnea, excessive snoring or daytime drowsiness?: yes    Reviewed and updated as needed this visit by clinical staff   Tobacco  Allergies  Meds  Problems             Reviewed and updated as needed this visit by Provider    Allergies  Meds  Problems            Social History     Tobacco Use    Smoking status: Never    Smokeless tobacco: Never    Tobacco comments:     Lives in smoke free household   Substance Use Topics    Alcohol use: Yes     Comment: twice a month             6/27/2023     3:13 PM   Alcohol Use   Prescreen: >3 drinks/day or >7 drinks/week? No     Do you have a current opioid prescription? No  Do you use any other controlled substances or medications that are not prescribed by a provider? None      Current providers sharing in care for this patient include:   Patient Care Team:  Yadira Douglas MD as PCP - General (Family Medicine)  Davonte Brown MD (Family Practice)  Nicole Gray NP as Assigned PCP  Doris Fraser APRN CNP as Assigned Heart and Vascular Provider  Quentin Andrea MD as MD (Urology)  Rosalinda Golden AuD as Audiologist (Audiology)  Deandre Castro MD as MD (Otolaryngology)  Galina Machado PA-C as Physician Assistant (Dermatology)    The following health maintenance items are reviewed in Epic and correct as of today:  Health Maintenance   Topic Date Due    DEXA  Never done    EYE EXAM  10/22/2014    ZOSTER IMMUNIZATION (3 of 3) 02/14/2019    COVID-19 Vaccine (6 - Moderna series) 04/02/2023    INFLUENZA VACCINE (1) 09/01/2023    MAMMO SCREENING  05/31/2024    MEDICARE ANNUAL WELLNESS VISIT  06/27/2024    ANNUAL REVIEW OF HM ORDERS  06/27/2024    FALL RISK ASSESSMENT   06/27/2024    LIPID  04/25/2028    ADVANCE CARE PLANNING  06/27/2028    COLORECTAL CANCER SCREENING  08/20/2028    DTAP/TDAP/TD IMMUNIZATION (3 - Td or Tdap) 12/08/2032    HEPATITIS C SCREENING  Completed    PHQ-2 (once per calendar year)  Completed    Pneumococcal Vaccine: 65+ Years  Completed    IPV IMMUNIZATION  Aged Out    MENINGITIS IMMUNIZATION  Aged Out    PAP  Discontinued     BP Readings from Last 3 Encounters:   06/27/23 112/64   04/25/23 112/76   04/10/23 118/78    Wt Readings from Last 3 Encounters:   06/27/23 80.8 kg (178 lb 2 oz)   04/25/23 79.9 kg (176 lb 3.2 oz)   04/10/23 81.6 kg (180 lb)                  Patient Active Problem List   Diagnosis    Paroxysmal atrial fibrillation (H)    Frequent UTI    Cardiac Pacemaker- Medtronic, dual chamber- NOT dependant    Sinus bradycardia    Dysplasia of cervix, low grade (SANJU 1)    Hyperlipidemia LDL goal <100    Open angle with borderline findings, low risk    Generalized osteoarthrosis, unspecified site     Past Surgical History:   Procedure Laterality Date    ABDOMEN SURGERY      Tubal    BUNIONECTOMY JACKIE BILATERAL      COLONOSCOPY N/A 08/20/2018    Normal, repeat 10 years    HC OR IMPLANT BREAST      IMPLANT IMPLANTABLE CARDIOVERTER DEFIBRILLATOR  06/04/2012    she does not have an ICD. She has a pacemker.    IMPLANT PACEMAKER  01/01/2002    Dual chamber medtronic for sick sinus snyndrome    TUBAL LIGATION  01/01/2000       Social History     Tobacco Use    Smoking status: Never    Smokeless tobacco: Never    Tobacco comments:     Lives in smoke free household   Substance Use Topics    Alcohol use: Yes     Comment: twice a month     Family History   Problem Relation Age of Onset    Respiratory Mother 61        COPD    Heart Disease Mother     Eye Disorder Mother         cataract surgery    Lipids Mother     Diabetes Mother     Hypertension Mother     Eye Disorder Father     Glaucoma Father 80        takes drops, frequent apt - suspect it is glaucoma     Hypertension Sister     Cancer Sister     Uterine Cancer Sister     Lipids Sister     Lipids Brother     Hypertension Brother     Cerebrovascular Disease No family hx of     Thyroid Disease No family hx of     Macular Degeneration No family hx of              Mammogram Screening: Mammogram Screening: Recommended mammography every 1-2 years with patient discussion and risk factor consideration  History of abnormal Pap smear:   YES - updated in Problem List and Health Maintenance accordingly Last 3 Pap Results:   PAP (no units)   Date Value   12/17/2018 NIL   11/27/2017 NIL   04/06/2016 NIL       FHS-7:       5/27/2022    10:57 AM 6/27/2023     3:17 PM   Breast CA Risk Assessment (FHS-7)   Did any of your first-degree relatives have breast or ovarian cancer? Yes Yes   Did any of your relatives have bilateral breast cancer? No No   Did any man in your family have breast cancer? No No   Did any woman in your family have breast and ovarian cancer? Yes No   Did any woman in your family have breast cancer before age 50 y? Yes No   Do you have 2 or more relatives with breast and/or ovarian cancer? Yes No   Do you have 2 or more relatives with breast and/or bowel cancer? Yes No       Mammogram Screening: Recommended mammography every 1-2 years with patient discussion and risk factor consideration  Pertinent mammograms are reviewed under the imaging tab.    Review of Systems   Constitutional: Negative for chills and fever.   HENT: Positive for ear pain and hearing loss. Negative for congestion and sore throat.    Eyes: Negative for pain and visual disturbance.   Respiratory: Negative for cough and shortness of breath.    Cardiovascular: Positive for peripheral edema. Negative for chest pain and palpitations.   Gastrointestinal: Negative for abdominal pain, constipation, diarrhea, heartburn, hematochezia and nausea.   Breasts:  Negative for tenderness, breast mass and discharge.   Genitourinary: Positive for frequency,  "urgency, vaginal bleeding and vaginal discharge. Negative for dysuria, genital sores, hematuria and pelvic pain.   Musculoskeletal: Negative for arthralgias, joint swelling and myalgias.   Skin: Positive for rash.   Neurological: Positive for paresthesias. Negative for dizziness, weakness and headaches.   Psychiatric/Behavioral: Negative for mood changes. The patient is not nervous/anxious.      She is requesting a few referrals: dermatology for routine skin check and also because she gets a rash on her face.   She would like to see ENT also.  She has history of chronic ear infections and needs follow-up.  She would also like referral to urologist.  She has had dilations in the past and has urinary urgency.  She is recently on a higher dose of Lasix and that has made her urinary frequency worse. She still notes weight gain, concern for fluid retention.     She is in a long-term relationship for 12 years.  No concerns about vaginal infection although she has not been sexually active lately due to pain.  She has had a little bit of vaginal spotting after sex and at times just spontaneously bleeds also.  Not sure if it is due to atrophy or menopause or something else.    Follows with cardiology for a.fib, on coumadin. No other bleeding concerns.         OBJECTIVE:   /64   Pulse 86   Temp 98.8  F (37.1  C) (Temporal)   Ht 1.715 m (5' 7.5\")   Wt 80.8 kg (178 lb 2 oz)   LMP  (LMP Unknown)   SpO2 97%   BMI 27.49 kg/m   Estimated body mass index is 27.49 kg/m  as calculated from the following:    Height as of this encounter: 1.715 m (5' 7.5\").    Weight as of this encounter: 80.8 kg (178 lb 2 oz).  Physical Exam  GENERAL: healthy, alert and no distress  EYES: Eyes grossly normal to inspection, PERRL and conjunctivae and sclerae normal  HENT: ear canals and TM's normal, nose and mouth without ulcers or lesions  NECK: no adenopathy, no asymmetry, masses, or scars and thyroid normal to palpation, no bruits. "   RESP: lungs clear to auscultation - no rales, rhonchi or wheezes  BREAST: normal without masses, tenderness or nipple discharge and no palpable axillary masses or adenopathy  CV: regular rate and rhythm, normal S1 S2, no S3 or S4, no murmur, click or rub, 1-2+ pitting bilateral peripheral edema  ABDOMEN: soft, nontender, no hepatosplenomegaly, no masses and bowel sounds normal  Vaginal exam reveals normal external and internal genitalia.  Cervix is closed, long and thick.  No lesions or abnormalities seen. Bimanual exam reveals a nontender, nongravid uterus with no CMT.  No adnexal masses or tenderness.     MS: no gross musculoskeletal defects noted  SKIN: no suspicious lesions  NEURO: Normal strength and tone, mentation intact and speech normal  PSYCH: mentation appears normal, affect normal/bright      ASSESSMENT / PLAN:       ICD-10-CM    1. Medicare annual wellness visit, initial  Z00.00 Pneumococcal 20 Valent Conjugate (PCV20)     REVIEW OF HEALTH MAINTENANCE PROTOCOL ORDERS     Adult Dermatology Referral      2. Postmenopausal bleeding  N95.0 US Pelvic Complete with Transvaginal      3. Other recurrent acute nonsuppurative otitis media, unspecified laterality  H65.197 Adult ENT  Referral      4. Menopause present  Z78.0 DEXA HIP/PELVIS/SPINE - Future      5. Urinary frequency  R35.0 Adult Urology  Referral      6. Urge incontinence of urine  N39.41 Adult Urology  Referral          Patient has been advised of split billing requirements and indicates understanding: Yes  I recommend a yearly physical, monthly SBE, yearly mammogram. She may repeat pap smear in 2025 if desired due to history of cervical dysplasia.     See orders for dermatology referral due to her rash and for overall skin check.   Also agree to referral to ENT as requested, and urology due to urinary concerns. She gets edema so takes lasix which is aggravating her urinary incontinence.     I recommend a pelvic  "ultrasound to evaluate for worrisome cause of vaginal bleeding. If endometrial thickening noted, will refer to GYN for endometrial biopsy. We discussed possibility of atrophy as cause of bleeding especially given coumadin use.       COUNSELING:  Reviewed preventive health counseling, as reflected in patient instructions  Special attention given to:       Regular exercise       Healthy diet/nutrition       Vision screening       Hearing screening       Dental care       Fall risk prevention       Immunizations   PCV 20 given.              Otherwise up to date except second dose of shingles, can get at pharmacy.             Osteoporosis prevention/bone health - recommend DEXA        Colon cancer screening is up to date       Advanced Planning       BMI:   Estimated body mass index is 27.49 kg/m  as calculated from the following:    Height as of this encounter: 1.715 m (5' 7.5\").    Weight as of this encounter: 80.8 kg (178 lb 2 oz).         She reports that she has never smoked. She has never used smokeless tobacco.      Appropriate preventive services were discussed with this patient, including applicable screening as appropriate for cardiovascular disease, diabetes, osteopenia/osteoporosis, and glaucoma.  As appropriate for age/gender, discussed screening for colorectal cancer, prostate cancer, breast cancer, and cervical cancer. Checklist reviewing preventive services available has been given to the patient.    Reviewed patients plan of care and provided an AVS. The Basic Care Plan (routine screening as documented in Health Maintenance) for Tatiana meets the Care Plan requirement. This Care Plan has been established and reviewed with the Patient.          Yadira Douglas MD  [facility]    Identified Health Risks:    "

## 2023-06-27 NOTE — PATIENT INSTRUCTIONS
You are eligible for the new shingles vaccine called Shingrix. Please check your insurance coverage for this, and you may get this at any place that gives vaccines.      Please check with your insurance regarding coverage for a bone density test called DEXA scan.  You can schedule this at your convenience.     I have entered referrals for ENT, urology, and for dermatology. I also want you to have a pelvic ultrasound to evaluate the vaginal bleeding. Someone will call you for the referrals, but you can call the appointment line to schedule the pelvic ultrasound anytime.     Use generous lubricant for sex. You can use KY jelly, Astroglide or AloeCadabra or any other lubricant you prefer, water based only. (Not vaseline).     Start exercising 150 minutes of aerobic (cardiovascular) exercise every week. Good examples are brisk walking, bicycle riding, walking up and down stairs, dancing, swimming, etc.     After the pelvic ultrasound I will likely be referring you for a uterine biopsy.     Continue to get your yearly mammogram.

## 2023-06-27 NOTE — PROGRESS NOTES
ANTICOAGULATION MANAGEMENT     Tatiana Skelton 68 year old female is on warfarin with therapeutic INR result. (Goal INR 2.0-3.0)    Recent labs: (last 7 days)     06/27/23  1500   INR 2.8*       ASSESSMENT       Source(s): Chart Review and Patient/Caregiver Call       Warfarin doses taken: Warfarin taken as instructed    Diet: No new diet changes identified    Medication/supplement changes: None noted    New illness, injury, or hospitalization: No    Signs or symptoms of bleeding or clotting: No    Previous result: Therapeutic last 2(+) visits    Additional findings: None         PLAN     Recommended plan for no diet, medication or health factor changes affecting INR     Dosing Instructions: Continue your current warfarin dose with next INR in 6 weeks       Summary  As of 6/27/2023    Full warfarin instructions:  10 mg every Mon, Thu; 7.5 mg all other days   Next INR check:  8/8/2023             Telephone call with Jaimie who verbalizes understanding and agrees to plan    Lab visit scheduled    Education provided:     Contact [phone]  with any changes, questions or concerns.     Plan made per [facility] anticoagulation protocol    Paty Harris RN  Anticoagulation Clinic  6/27/2023    _______________________________________________________________________     Anticoagulation Episode Summary     Current INR goal:  2.0-3.0   TTR:  69.5 % (9.4 mo)   Target end date:  Indefinite   Send INR reminders to:  LOU MEJIA    Indications    Paroxysmal atrial fibrillation (H) [I48.0]           Comments:           Anticoagulation Care Providers     Provider Role Specialty Phone number    Doris Fraser APRN CNP Referring Cardiovascular Disease [phone]    Nicole Gray NP Referring Nurse Practitioner - Family [phone]

## 2023-07-18 ENCOUNTER — OFFICE VISIT (OUTPATIENT)
Dept: UROLOGY | Facility: CLINIC | Age: 68
End: 2023-07-18
Payer: COMMERCIAL

## 2023-07-18 ENCOUNTER — HOSPITAL ENCOUNTER (OUTPATIENT)
Dept: BONE DENSITY | Facility: CLINIC | Age: 68
Discharge: HOME | End: 2023-07-18
Attending: FAMILY MEDICINE
Payer: COMMERCIAL

## 2023-07-18 ENCOUNTER — HOSPITAL ENCOUNTER (OUTPATIENT)
Dept: ULTRASOUND IMAGING | Facility: CLINIC | Age: 68
Discharge: HOME | End: 2023-07-18
Attending: FAMILY MEDICINE
Payer: COMMERCIAL

## 2023-07-18 VITALS
DIASTOLIC BLOOD PRESSURE: 68 MMHG | HEIGHT: 69 IN | TEMPERATURE: 98.1 F | SYSTOLIC BLOOD PRESSURE: 110 MMHG | BODY MASS INDEX: 25.92 KG/M2 | WEIGHT: 175 LBS

## 2023-07-18 DIAGNOSIS — N95.0 POSTMENOPAUSAL BLEEDING: ICD-10-CM

## 2023-07-18 DIAGNOSIS — R35.0 URINARY FREQUENCY: Primary | ICD-10-CM

## 2023-07-18 DIAGNOSIS — Z78.0 MENOPAUSE PRESENT: ICD-10-CM

## 2023-07-18 DIAGNOSIS — N39.41 URGE INCONTINENCE OF URINE: ICD-10-CM

## 2023-07-18 LAB
ALBUMIN UR-MCNC: NEGATIVE MG/DL
APPEARANCE UR: CLEAR
BACTERIA #/AREA URNS HPF: ABNORMAL /HPF
BILIRUB UR QL STRIP: NEGATIVE
COLOR UR AUTO: COLORLESS
GLUCOSE UR STRIP-MCNC: NEGATIVE MG/DL
HGB UR QL STRIP: NEGATIVE
KETONES UR STRIP-MCNC: NEGATIVE MG/DL
LEUKOCYTE ESTERASE UR QL STRIP: ABNORMAL
MUCOUS THREADS #/AREA URNS LPF: PRESENT /LPF
NITRATE UR QL: NEGATIVE
PH UR STRIP: 6 [PH] (ref 5–7)
RBC URINE: <1 /HPF
SP GR UR STRIP: 1 (ref 1–1.03)
UROBILINOGEN UR STRIP-MCNC: NORMAL MG/DL
WBC URINE: 3 /HPF

## 2023-07-18 PROCEDURE — 52000 CYSTOURETHROSCOPY: CPT | Performed by: UROLOGY

## 2023-07-18 PROCEDURE — 77080 DXA BONE DENSITY AXIAL: CPT

## 2023-07-18 PROCEDURE — 99205 OFFICE O/P NEW HI 60 MIN: CPT | Mod: 25 | Performed by: UROLOGY

## 2023-07-18 PROCEDURE — 81001 URINALYSIS AUTO W/SCOPE: CPT | Performed by: UROLOGY

## 2023-07-18 PROCEDURE — 76830 TRANSVAGINAL US NON-OB: CPT

## 2023-07-18 ASSESSMENT — PAIN SCALES - GENERAL: PAINLEVEL: NO PAIN (0)

## 2023-07-18 NOTE — PROGRESS NOTES
"Tatiana Skelton is a 68 year old female seen in consultation for urinary urgency. Consult from Yadira Douglas.      Pt has long hx of urinary urgency. Seen by  many yrs ago, underwent numerous urethral dilations, \"about every 4-5 years\" with some improvement in symptoms.     Now on lasix 20 mg twice daily for LE edema.    Also mixed UI, urge > stress, wears one mini pad per day, no pad at nite.    Voids about q hour during the day with noc x 0-1.    Denies dysuria, gross hematuria, use of bladder control meds. Kegel's of no help.    Hx 3 vag deliveries, no hyster, not on HRT. BM's are satisfactory.    Drinks 32 oz of water per day; \"I put flavor in it so I can drink more water.\" Also 1-2 coffee.      Reviewed PMH in detail including AF, bilat leg edema, defibrillator, HPV, sick sinus syndrome, syncope, breast implant        Past Medical History:   Diagnosis Date     Cervical high risk HPV (human papillomavirus) test positive 10/2/14, 3/6/16     Elevated cholesterol      Generalized osteoarthrosis, unspecified site 1/30/2015     Open angle with borderline findings, low risk 1/9/2014     Sick sinus syndrome (H)     PM implant since 2002     Syncope        Past Surgical History:   Procedure Laterality Date     ABDOMEN SURGERY      Tubal     BUNIONECTOMY JACKIE BILATERAL       COLONOSCOPY N/A 08/20/2018    Normal, repeat 10 years     HC OR IMPLANT BREAST       IMPLANT IMPLANTABLE CARDIOVERTER DEFIBRILLATOR  06/04/2012    she does not have an ICD. She has a pacemker.     IMPLANT PACEMAKER  01/01/2002    Dual chamber medtronic for sick sinus snyndrome     TUBAL LIGATION  01/01/2000       Social History     Socioeconomic History     Marital status: Single     Spouse name: Not on file     Number of children: Not on file     Years of education: Not on file     Highest education level: Not on file   Occupational History     Not on file   Tobacco Use     Smoking status: Never     Smokeless tobacco: Never     " Tobacco comments:     Lives in smoke free household   Vaping Use     Vaping Use: Never used   Substance and Sexual Activity     Alcohol use: Yes     Comment: twice a month     Drug use: No     Sexual activity: Yes     Partners: Male     Birth control/protection: Surgical, Female Surgical     Comment: tubal ligation   Other Topics Concern     Parent/sibling w/ CABG, MI or angioplasty before 65F 55M? Not Asked   Social History Narrative     Not on file     Social Determinants of Health     Financial Resource Strain: Not on file   Food Insecurity: Not on file   Transportation Needs: Not on file   Physical Activity: Not on file   Stress: Not on file   Social Connections: Not on file   Intimate Partner Violence: Not on file   Housing Stability: Not on file       Current Outpatient Medications   Medication Sig Dispense Refill     calcium carbonate (OS-SONIA) 500 MG tablet Take 1 tablet by mouth 2 times daily       cholecalciferol 25 MCG (1000 UT) TABS        furosemide (LASIX) 20 MG tablet Take 2 tablets (40 mg) by mouth daily 180 tablet 3     mometasone (ELOCON) 0.1 % external ointment Apply sparingly to affected area twice daily as needed.  Do not apply to face. 45 g 1     rosuvastatin (CRESTOR) 40 MG tablet Take 1 tablet (40 mg) by mouth daily 90 tablet 3     warfarin ANTICOAGULANT (JANTOVEN ANTICOAGULANT) 5 MG tablet TAKE 2 TABLETS BY MOUTH ON MONDAY AND THURSDAY AND TAKE 1.5 TABLETS ON ALL OTHER DAYS AS DIRECTED BY COUMADIN CLINIC 140 tablet 1       Physical Exam:    GENL: NAD.    ABD: Soft, non-tender, no masses.    EG: Moderately-estrogenized, no masses.    VAGINA: Moderately-estrogenized, no masses.    BN HYPERMOBILITY: Minimal.    CYSTOCELE: None.    APICAL PROLAPSE: None.    RECTOCELE: None.    BIMANUAL: No mass or tenderness.    Cysto:    (Informed consent obtained. Pause for cause performed)   Sterile prep.    17 Fr scope inserted through urethra. Systematic examination w 70 degree lens.   PVR: 50 cc   MUCOSA:  Normal without lesion   ORIFICES: Normal location and morphology   CAPACITY: 550 cc; no pain with filling   Scope withdrawn without untoward effect.    (Pt tolerated procedure without difficulty).                  Results for orders placed or performed in visit on 07/18/23   UA reflex to Microscopic     Status: Abnormal   Result Value Ref Range    Color Urine Colorless Colorless, Straw, Light Yellow, Yellow    Appearance Urine Clear Clear    Glucose Urine Negative Negative mg/dL    Bilirubin Urine Negative Negative    Ketones Urine Negative Negative mg/dL    Specific Gravity Urine 1.003 1.003 - 1.035    Blood Urine Negative Negative    pH Urine 6.0 5.0 - 7.0    Protein Albumin Urine Negative Negative mg/dL    Urobilinogen Urine Normal Normal, 2.0 mg/dL    Nitrite Urine Negative Negative    Leukocyte Esterase Urine Small (A) Negative    Bacteria Urine Few (A) None Seen /HPF    RBC Urine <1 <=2 /HPF    WBC Urine 3 <=5 /HPF    Mucus Urine Present (A) None Seen /LPF   Results for orders placed or performed during the hospital encounter of 07/18/23   US Pelvic Complete with Transvaginal     Status: None    Narrative    US PELVIC TRANSABDOMINAL AND TRANSVAGINAL 7/18/2023 8:23 AM    CLINICAL HISTORY: Postmenopausal bleeding  TECHNIQUE: Transabdominal scans were performed. Endovaginal ultrasound  was performed to better visualize the adnexa.    COMPARISON: 4/20/2022    FINDINGS:    UTERUS: 6.3 x 3.2 x 1.7 cm. Normal in size, retroverted in position.   2.8 x 2.4 x 1.5 cm rim calcified shadowing mass along the fundus of  the uterus remains consistent with a calcified fibroid. No significant  change from prior.    ENDOMETRIUM: 3 mm. Endometrium is mildly complex, no discrete masses.    RIGHT OVARY: Obscured by overlying bowel gas.  LEFT OVARY: Obscured by overlying bowel gas.    No significant free fluid.      Impression    IMPRESSION:  1.  Endometrium is 3 mm in thickness and mildly complex.  2.  No significant change in  calcified uterine fundal fibroid.    ELIGIO OWUSU MD         SYSTEM ID:  [id number]         IMP:  1. Urinary urgency, large fluids/large lasix/LE edema; 550 ml bladder capacity  2. No evidence of urethral stenosis       PLAN:  1. Discussed situation with patient in detail.  2. Suggest pt re-think fluids in face of her edema and large dose lasix; pt expresses understanding  3. RTC 1 month to review progress  4. Set realistic expectations  5. Total time spent in reviewing patient records, discussing history, performing exam, discussing diagnosis, outlining treatment plan and documentation: 60 minutes

## 2023-08-22 ENCOUNTER — TELEPHONE (OUTPATIENT)
Dept: ANTICOAGULATION | Facility: CLINIC | Age: 68
End: 2023-08-22
Payer: COMMERCIAL

## 2023-08-22 NOTE — TELEPHONE ENCOUNTER
ANTICOAGULATION     Tatiana Skelton is overdue for INR check.      Spoke with Jaimie and scheduled lab appointment on 8/24/23    Jazmin HERNANDEZ RN

## 2023-08-24 ENCOUNTER — ANTICOAGULATION THERAPY VISIT (OUTPATIENT)
Dept: ANTICOAGULATION | Facility: CLINIC | Age: 68
End: 2023-08-24

## 2023-08-24 ENCOUNTER — LAB (OUTPATIENT)
Dept: LAB | Facility: CLINIC | Age: 68
End: 2023-08-24
Payer: COMMERCIAL

## 2023-08-24 DIAGNOSIS — I48.0 PAROXYSMAL ATRIAL FIBRILLATION (H): ICD-10-CM

## 2023-08-24 DIAGNOSIS — I48.0 PAROXYSMAL ATRIAL FIBRILLATION (H): Primary | ICD-10-CM

## 2023-08-24 LAB — INR BLD: 2.1 (ref 0.9–1.1)

## 2023-08-24 PROCEDURE — 85610 PROTHROMBIN TIME: CPT

## 2023-08-24 PROCEDURE — 36416 COLLJ CAPILLARY BLOOD SPEC: CPT

## 2023-08-24 NOTE — PROGRESS NOTES
ANTICOAGULATION MANAGEMENT     Tatiana Skelton 68 year old female is on warfarin with therapeutic INR result. (Goal INR 2.0-3.0)    Recent labs: (last 7 days)     08/24/23  0909   INR 2.1*       ASSESSMENT     Source(s): Chart Review and Patient/Caregiver Call     Warfarin doses taken: Warfarin taken as instructed  Diet: No new diet changes identified  Medication/supplement changes: None noted  New illness, injury, or hospitalization: No  Signs or symptoms of bleeding or clotting: No  Previous result: Therapeutic last 2(+) visits  Additional findings: None       PLAN     Recommended plan for no diet, medication or health factor changes affecting INR     Dosing Instructions: Continue your current warfarin dose with next INR in 6 weeks       Summary  As of 8/24/2023      Full warfarin instructions:  10 mg every Mon, Thu; 7.5 mg all other days   Next INR check:  10/11/2023               Telephone call with Jaimie who verbalizes understanding and agrees to plan and who agrees to plan and repeated back plan correctly    Lab visit scheduled    Education provided:   Please call back if any changes to your diet, medications or how you've been taking warfarin    Plan made per [facility] anticoagulation protocol    Lois Carpio RN  Anticoagulation Clinic  8/24/2023    _______________________________________________________________________     Anticoagulation Episode Summary       Current INR goal:  2.0-3.0   TTR:  74.7 % (11.3 mo)   Target end date:  Indefinite   Send INR reminders to:  LOU MEJIA    Indications    Paroxysmal atrial fibrillation (H) [I48.0]             Comments:               Anticoagulation Care Providers       Provider Role Specialty Phone number    Doris Fraser APRN CNP Referring Cardiovascular Disease [phone]    Nicole Gray NP Referring Nurse Practitioner - Family [phone]

## 2023-09-12 DIAGNOSIS — E78.00 HYPERCHOLESTEROLEMIA: ICD-10-CM

## 2023-09-12 DIAGNOSIS — I49.5 SINUS NODE DYSFUNCTION (H): ICD-10-CM

## 2023-09-12 DIAGNOSIS — Z95.0 CARDIAC PACEMAKER IN SITU: ICD-10-CM

## 2023-09-12 DIAGNOSIS — I25.10 CORONARY ARTERY DISEASE INVOLVING NATIVE CORONARY ARTERY OF NATIVE HEART WITHOUT ANGINA PECTORIS: ICD-10-CM

## 2023-09-12 RX ORDER — ROSUVASTATIN CALCIUM 40 MG/1
40 TABLET, COATED ORAL DAILY
Qty: 90 TABLET | Refills: 0 | Status: SHIPPED | OUTPATIENT
Start: 2023-09-12 | End: 2023-12-14

## 2023-10-12 ENCOUNTER — LAB (OUTPATIENT)
Dept: LAB | Facility: CLINIC | Age: 68
End: 2023-10-12
Payer: COMMERCIAL

## 2023-10-12 ENCOUNTER — ANTICOAGULATION THERAPY VISIT (OUTPATIENT)
Dept: ANTICOAGULATION | Facility: CLINIC | Age: 68
End: 2023-10-12

## 2023-10-12 DIAGNOSIS — I48.0 PAROXYSMAL ATRIAL FIBRILLATION (H): ICD-10-CM

## 2023-10-12 DIAGNOSIS — I48.0 PAROXYSMAL ATRIAL FIBRILLATION (H): Primary | ICD-10-CM

## 2023-10-12 LAB — INR BLD: 3.3 (ref 0.9–1.1)

## 2023-10-12 PROCEDURE — 85610 PROTHROMBIN TIME: CPT

## 2023-10-12 PROCEDURE — 36416 COLLJ CAPILLARY BLOOD SPEC: CPT

## 2023-10-12 NOTE — PROGRESS NOTES
ANTICOAGULATION MANAGEMENT     Tatiana Skelton 68 year old female is on warfarin with supratherapeutic INR result. (Goal INR 2.0-3.0)    Recent labs: (last 7 days)     10/12/23  1523   INR 3.3*       ASSESSMENT     Source(s): Chart Review and Patient/Caregiver Call     Warfarin doses taken: Warfarin taken as instructed  Diet: Decreased greens/vitamin K in diet; ongoing change  Medication/supplement changes: None noted  New illness, injury, or hospitalization: No  Signs or symptoms of bleeding or clotting: No  Previous result: Therapeutic last 2(+) visits  Additional findings:  patient states that she is eating less, trying to lose weight, small portions during the day and then one full meal in the evening        PLAN     Recommended plan for ongoing change(s) affecting INR     Dosing Instructions: decrease your warfarin dose (4% change) with next INR in 2 weeks       Summary  As of 10/12/2023      Full warfarin instructions:  10 mg every Mon; 7.5 mg all other days   Next INR check:  10/26/2023               Telephone call with Jaimie who verbalizes understanding and agrees to plan    Lab visit scheduled    Education provided:   Goal range and lab monitoring: goal range and significance of current result  Contact [phone]  with any changes, questions or concerns.     Plan made per [facility] anticoagulation protocol    Paty Harris RN  Anticoagulation Clinic  10/12/2023    _______________________________________________________________________     Anticoagulation Episode Summary       Current INR goal:  2.0-3.0   TTR:  78.5% (1 y)   Target end date:  Indefinite   Send INR reminders to:  LOU MEJIA    Indications    Paroxysmal atrial fibrillation (H) [I48.0]             Comments:               Anticoagulation Care Providers       Provider Role Specialty Phone number    Doris Fraser APRN CNP Referring Cardiovascular Disease [phone]    Nicole Gray NP Referring Nurse Practitioner - Family  [phone]

## 2023-10-27 ENCOUNTER — ANCILLARY PROCEDURE (OUTPATIENT)
Dept: CARDIOLOGY | Facility: CLINIC | Age: 68
End: 2023-10-27
Attending: INTERNAL MEDICINE
Payer: COMMERCIAL

## 2023-10-27 DIAGNOSIS — Z95.0 CARDIAC PACEMAKER IN SITU: ICD-10-CM

## 2023-10-27 DIAGNOSIS — I49.5 SINUS NODE DYSFUNCTION (H): ICD-10-CM

## 2023-10-27 PROCEDURE — 93294 REM INTERROG EVL PM/LDLS PM: CPT | Performed by: INTERNAL MEDICINE

## 2023-10-27 PROCEDURE — 93296 REM INTERROG EVL PM/IDS: CPT | Performed by: INTERNAL MEDICINE

## 2023-10-30 LAB
MDC_IDC_LEAD_CONNECTION_STATUS: NORMAL
MDC_IDC_LEAD_CONNECTION_STATUS: NORMAL
MDC_IDC_LEAD_IMPLANT_DT: NORMAL
MDC_IDC_LEAD_IMPLANT_DT: NORMAL
MDC_IDC_LEAD_LOCATION: NORMAL
MDC_IDC_LEAD_LOCATION: NORMAL
MDC_IDC_LEAD_LOCATION_DETAIL_1: NORMAL
MDC_IDC_LEAD_LOCATION_DETAIL_1: NORMAL
MDC_IDC_LEAD_MFG: NORMAL
MDC_IDC_LEAD_MFG: NORMAL
MDC_IDC_LEAD_MODEL: NORMAL
MDC_IDC_LEAD_MODEL: NORMAL
MDC_IDC_LEAD_POLARITY_TYPE: NORMAL
MDC_IDC_LEAD_POLARITY_TYPE: NORMAL
MDC_IDC_LEAD_SERIAL: NORMAL
MDC_IDC_LEAD_SERIAL: NORMAL
MDC_IDC_LEAD_SPECIAL_FUNCTION: NORMAL
MDC_IDC_MSMT_BATTERY_DTM: NORMAL
MDC_IDC_MSMT_BATTERY_IMPEDANCE: 1170 OHM
MDC_IDC_MSMT_BATTERY_REMAINING_LONGEVITY: 71 MO
MDC_IDC_MSMT_BATTERY_STATUS: NORMAL
MDC_IDC_MSMT_BATTERY_VOLTAGE: 2.77 V
MDC_IDC_MSMT_LEADCHNL_RA_IMPEDANCE_VALUE: 500 OHM
MDC_IDC_MSMT_LEADCHNL_RA_PACING_THRESHOLD_AMPLITUDE: 0.62 V
MDC_IDC_MSMT_LEADCHNL_RA_PACING_THRESHOLD_PULSEWIDTH: 0.4 MS
MDC_IDC_MSMT_LEADCHNL_RV_IMPEDANCE_VALUE: 750 OHM
MDC_IDC_MSMT_LEADCHNL_RV_PACING_THRESHOLD_AMPLITUDE: 1.12 V
MDC_IDC_MSMT_LEADCHNL_RV_PACING_THRESHOLD_PULSEWIDTH: 0.4 MS
MDC_IDC_PG_IMPLANT_DTM: NORMAL
MDC_IDC_PG_MFG: NORMAL
MDC_IDC_PG_MODEL: NORMAL
MDC_IDC_PG_SERIAL: NORMAL
MDC_IDC_PG_TYPE: NORMAL
MDC_IDC_SESS_CLINIC_NAME: NORMAL
MDC_IDC_SESS_DTM: NORMAL
MDC_IDC_SESS_TYPE: NORMAL
MDC_IDC_SET_BRADY_AT_MODE_SWITCH_MODE: NORMAL
MDC_IDC_SET_BRADY_AT_MODE_SWITCH_RATE: 175 {BEATS}/MIN
MDC_IDC_SET_BRADY_LOWRATE: 50 {BEATS}/MIN
MDC_IDC_SET_BRADY_MAX_SENSOR_RATE: 130 {BEATS}/MIN
MDC_IDC_SET_BRADY_MAX_TRACKING_RATE: 130 {BEATS}/MIN
MDC_IDC_SET_BRADY_MODE: NORMAL
MDC_IDC_SET_BRADY_PAV_DELAY_LOW: 150 MS
MDC_IDC_SET_BRADY_SAV_DELAY_LOW: 120 MS
MDC_IDC_SET_LEADCHNL_RA_PACING_AMPLITUDE: 1.5 V
MDC_IDC_SET_LEADCHNL_RA_PACING_CAPTURE_MODE: NORMAL
MDC_IDC_SET_LEADCHNL_RA_PACING_POLARITY: NORMAL
MDC_IDC_SET_LEADCHNL_RA_PACING_PULSEWIDTH: 0.4 MS
MDC_IDC_SET_LEADCHNL_RA_SENSING_POLARITY: NORMAL
MDC_IDC_SET_LEADCHNL_RA_SENSING_SENSITIVITY: 0.5 MV
MDC_IDC_SET_LEADCHNL_RV_PACING_AMPLITUDE: 2.25 V
MDC_IDC_SET_LEADCHNL_RV_PACING_CAPTURE_MODE: NORMAL
MDC_IDC_SET_LEADCHNL_RV_PACING_POLARITY: NORMAL
MDC_IDC_SET_LEADCHNL_RV_PACING_PULSEWIDTH: 0.4 MS
MDC_IDC_SET_LEADCHNL_RV_SENSING_POLARITY: NORMAL
MDC_IDC_SET_LEADCHNL_RV_SENSING_SENSITIVITY: 4 MV
MDC_IDC_SET_ZONE_DETECTION_INTERVAL: 333.33 MS
MDC_IDC_SET_ZONE_DETECTION_INTERVAL: 342.86 MS
MDC_IDC_SET_ZONE_STATUS: NORMAL
MDC_IDC_SET_ZONE_STATUS: NORMAL
MDC_IDC_SET_ZONE_TYPE: NORMAL
MDC_IDC_SET_ZONE_TYPE: NORMAL
MDC_IDC_SET_ZONE_VENDOR_TYPE: NORMAL
MDC_IDC_SET_ZONE_VENDOR_TYPE: NORMAL
MDC_IDC_STAT_AT_BURDEN_PERCENT: 0.2 %
MDC_IDC_STAT_AT_DTM_END: NORMAL
MDC_IDC_STAT_AT_DTM_START: NORMAL
MDC_IDC_STAT_AT_MODE_SW_COUNT: 18
MDC_IDC_STAT_BRADY_AP_VP_PERCENT: 0 %
MDC_IDC_STAT_BRADY_AP_VS_PERCENT: 3 %
MDC_IDC_STAT_BRADY_AS_VP_PERCENT: 0 %
MDC_IDC_STAT_BRADY_AS_VS_PERCENT: 96 %
MDC_IDC_STAT_BRADY_DTM_END: NORMAL
MDC_IDC_STAT_BRADY_DTM_START: NORMAL
MDC_IDC_STAT_EPISODE_RECENT_COUNT: 0
MDC_IDC_STAT_EPISODE_RECENT_COUNT: 1
MDC_IDC_STAT_EPISODE_RECENT_COUNT_DTM_END: NORMAL
MDC_IDC_STAT_EPISODE_RECENT_COUNT_DTM_END: NORMAL
MDC_IDC_STAT_EPISODE_RECENT_COUNT_DTM_START: NORMAL
MDC_IDC_STAT_EPISODE_RECENT_COUNT_DTM_START: NORMAL
MDC_IDC_STAT_EPISODE_TYPE: NORMAL
MDC_IDC_STAT_EPISODE_TYPE: NORMAL

## 2023-10-31 NOTE — PROGRESS NOTES
ENT Consultation    Tatiana Skelton who is a 68 year old female seen in consultation at the request of Dr. Douglas.      History of Present Illness - Tatiana Skelton is a 68 year old female recurrent ear infections   Patient with a long history of otologic issues.  She has had multiple sets of tubes in the past also multiple episodes of otitis externa.  She had tympanoplasty in the past.  She constantly currently gets itchy ears get plugged.  Not much comes out.  There has been no discharge.  She does wear hearing aids normally because of the itch she does not like wearing hearing aids.  Denies any new tinnitus vertigo or dizziness.        BP Readings from Last 1 Encounters:   11/06/23 120/82       BP noted to be well controlled today in office.     Tatiana IS NOT a smoker/uses chewing tobacco.     Past Medical History -   Past Medical History:   Diagnosis Date    Cervical high risk HPV (human papillomavirus) test positive 10/2/14, 3/6/16    Elevated cholesterol     Generalized osteoarthrosis, unspecified site 1/30/2015    Open angle with borderline findings, low risk 1/9/2014    Sick sinus syndrome (H)     PM implant since 2002    Syncope        Current Medications -   Current Outpatient Medications:     calcium carbonate (OS-SONIA) 500 MG tablet, Take 1 tablet by mouth 2 times daily, Disp: , Rfl:     cholecalciferol 25 MCG (1000 UT) TABS, , Disp: , Rfl:     furosemide (LASIX) 20 MG tablet, Take 2 tablets (40 mg) by mouth daily, Disp: 180 tablet, Rfl: 3    mometasone (ELOCON) 0.1 % external ointment, Apply sparingly to affected area twice daily as needed.  Do not apply to face., Disp: 45 g, Rfl: 1    rosuvastatin (CRESTOR) 40 MG tablet, Take 1 tablet (40 mg) by mouth daily, Disp: 90 tablet, Rfl: 0    warfarin ANTICOAGULANT (JANTOVEN ANTICOAGULANT) 5 MG tablet, TAKE 2 TABLETS BY MOUTH ON MONDAY AND THURSDAY AND TAKE 1.5 TABLETS ON ALL OTHER DAYS AS DIRECTED BY COUMADIN CLINIC, Disp: 140 tablet, Rfl:  "1    Allergies -   Allergies   Allergen Reactions    Midazolam Hcl      Hypersensitive    Morphine Sulfate Anxiety       Social History -   Social History     Socioeconomic History    Marital status: Single   Tobacco Use    Smoking status: Never    Smokeless tobacco: Never    Tobacco comments:     Lives in smoke free household   Vaping Use    Vaping Use: Never used   Substance and Sexual Activity    Alcohol use: Yes     Comment: twice a month    Drug use: No    Sexual activity: Yes     Partners: Male     Birth control/protection: Surgical, Female Surgical     Comment: tubal ligation       Family History -   Family History   Problem Relation Age of Onset    Respiratory Mother 61        COPD    Heart Disease Mother     Eye Disorder Mother         cataract surgery    Lipids Mother     Diabetes Mother     Hypertension Mother     Eye Disorder Father     Glaucoma Father 80        takes drops, frequent apt - suspect it is glaucoma    Hypertension Sister     Cancer Sister     Uterine Cancer Sister     Lipids Sister     Lipids Brother     Hypertension Brother     Cerebrovascular Disease No family hx of     Thyroid Disease No family hx of     Macular Degeneration No family hx of        Review of Systems - As per HPI and PMHx, otherwise review of system review of the head and neck negative. Otherwise 10+ review of system is negative    Physical Exam  /82   Temp 97.2  F (36.2  C) (Tympanic)   Ht 1.753 m (5' 9\")   Wt 80.7 kg (178 lb)   LMP  (LMP Unknown)   BMI 26.29 kg/m    BMI: Body mass index is 26.29 kg/m .    General - The patient is well nourished and well developed, and appears to have good nutritional status.  Alert and oriented to person and place, answers questions and cooperates with examination appropriately.    SKIN - No suspicious lesions or rashes.  Respiration - No respiratory distress.  Head and Face - Normocephalic and atraumatic, with no gross asymmetry noted of the contour of the facial features.  " The facial nerve is intact, with strong symmetric movements.    Voice and Breathing - The patient was breathing comfortably without the use of accessory muscles. The patients voice was clear and strong, and had appropriate pitch and quality.    Ears - Bilateral pinna and EACs with normal appearing overlying skin. Tympanic membrane intact with good mobility on pneumatic otoscopy bilaterally. Bony landmarks of the ossicular chain are normal. The tympanic membranes are normal in appearance but somewhat thickened with some dry debris and skin on them. No retraction, perforation, or masses.  No fluid or purulence was seen in the external canal or the middle ear.   Dry debris also noted in each ear canal without occlusion.  Eyes - Extraocular movements intact.  Sclera were not icteric or injected, conjunctiva were pink and moist.      Neuro - Nonfocal neuro exam is normal, CN 2 through 12 intact, normal gait and muscle tone.      Performed in clinic today:  Audiologic Studies - An audiogram and tympanogram were performed today as part of the evaluation and personally reviewed. The tympanogram shows Type a curves on the right and Type a curves on the left, with normal canal volumes and middle ear pressures.  The audiogram showed mild to moderate SNHL on the right and mild to moderate SNHL on the left.      Excellent word recognition bilaterally with 100% on the right and 96% on the left.  A/P - Tatiana Skelton is a 68 year old female patient with mostly dryness in the ear canals some thickening of the drums but otherwise serviceable ears.  In regard to the dryness encouraged the patient to use either baby oil or extra virgin olive oil several times a week.  Also will prescribe Diprolene cream to use as needed for itch daily.  Patient will follow-up with audiology for reprogramming of hearing aids.  We will check the hearing in a year.      Deandre Castro MD

## 2023-11-06 ENCOUNTER — ANTICOAGULATION THERAPY VISIT (OUTPATIENT)
Dept: ANTICOAGULATION | Facility: CLINIC | Age: 68
End: 2023-11-06

## 2023-11-06 ENCOUNTER — OFFICE VISIT (OUTPATIENT)
Dept: AUDIOLOGY | Facility: CLINIC | Age: 68
End: 2023-11-06
Payer: COMMERCIAL

## 2023-11-06 ENCOUNTER — OFFICE VISIT (OUTPATIENT)
Dept: FAMILY MEDICINE | Facility: CLINIC | Age: 68
End: 2023-11-06
Payer: COMMERCIAL

## 2023-11-06 ENCOUNTER — OFFICE VISIT (OUTPATIENT)
Dept: OTOLARYNGOLOGY | Facility: CLINIC | Age: 68
End: 2023-11-06
Payer: COMMERCIAL

## 2023-11-06 VITALS
HEART RATE: 75 BPM | OXYGEN SATURATION: 97 % | SYSTOLIC BLOOD PRESSURE: 132 MMHG | WEIGHT: 176 LBS | DIASTOLIC BLOOD PRESSURE: 82 MMHG | HEIGHT: 69 IN | RESPIRATION RATE: 14 BRPM | BODY MASS INDEX: 26.07 KG/M2

## 2023-11-06 VITALS
SYSTOLIC BLOOD PRESSURE: 120 MMHG | DIASTOLIC BLOOD PRESSURE: 82 MMHG | HEIGHT: 69 IN | TEMPERATURE: 97.2 F | BODY MASS INDEX: 26.36 KG/M2 | WEIGHT: 178 LBS

## 2023-11-06 DIAGNOSIS — H65.197 OTHER RECURRENT ACUTE NONSUPPURATIVE OTITIS MEDIA, UNSPECIFIED LATERALITY: ICD-10-CM

## 2023-11-06 DIAGNOSIS — I48.0 PAROXYSMAL ATRIAL FIBRILLATION (H): Primary | ICD-10-CM

## 2023-11-06 DIAGNOSIS — L29.9 EAR ITCH: Primary | ICD-10-CM

## 2023-11-06 DIAGNOSIS — H90.3 SENSORINEURAL HEARING LOSS, BILATERAL: ICD-10-CM

## 2023-11-06 DIAGNOSIS — H93.13 TINNITUS OF BOTH EARS: ICD-10-CM

## 2023-11-06 DIAGNOSIS — H90.3 SENSORINEURAL HEARING LOSS, BILATERAL: Primary | ICD-10-CM

## 2023-11-06 DIAGNOSIS — S20.212A CONTUSION OF LEFT CHEST WALL, INITIAL ENCOUNTER: Primary | ICD-10-CM

## 2023-11-06 PROCEDURE — 99212 OFFICE O/P EST SF 10 MIN: CPT | Performed by: FAMILY MEDICINE

## 2023-11-06 PROCEDURE — 92557 COMPREHENSIVE HEARING TEST: CPT | Performed by: AUDIOLOGIST

## 2023-11-06 PROCEDURE — 99203 OFFICE O/P NEW LOW 30 MIN: CPT | Performed by: OTOLARYNGOLOGY

## 2023-11-06 PROCEDURE — 99207 PR NO CHARGE LOS: CPT | Performed by: AUDIOLOGIST

## 2023-11-06 PROCEDURE — 92567 TYMPANOMETRY: CPT | Performed by: AUDIOLOGIST

## 2023-11-06 RX ORDER — MOMETASONE FUROATE 1 MG/G
CREAM TOPICAL DAILY PRN
Qty: 45 G | Refills: 1 | Status: SHIPPED | OUTPATIENT
Start: 2023-11-06

## 2023-11-06 RX ORDER — RESPIRATORY SYNCYTIAL VIRUS VACCINE 120MCG/0.5
0.5 KIT INTRAMUSCULAR ONCE
Qty: 1 EACH | Refills: 0 | Status: CANCELLED | OUTPATIENT
Start: 2023-11-06 | End: 2023-11-06

## 2023-11-06 ASSESSMENT — PAIN SCALES - GENERAL
PAINLEVEL: NO PAIN (0)
PAINLEVEL: MODERATE PAIN (4)

## 2023-11-06 NOTE — PROGRESS NOTES
{PROVIDER CHARTING PREFERENCE:726016}    Tu Etienne is a 68 year old, presenting for the following health issues:  Fall      11/6/2023     1:49 PM   Additional Questions   Roomed by Carlos LOPES       Fall    History of Present Illness       Reason for visit:  Sternum  Symptom onset:  1-2 weeks ago    She eats 2-3 servings of fruits and vegetables daily.She consumes 0 sweetened beverage(s) daily.She exercises with enough effort to increase her heart rate 9 or less minutes per day.  She exercises with enough effort to increase her heart rate 3 or less days per week.   She is taking medications regularly.         ***  {additonal problems for provider to add (Optional):305687}      Review of Systems   {ROS COMP (Optional):879077}      Objective    LMP  (LMP Unknown)   There is no height or weight on file to calculate BMI.  Physical Exam   {Exam List (Optional):173656}    {Diagnostic Test Results (Optional):694931}    {AMBULATORY ATTESTATION (Optional):896741}

## 2023-11-06 NOTE — PROGRESS NOTES
AUDIOLOGY REPORT     SUMMARY: Audiology visit completed. See audiogram for results.     RECOMMENDATIONS: Follow-up with ENT    Jared Alas Licensed Audiologist #[id number]

## 2023-11-06 NOTE — PROGRESS NOTES
ANTICOAGULATION MANAGEMENT     Tatiana Skelton 68 year old female is on warfarin with therapeutic INR result. (Goal INR 2.0-3.0)    Recent labs: (last 7 days)     11/06/23  0924   INR 2.3*       ASSESSMENT     Source(s): Chart Review and Patient/Caregiver Call     Warfarin doses taken: Warfarin taken as instructed  Diet: No new diet changes identified  Medication/supplement changes: None noted  New illness, injury, or hospitalization: No  Signs or symptoms of bleeding or clotting: No  Previous result: Supratherapeutic  Additional findings: None       PLAN     Recommended plan for no diet, medication or health factor changes affecting INR     Dosing Instructions: Continue your current warfarin dose with next INR in 6 weeks       Summary  As of 11/6/2023      Full warfarin instructions:  10 mg every Mon; 7.5 mg all other days   Next INR check:  12/19/2023               Telephone call with Jaimie who verbalizes understanding and agrees to plan and who agrees to plan and repeated back plan correctly    Lab visit scheduled    Education provided:   Goal range and lab monitoring: goal range and significance of current result, Importance of therapeutic range, and Importance of following up at instructed interval  Contact [phone]  with any changes, questions or concerns.     Plan made per [facility] anticoagulation protocol    Lois Carpio RN  Anticoagulation Clinic  11/6/2023    _______________________________________________________________________     Anticoagulation Episode Summary       Current INR goal:  2.0-3.0   TTR:  79.9% (1 y)   Target end date:  Indefinite   Send INR reminders to:  [facility]    Indications    Paroxysmal atrial fibrillation (H) [I48.0]             Comments:               Anticoagulation Care Providers       Provider Role Specialty Phone number    Doris Fraser APRN CNP Referring Cardiovascular Disease [phone]    Nicole Gray NP Referring Nurse Practitioner - Family  [phone]

## 2023-11-06 NOTE — PROGRESS NOTES
Assessment & Plan     Contusion of left chest wall, initial encounter  Patient will be treated with observation.  Since she is over 72 hours out she can alternate ice and heat if needed.  She will contact me if she develops any new symptoms.  Shailesh Brandt MD, MD  [facility]    Tu Etienne is a 68 year old, presenting for the following health issues:  Fall      11/6/2023     1:49 PM   Additional Questions   Roomed by Carlos LOPSE       Fall    History of Present Illness       Reason for visit:  Sternum  Symptom onset:  1-2 weeks ago    She eats 2-3 servings of fruits and vegetables daily.She consumes 0 sweetened beverage(s) daily.She exercises with enough effort to increase her heart rate 9 or less minutes per day.  She exercises with enough effort to increase her heart rate 3 or less days per week.   She is taking medications regularly.       Patient slipped on some water at a wedding dancing and struck her sternum against the top of a metal chair.  Knocked the wind out of her.  Now she has some pain in the left side of her sternum but she does not have any bruising.  Not having pain with deep inspiration.  She is Coumadin so she thought there would at least be some bruising.  She denies any shortness of breath.  She did do a lot of weekend chores all without significant discomfort.  Her friends just told her that since she is on a blood thinner she should be evaluated.  Pain History:  When did you first notice your pain? 10/28/23   Have you seen anyone else for your pain? No  How has your pain affected your ability to work? 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  Sternum and below shoulder blade.           Review of Systems   Constitutional, HEENT, cardiovascular, pulmonary, gi and gu systems are negative, except as otherwise noted.      Objective    /82   Pulse 75    "Resp 14   Ht 1.753 m (5' 9\")   Wt 79.8 kg (176 lb)   LMP  (LMP Unknown)   SpO2 97%   BMI 25.99 kg/m    Body mass index is 25.99 kg/m .  Physical Exam   GENERAL: healthy, alert and no distress  Patient has no swelling or bruising along either side of her sternum.  Slight tenderness to palpation on the upper inner aspect of her left breast.  Respirations are normal.                      "

## 2023-11-06 NOTE — LETTER
11/6/2023         RE: Tatiana Skelton  [id number] [address] [place] [id number]        Dear Colleague,    Thank you for referring your patient, Tatiana Skelton, to the [facility]. Please see a copy of my visit note below.    ENT Consultation    Tatiana Skelton who is a 68 year old female seen in consultation at the request of Dr. Douglas.      History of Present Illness - Tatiana Skelton is a 68 year old female recurrent ear infections   Patient with a long history of otologic issues.  She has had multiple sets of tubes in the past also multiple episodes of otitis externa.  She had tympanoplasty in the past.  She constantly currently gets itchy ears get plugged.  Not much comes out.  There has been no discharge.  She does wear hearing aids normally because of the itch she does not like wearing hearing aids.  Denies any new tinnitus vertigo or dizziness.        BP Readings from Last 1 Encounters:   11/06/23 120/82       BP noted to be well controlled today in office.     Tatiana IS NOT a smoker/uses chewing tobacco.     Past Medical History -   Past Medical History:   Diagnosis Date     Cervical high risk HPV (human papillomavirus) test positive 10/2/14, 3/6/16     Elevated cholesterol      Generalized osteoarthrosis, unspecified site 1/30/2015     Open angle with borderline findings, low risk 1/9/2014     Sick sinus syndrome (H)     PM implant since 2002     Syncope        Current Medications -   Current Outpatient Medications:      calcium carbonate (OS-SONIA) 500 MG tablet, Take 1 tablet by mouth 2 times daily, Disp: , Rfl:      cholecalciferol 25 MCG (1000 UT) TABS, , Disp: , Rfl:      furosemide (LASIX) 20 MG tablet, Take 2 tablets (40 mg) by mouth daily, Disp: 180 tablet, Rfl: 3     mometasone (ELOCON) 0.1 % external ointment, Apply sparingly to affected area twice daily as needed.  Do not apply to face., Disp: 45 g, Rfl: 1     rosuvastatin (CRESTOR) 40 MG tablet, Take 1 tablet  "(40 mg) by mouth daily, Disp: 90 tablet, Rfl: 0     warfarin ANTICOAGULANT (JANTOVEN ANTICOAGULANT) 5 MG tablet, TAKE 2 TABLETS BY MOUTH ON MONDAY AND THURSDAY AND TAKE 1.5 TABLETS ON ALL OTHER DAYS AS DIRECTED BY COUMADIN CLINIC, Disp: 140 tablet, Rfl: 1    Allergies -   Allergies   Allergen Reactions     Midazolam Hcl      Hypersensitive     Morphine Sulfate Anxiety       Social History -   Social History     Socioeconomic History     Marital status: Single   Tobacco Use     Smoking status: Never     Smokeless tobacco: Never     Tobacco comments:     Lives in smoke free household   Vaping Use     Vaping Use: Never used   Substance and Sexual Activity     Alcohol use: Yes     Comment: twice a month     Drug use: No     Sexual activity: Yes     Partners: Male     Birth control/protection: Surgical, Female Surgical     Comment: tubal ligation       Family History -   Family History   Problem Relation Age of Onset     Respiratory Mother 61        COPD     Heart Disease Mother      Eye Disorder Mother         cataract surgery     Lipids Mother      Diabetes Mother      Hypertension Mother      Eye Disorder Father      Glaucoma Father 80        takes drops, frequent apt - suspect it is glaucoma     Hypertension Sister      Cancer Sister      Uterine Cancer Sister      Lipids Sister      Lipids Brother      Hypertension Brother      Cerebrovascular Disease No family hx of      Thyroid Disease No family hx of      Macular Degeneration No family hx of        Review of Systems - As per HPI and PMHx, otherwise review of system review of the head and neck negative. Otherwise 10+ review of system is negative    Physical Exam  /82   Temp 97.2  F (36.2  C) (Tympanic)   Ht 1.753 m (5' 9\")   Wt 80.7 kg (178 lb)   LMP  (LMP Unknown)   BMI 26.29 kg/m    BMI: Body mass index is 26.29 kg/m .    General - The patient is well nourished and well developed, and appears to have good nutritional status.  Alert and oriented to " person and place, answers questions and cooperates with examination appropriately.    SKIN - No suspicious lesions or rashes.  Respiration - No respiratory distress.  Head and Face - Normocephalic and atraumatic, with no gross asymmetry noted of the contour of the facial features.  The facial nerve is intact, with strong symmetric movements.    Voice and Breathing - The patient was breathing comfortably without the use of accessory muscles. The patients voice was clear and strong, and had appropriate pitch and quality.    Ears - Bilateral pinna and EACs with normal appearing overlying skin. Tympanic membrane intact with good mobility on pneumatic otoscopy bilaterally. Bony landmarks of the ossicular chain are normal. The tympanic membranes are normal in appearance but somewhat thickened with some dry debris and skin on them. No retraction, perforation, or masses.  No fluid or purulence was seen in the external canal or the middle ear.   Dry debris also noted in each ear canal without occlusion.  Eyes - Extraocular movements intact.  Sclera were not icteric or injected, conjunctiva were pink and moist.      Neuro - Nonfocal neuro exam is normal, CN 2 through 12 intact, normal gait and muscle tone.      Performed in clinic today:  Audiologic Studies - An audiogram and tympanogram were performed today as part of the evaluation and personally reviewed. The tympanogram shows Type a curves on the right and Type a curves on the left, with normal canal volumes and middle ear pressures.  The audiogram showed mild to moderate SNHL on the right and mild to moderate SNHL on the left.      Excellent word recognition bilaterally with 100% on the right and 96% on the left.  A/P - Tatiana Skelton is a 68 year old female patient with mostly dryness in the ear canals some thickening of the drums but otherwise serviceable ears.  In regard to the dryness encouraged the patient to use either baby oil or extra virgin olive oil several  times a week.  Also will prescribe Diprolene cream to use as needed for itch daily.  Patient will follow-up with audiology for reprogramming of hearing aids.  We will check the hearing in a year.      Deandre aCstro MD       Again, thank you for allowing me to participate in the care of your patient.        Sincerely,        Deandre Castro MD, MD

## 2023-11-20 ENCOUNTER — MYC REFILL (OUTPATIENT)
Dept: FAMILY MEDICINE | Facility: CLINIC | Age: 68
End: 2023-11-20
Payer: COMMERCIAL

## 2023-11-20 DIAGNOSIS — I48.0 PAROXYSMAL ATRIAL FIBRILLATION (H): ICD-10-CM

## 2023-11-20 RX ORDER — WARFARIN SODIUM 5 MG/1
TABLET ORAL
Qty: 140 TABLET | Refills: 1 | Status: SHIPPED | OUTPATIENT
Start: 2023-11-20 | End: 2023-12-01

## 2023-11-20 NOTE — TELEPHONE ENCOUNTER
ANTICOAGULATION MANAGEMENT:  Medication Refill    Anticoagulation Summary  As of 11/6/2023      Warfarin maintenance plan:  10 mg (5 mg x 2) every Mon; 7.5 mg (5 mg x 1.5) all other days   Next INR check:  12/19/2023   Target end date:  Indefinite    Indications    Paroxysmal atrial fibrillation (H) [I48.0]                 Anticoagulation Care Providers       Provider Role Specialty Phone number    Doris Fraser APRN CNP Referring Cardiovascular Disease [phone]    Nicole Gray NP Referring Nurse Practitioner - Family [phone]            Refill Criteria    Visit with referring provider/group: Meets criteria: office visit within referring provider group in the last 1 year on 6/27/23    [facility] referral signed last signed: 01/04/2023; within last year: Yes    Lab monitoring not exceeding 2 weeks overdue: Yes    Tatiana meets all criteria for refill. Rx instructions and quantity supplied updated to match patient's current dosing plan. Warfarin 90 day supply with 1 refill granted per [facility] protocol     Lois Carpio RN  Anticoagulation Clinic

## 2023-11-28 ENCOUNTER — OFFICE VISIT (OUTPATIENT)
Dept: AUDIOLOGY | Facility: CLINIC | Age: 68
End: 2023-11-28
Payer: COMMERCIAL

## 2023-11-28 NOTE — PROGRESS NOTES
AUDIOLOGY REPORT    SUBJECTIVE:FRANKLIN Skelton is a 68 year old female who was seen in the Audiology Clinic at the [facility] on 11/28/2023  for hearing aid programming. Previous audiology evaluation 11/6/2023 indicated mild sloping to moderate sensorineural hearing loss bilaterally. She would like to have her Tonya i90 DELICIA hearing aids programmed for her current hearing levels.     OBJECTIVE:   Only one black programming boot was available, and there were issues finding the HiPro through [employer]. Needed to reschedule the programming appointment. Discussed [facility] is in network for [facility] [facility] and out of network with [facility].    No charge visit today (in warranty hearing aid check).    ASSESSMENT:   A follow-up appointment for hearing aid fitting was partially completed today. Changes to hearing aid was completed as outlined above.   PLAN:  Tatiana will return,  12.12.2023 for programming, I have her hearing aids . Please call this clinic with any questions regarding today s appointment.    Jared Alas Licensed Audiologist #[id number]

## 2023-11-30 DIAGNOSIS — I48.0 PAROXYSMAL ATRIAL FIBRILLATION (H): ICD-10-CM

## 2023-12-01 RX ORDER — WARFARIN SODIUM 5 MG/1
TABLET ORAL
Qty: 140 TABLET | Refills: 1 | Status: SHIPPED | OUTPATIENT
Start: 2023-12-01 | End: 2024-08-12

## 2023-12-01 NOTE — TELEPHONE ENCOUNTER
ANTICOAGULATION MANAGEMENT:  Medication Refill    Anticoagulation Summary  As of 11/6/2023      Warfarin maintenance plan:  10 mg (5 mg x 2) every Mon; 7.5 mg (5 mg x 1.5) all other days   Next INR check:  12/19/2023   Target end date:  Indefinite    Indications    Paroxysmal atrial fibrillation (H) [I48.0]                 Anticoagulation Care Providers       Provider Role Specialty Phone number    Doris Fraser APRN CNP Referring Cardiovascular Disease [phone]    Nicole Gray NP Referring Nurse Practitioner - Family [phone]            Refill Criteria    Visit with referring provider/group: Meets criteria: office visit within referring provider group in the last 1 year on 11/6/23    [facility] referral signed last signed: 01/04/2023; within last year: Yes    Lab monitoring not exceeding 2 weeks overdue: Yes    Tatiana meets all criteria for refill. Rx instructions and quantity supplied updated to match patient's current dosing plan. Warfarin 90 day supply with 1 refill granted per [facility] protocol     Lois Carpio RN  Anticoagulation Clinic

## 2023-12-12 ENCOUNTER — OFFICE VISIT (OUTPATIENT)
Dept: AUDIOLOGY | Facility: CLINIC | Age: 68
End: 2023-12-12

## 2023-12-12 ENCOUNTER — TRANSFERRED RECORDS (OUTPATIENT)
Dept: HEALTH INFORMATION MANAGEMENT | Facility: CLINIC | Age: 68
End: 2023-12-12

## 2023-12-12 DIAGNOSIS — H93.13 TINNITUS OF BOTH EARS: ICD-10-CM

## 2023-12-12 DIAGNOSIS — H90.3 SENSORINEURAL HEARING LOSS, BILATERAL: Primary | ICD-10-CM

## 2023-12-12 PROCEDURE — 92593 PR HEARING AID CHECK, BINAURAL: CPT | Mod: GA | Performed by: AUDIOLOGIST

## 2023-12-13 NOTE — PROGRESS NOTES
AUDIOLOGY REPORT    SUBJECTIVE:  Tatiana Skelton is a 68 year old female who was seen in the Audiology Clinic at the [facility] on 12/12/2023 for hearing aid programming. Previous evaluation 11/6/2023 showed mild sloping to moderate sensorineural hearing loss bilaterally. She has [employer] Series 3 i90 DELICIA hearing aids she would like programmed for her current hearing levels.     OBJECTIVE:   Real ear measures were obtained to verify output of the hearing aids, adjustments were made to match NAL-NL2 prescriptive speech targets based on the 11/6/2023 audio. She noted speech was balanced and comfortable but loud. Discussed adaptation to hearing aids. Also discussed use of the hearing aids should help make her tinnitus less noticeable.    ASSESSMENT:   A hearing aid programming appointment was completed today. Changes to hearing aid was completed as outlined above.     PLAN:  Tatiana will return for follow-up as needed,  depending on need for adjustments after programming changes . Please call this clinic with any questions regarding today s appointment.  She signed a notice of noncoverage for today.    Lili Alas.  [place] Licensed Audiologist #[id number]

## 2023-12-14 DIAGNOSIS — I49.5 SINUS NODE DYSFUNCTION (H): ICD-10-CM

## 2023-12-14 DIAGNOSIS — I25.10 CORONARY ARTERY DISEASE INVOLVING NATIVE CORONARY ARTERY OF NATIVE HEART WITHOUT ANGINA PECTORIS: ICD-10-CM

## 2023-12-14 DIAGNOSIS — E78.00 HYPERCHOLESTEROLEMIA: ICD-10-CM

## 2023-12-14 DIAGNOSIS — Z95.0 CARDIAC PACEMAKER IN SITU: ICD-10-CM

## 2023-12-14 RX ORDER — ROSUVASTATIN CALCIUM 40 MG/1
40 TABLET, COATED ORAL DAILY
Qty: 90 TABLET | Refills: 0 | Status: SHIPPED | OUTPATIENT
Start: 2023-12-14 | End: 2024-02-14

## 2023-12-19 ENCOUNTER — OFFICE VISIT (OUTPATIENT)
Dept: CARDIOLOGY | Facility: CLINIC | Age: 68
End: 2023-12-19
Payer: COMMERCIAL

## 2023-12-19 ENCOUNTER — ANTICOAGULATION THERAPY VISIT (OUTPATIENT)
Dept: ANTICOAGULATION | Facility: CLINIC | Age: 68
End: 2023-12-19

## 2023-12-19 ENCOUNTER — TELEPHONE (OUTPATIENT)
Dept: ANTICOAGULATION | Facility: CLINIC | Age: 68
End: 2023-12-19

## 2023-12-19 ENCOUNTER — LAB (OUTPATIENT)
Dept: LAB | Facility: CLINIC | Age: 68
End: 2023-12-19
Payer: COMMERCIAL

## 2023-12-19 VITALS
OXYGEN SATURATION: 94 % | WEIGHT: 178.5 LBS | HEIGHT: 69 IN | HEART RATE: 103 BPM | DIASTOLIC BLOOD PRESSURE: 64 MMHG | SYSTOLIC BLOOD PRESSURE: 106 MMHG | RESPIRATION RATE: 20 BRPM | BODY MASS INDEX: 26.44 KG/M2

## 2023-12-19 DIAGNOSIS — I48.0 PAROXYSMAL ATRIAL FIBRILLATION (H): Primary | ICD-10-CM

## 2023-12-19 DIAGNOSIS — I48.0 PAROXYSMAL ATRIAL FIBRILLATION (H): ICD-10-CM

## 2023-12-19 DIAGNOSIS — I49.5 SINUS NODE DYSFUNCTION (H): ICD-10-CM

## 2023-12-19 DIAGNOSIS — Z95.0 CARDIAC PACEMAKER IN SITU: ICD-10-CM

## 2023-12-19 DIAGNOSIS — I25.10 CORONARY ARTERY DISEASE INVOLVING NATIVE CORONARY ARTERY OF NATIVE HEART WITHOUT ANGINA PECTORIS: ICD-10-CM

## 2023-12-19 LAB
ANION GAP SERPL CALCULATED.3IONS-SCNC: 13 MMOL/L (ref 7–15)
BUN SERPL-MCNC: 18.7 MG/DL (ref 8–23)
CALCIUM SERPL-MCNC: 9.3 MG/DL (ref 8.8–10.2)
CHLORIDE SERPL-SCNC: 105 MMOL/L (ref 98–107)
CREAT SERPL-MCNC: 0.98 MG/DL (ref 0.51–0.95)
DEPRECATED HCO3 PLAS-SCNC: 24 MMOL/L (ref 22–29)
EGFRCR SERPLBLD CKD-EPI 2021: 63 ML/MIN/1.73M2
GLUCOSE SERPL-MCNC: 95 MG/DL (ref 70–99)
INR BLD: 2.5 (ref 0.9–1.1)
POTASSIUM SERPL-SCNC: 3.8 MMOL/L (ref 3.4–5.3)
SODIUM SERPL-SCNC: 142 MMOL/L (ref 135–145)

## 2023-12-19 PROCEDURE — 85610 PROTHROMBIN TIME: CPT

## 2023-12-19 PROCEDURE — 36416 COLLJ CAPILLARY BLOOD SPEC: CPT

## 2023-12-19 PROCEDURE — 99214 OFFICE O/P EST MOD 30 MIN: CPT | Performed by: INTERNAL MEDICINE

## 2023-12-19 PROCEDURE — 36415 COLL VENOUS BLD VENIPUNCTURE: CPT | Performed by: INTERNAL MEDICINE

## 2023-12-19 PROCEDURE — 80048 BASIC METABOLIC PNL TOTAL CA: CPT | Performed by: INTERNAL MEDICINE

## 2023-12-19 RX ORDER — MULTIVITAMIN WITH IRON
1 TABLET ORAL DAILY
COMMUNITY

## 2023-12-19 RX ORDER — TRIAMCINOLONE ACETONIDE 1 MG/ML
LOTION TOPICAL 2 TIMES DAILY
COMMUNITY
Start: 2023-09-19

## 2023-12-19 RX ORDER — KETOCONAZOLE 20 MG/ML
SHAMPOO TOPICAL DAILY PRN
COMMUNITY
Start: 2023-09-19

## 2023-12-19 NOTE — PROGRESS NOTES
ANTICOAGULATION MANAGEMENT     Tatiana Skelton 68 year old female is on warfarin with therapeutic INR result. (Goal INR 2.0-3.0)    Recent labs: (last 7 days)     12/19/23  1436   INR 2.5*       ASSESSMENT     Source(s): Chart Review and Patient/Caregiver Call     Warfarin doses taken: Warfarin taken as instructed  Diet: No new diet changes identified  Medication/supplement changes: None noted  New illness, injury, or hospitalization: some fluid build up and swelling on lasix seeing cardiology now no note yet  Signs or symptoms of bleeding or clotting: No  Previous result: Therapeutic last 2(+) visits  Additional findings: None       PLAN     Recommended plan for no diet, medication or health factor changes affecting INR     Dosing Instructions: Continue your current warfarin dose with next INR in 6 weeks       Summary  As of 12/19/2023      Full warfarin instructions:  10 mg every Mon; 7.5 mg all other days   Next INR check:  1/30/2024               Telephone call with Jaimie who verbalizes understanding and agrees to plan    Lab visit scheduled    Education provided:   Please call back if any changes to your diet, medications or how you've been taking warfarin    Plan made per [facility] anticoagulation protocol    Mago Chu RN  Anticoagulation Clinic  12/19/2023    _______________________________________________________________________     Anticoagulation Episode Summary       Current INR goal:  2.0-3.0   TTR:  79.9% (1 y)   Target end date:  Indefinite   Send INR reminders to:  LOU MEJIA    Indications    Paroxysmal atrial fibrillation (H) [I48.0]             Comments:               Anticoagulation Care Providers       Provider Role Specialty Phone number    Doris Fraser APRN CNP Referring Cardiovascular Disease [phone]    Nicole Gray NP Referring Nurse Practitioner - Family [phone]

## 2023-12-19 NOTE — LETTER
12/19/2023    Yadira Douglas MD  [address] [place] [address]    RE: Tatiana Skelton       Dear Colleague,     I had the pleasure of seeing Tatiana Skelton in the [facility].  HISTORY:    Tatiana Skelton is a pleasant 68-year-old female with a history of asymptomatic paroxysmal atrial fibrillation noted on her pacemaker, previous bradycardia with syncope resulting in placement of the permanent pacemaker in 2002 with generator replacement in 2012, coronary artery disease based on calcium scoring exam done in June 2021, hyperlipidemia, and venous insufficiency with previous vein procedures.  She is here primarily because of concerns about peripheral edema and this is a routine follow-up visit.    In general Tatiana reports that she is doing well.  She continues to experience bothersome peripheral edema which has not been well explained.  She had an echocardiogram done in May which I reviewed with her.  It showed a completely normal heart with normal chamber size and function and normal valve structure and function.  Normal right atrial pressures were also detected.  She has been using her Lasix as prescribed, 40 mg daily with rare use of 60 mg less than monthly.  She reports that she follows a very low salt diet, mostly doing her own home cooking without salt and watching labels carefully.  She has used compression stockings in the past but finds them uncomfortable at times.  We talked about keeping her legs elevated which she tries to do when she can but she is working 2 jobs mostly at a desk and has trouble doing this throughout the day.  We also talked about the importance of regular exercise.    Once again Tatiana reported that she is not aware of her atrial fibrillation.  Her last device check was done about 2 months ago and showed that she is ventricular paced less than 1% of the time and she had 19 mode switches the longest lasting 9 hours and 13 minutes.  She  is continuing to use warfarin for stroke prophylaxis.    Tatiana denies exertional chest pain, palpitations, PND/orthopnea, syncope or near syncope, or symptoms of claudication.  Other than her peripheral edema she expresses no cardiovascular concerns.      ASSESSMENT/PLAN:    1.  Paroxysmal atrial fibrillation.  Asymptomatic.  Infrequent.  Using warfarin for stroke prophylaxis, [id number] score of 3 for age, gender, and CAD.  2.  Lower extremity edema.  She has only superficial venous reflux on venous insufficiency studies.  The cause of her edema is unclear.  She has a normal echo with normal right heart filling pressures so this is not related to her heart.  Managed fairly successfully with diuresis although she complains that this makes her thirsty.  We discussed this in detail.  3.  Coronary artery disease with coronary calcification but negative stress testing.  Risk factors are being aggressively managed.  4.  Hyperlipidemia.  Lipid values are reviewed and are excellent, continue current medications.  5.  Remote history of bradycardia and syncope, resolved with pacemaker which is continue to function normally.  Battery status is still 6 years remaining.    Thank you for inviting me to participate in the care of your patient.  Please don't hesitate to call if I can be of further assistance.  35 minutes were spent today reviewing the chart and other records, interviewing and examining the patient, and documenting our visit.    Chart documentation was completed, in part, with [facility] voice-recognition software. Even though reviewed, some grammatical, spelling, and word errors may remain.       Orders Placed This Encounter   Procedures    Basic metabolic panel    Follow-Up with Cardiology ROBERT     Orders Placed This Encounter   Medications    magnesium 250 MG tablet     Sig: Take 1 tablet by mouth daily    ketoconazole (NIZORAL) 2 % external shampoo     Sig: Apply topically daily as needed    triamcinolone  (KENALOG) 0.1 % external lotion     Sig: Apply topically 2 times daily     There are no discontinued medications.    10 year ASCVD risk: The 10-year ASCVD risk score (Nitin NOWAK, et al., 2019) is: 3.6%    Values used to calculate the score:      Age: 68 years      Sex: Female      Is Non- : No      Diabetic: No      Tobacco smoker: No      Systolic Blood Pressure: 106 mmHg      Is BP treated: No      HDL Cholesterol: 84 mg/dL      Total Cholesterol: 188 mg/dL    Encounter Diagnoses   Name Primary?    Coronary artery disease involving native coronary artery of native heart without angina pectoris     Paroxysmal atrial fibrillation (H)     Sinus node dysfunction (H)     Cardiac pacemaker in situ        CURRENT MEDICATIONS:  Current Outpatient Medications   Medication Sig Dispense Refill    calcium carbonate (OS-SONIA) 500 MG tablet Take 1 tablet by mouth 2 times daily      cholecalciferol 25 MCG (1000 UT) TABS       furosemide (LASIX) 20 MG tablet Take 2 tablets (40 mg) by mouth daily 180 tablet 3    ketoconazole (NIZORAL) 2 % external shampoo Apply topically daily as needed      magnesium 250 MG tablet Take 1 tablet by mouth daily      mometasone (ELOCON) 0.1 % external cream Apply topically daily as needed (itch) 45 g 1    mometasone (ELOCON) 0.1 % external ointment Apply sparingly to affected area twice daily as needed.  Do not apply to face. 45 g 1    rosuvastatin (CRESTOR) 40 MG tablet Take 1 tablet (40 mg) by mouth daily 90 tablet 0    triamcinolone (KENALOG) 0.1 % external lotion Apply topically 2 times daily      warfarin ANTICOAGULANT (JANTOVEN ANTICOAGULANT) 5 MG tablet TAKE 2 TABLETS BY MOUTH ON MONDAY AND TAKE 1.5 TABLETS ON ALL OTHER DAYS AS DIRECTED BY COUMADIN CLINIC 140 tablet 1       ALLERGIES     Allergies   Allergen Reactions    Midazolam Hcl      Hypersensitive    Morphine Sulfate Anxiety       PAST MEDICAL HISTORY:  Past Medical History:   Diagnosis Date    Cervical high risk  HPV (human papillomavirus) test positive 10/2/14, 3/6/16    Elevated cholesterol     Generalized osteoarthrosis, unspecified site 1/30/2015    Open angle with borderline findings, low risk 1/9/2014    Sick sinus syndrome (H)     PM implant since 2002    Syncope        PAST SURGICAL HISTORY:  Past Surgical History:   Procedure Laterality Date    ABDOMEN SURGERY      Tubal    BUNIONECTOMY JACKIE BILATERAL      COLONOSCOPY N/A 08/20/2018    Normal, repeat 10 years    HC OR IMPLANT BREAST      IMPLANT IMPLANTABLE CARDIOVERTER DEFIBRILLATOR  06/04/2012    she does not have an ICD. She has a pacemker.    IMPLANT PACEMAKER  01/01/2002    Dual chamber medtronic for sick sinus snyndrome    TUBAL LIGATION  01/01/2000       FAMILY HISTORY:  Family History   Problem Relation Age of Onset    Respiratory Mother 61        COPD    Heart Disease Mother     Eye Disorder Mother         cataract surgery    Lipids Mother     Diabetes Mother     Hypertension Mother     Eye Disorder Father     Glaucoma Father 80        takes drops, frequent apt - suspect it is glaucoma    Hypertension Sister     Cancer Sister     Uterine Cancer Sister     Lipids Sister     Lipids Brother     Hypertension Brother     Cerebrovascular Disease No family hx of     Thyroid Disease No family hx of     Macular Degeneration No family hx of        SOCIAL HISTORY:  Social History     Socioeconomic History    Marital status: Single     Spouse name: None    Number of children: None    Years of education: None    Highest education level: None   Tobacco Use    Smoking status: Never    Smokeless tobacco: Never    Tobacco comments:     Lives in smoke free household   Vaping Use    Vaping Use: Never used   Substance and Sexual Activity    Alcohol use: Yes     Comment: twice a month    Drug use: No    Sexual activity: Yes     Partners: Male     Birth control/protection: Surgical, Female Surgical     Comment: tubal ligation     Social Determinants of Health     Financial  "Resource Strain: Low Risk  (11/6/2023)    Financial Resource Strain     Within the past 12 months, have you or your family members you live with been unable to get utilities (heat, electricity) when it was really needed?: No   Food Insecurity: Low Risk  (11/6/2023)    Food Insecurity     Within the past 12 months, did you worry that your food would run out before you got money to buy more?: No     Within the past 12 months, did the food you bought just not last and you didn t have money to get more?: No   Transportation Needs: Low Risk  (11/6/2023)    Transportation Needs     Within the past 12 months, has lack of transportation kept you from medical appointments, getting your medicines, non-medical meetings or appointments, work, or from getting things that you need?: No   Housing Stability: Low Risk  (11/6/2023)    Housing Stability     Do you have housing? : Yes     Are you worried about losing your housing?: No       Review of Systems:  Skin:        Eyes:       ENT:       Respiratory:  Positive for cough  Cardiovascular:  Negative;palpitations;chest pain;lightheadedness;dizziness;syncope or near-syncope Positive for;edema  Gastroenterology:      Genitourinary:       Musculoskeletal:       Neurologic:  Negative numbness or tingling of hands;numbness or tingling of feet  Psychiatric:       Heme/Lymph/Imm:  Positive for allergies  Endocrine:         Physical Exam:  Vitals: /64 (BP Location: Left arm, Patient Position: Sitting, Cuff Size: Adult Regular)   Pulse 103   Resp 20   Ht 1.753 m (5' 9\")   Wt 81 kg (178 lb 8 oz)   LMP  (LMP Unknown)   SpO2 94%   BMI 26.36 kg/m      Constitutional:           Skin:           Head:           Eyes:           ENT:           Neck:           Chest:           Cardiac:                    Abdomen:           Vascular:                                        Extremities and Muscular Skeletal:              Neurological:           Psych:        Recent Lab Results:  LIPID " "RESULTS:  Lab Results   Component Value Date    CHOL 188 04/25/2023    CHOL 214 (H) 12/24/2018    HDL 84 04/25/2023    HDL 94 12/24/2018    LDL 72 04/25/2023     (H) 12/24/2018    TRIG 160 (H) 04/25/2023    TRIG 74 12/24/2018    CHOLHDLRATIO 2.1 08/25/2014       LIVER ENZYME RESULTS:  Lab Results   Component Value Date    AST 15 01/30/2015    ALT 24 04/25/2023    ALT 17 01/30/2015       CBC RESULTS:  Lab Results   Component Value Date    WBC 4.3 01/30/2015    RBC 4.62 01/30/2015    HGB 14.3 01/30/2015    HCT 40.4 01/30/2015    MCV 87 01/30/2015    MCH 31.0 01/30/2015    MCHC 35.4 01/30/2015    RDW 13.6 01/30/2015     01/30/2015       BMP RESULTS:  Lab Results   Component Value Date     12/19/2023     04/07/2016    POTASSIUM 3.8 12/19/2023    POTASSIUM 3.8 04/07/2016    CHLORIDE 105 12/19/2023    CHLORIDE 106 04/07/2016    CO2 24 12/19/2023    CO2 29 04/07/2016    ANIONGAP 13 12/19/2023    ANIONGAP 9 04/07/2016    GLC 95 12/19/2023    GLC 88 12/24/2018    BUN 18.7 12/19/2023    BUN 19 04/07/2016    CR 0.98 (H) 12/19/2023    CR 0.78 04/07/2016    GFRESTIMATED 63 12/19/2023    GFRESTIMATED 75 04/07/2016    GFRESTBLACK >90   GFR Calc   04/07/2016    SONIA 9.3 12/19/2023    SONIA 9.7 04/07/2016        A1C RESULTS:  No results found for: \"A1C\"    INR RESULTS:  Lab Results   Component Value Date    INR 2.5 (H) 12/19/2023    INR 2.3 (H) 11/06/2023         Isaac Blair MD, FACC    CC  Doris Fraser, APRN CNP  [address]  [place] [address]      Thank you for allowing me to participate in the care of your patient.      Sincerely,     Isaac Blair MD     [facility]  "

## 2023-12-19 NOTE — TELEPHONE ENCOUNTER
ANTICOAGULATION CLINIC REFERRAL RENEWAL REQUEST       An annual renewal order is required for all patients referred to [facility].?  Please review and sign the pended referral order for Tatiana Skelton.       ANTICOAGULATION SUMMARY      Warfarin indication(s)   Atrial Fibrillation    Mechanical heart valve present?  NO       Current goal range   INR: 2.0-3.0     Goal appropriate for indication? Goal INR 2-3, standard for indication(s) above     Time in Therapeutic Range (TTR)  (Goal > 60%) 79%       Office visit with referring provider's group within last year yes on 11/6/2023       Mago Chu RN  [facility]

## 2023-12-19 NOTE — PROGRESS NOTES
HISTORY:    Tatiana Skelton is a pleasant 68-year-old female with a history of asymptomatic paroxysmal atrial fibrillation noted on her pacemaker, previous bradycardia with syncope resulting in placement of the permanent pacemaker in 2002 with generator replacement in 2012, coronary artery disease based on calcium scoring exam done in June 2021, hyperlipidemia, and venous insufficiency with previous vein procedures.  She is here primarily because of concerns about peripheral edema and this is a routine follow-up visit.    In general Tatiana reports that she is doing well.  She continues to experience bothersome peripheral edema which has not been well explained.  She had an echocardiogram done in May which I reviewed with her.  It showed a completely normal heart with normal chamber size and function and normal valve structure and function.  Normal right atrial pressures were also detected.  She has been using her Lasix as prescribed, 40 mg daily with rare use of 60 mg less than monthly.  She reports that she follows a very low salt diet, mostly doing her own home cooking without salt and watching labels carefully.  She has used compression stockings in the past but finds them uncomfortable at times.  We talked about keeping her legs elevated which she tries to do when she can but she is working 2 jobs mostly at a desk and has trouble doing this throughout the day.  We also talked about the importance of regular exercise.    Once again Tatiana reported that she is not aware of her atrial fibrillation.  Her last device check was done about 2 months ago and showed that she is ventricular paced less than 1% of the time and she had 19 mode switches the longest lasting 9 hours and 13 minutes.  She is continuing to use warfarin for stroke prophylaxis.    Tatiana denies exertional chest pain, palpitations, PND/orthopnea, syncope or near syncope, or symptoms of claudication.  Other than her peripheral edema she  expresses no cardiovascular concerns.      ASSESSMENT/PLAN:    1.  Paroxysmal atrial fibrillation.  Asymptomatic.  Infrequent.  Using warfarin for stroke prophylaxis, [id number] score of 3 for age, gender, and CAD.  2.  Lower extremity edema.  She has only superficial venous reflux on venous insufficiency studies.  The cause of her edema is unclear.  She has a normal echo with normal right heart filling pressures so this is not related to her heart.  Managed fairly successfully with diuresis although she complains that this makes her thirsty.  We discussed this in detail.  3.  Coronary artery disease with coronary calcification but negative stress testing.  Risk factors are being aggressively managed.  4.  Hyperlipidemia.  Lipid values are reviewed and are excellent, continue current medications.  5.  Remote history of bradycardia and syncope, resolved with pacemaker which is continue to function normally.  Battery status is still 6 years remaining.    Thank you for inviting me to participate in the care of your patient.  Please don't hesitate to call if I can be of further assistance.  35 minutes were spent today reviewing the chart and other records, interviewing and examining the patient, and documenting our visit.    Chart documentation was completed, in part, with [employer] voice-recognition software. Even though reviewed, some grammatical, spelling, and word errors may remain.       Orders Placed This Encounter   Procedures    Basic metabolic panel    Follow-Up with Cardiology ROBERT     Orders Placed This Encounter   Medications    magnesium 250 MG tablet     Sig: Take 1 tablet by mouth daily    ketoconazole (NIZORAL) 2 % external shampoo     Sig: Apply topically daily as needed    triamcinolone (KENALOG) 0.1 % external lotion     Sig: Apply topically 2 times daily     There are no discontinued medications.    10 year ASCVD risk: The 10-year ASCVD risk score (Nitin NOWAK, et al., 2019) is: 3.6%    Values used to  calculate the score:      Age: 68 years      Sex: Female      Is Non- : No      Diabetic: No      Tobacco smoker: No      Systolic Blood Pressure: 106 mmHg      Is BP treated: No      HDL Cholesterol: 84 mg/dL      Total Cholesterol: 188 mg/dL    Encounter Diagnoses   Name Primary?    Coronary artery disease involving native coronary artery of native heart without angina pectoris     Paroxysmal atrial fibrillation (H)     Sinus node dysfunction (H)     Cardiac pacemaker in situ        CURRENT MEDICATIONS:  Current Outpatient Medications   Medication Sig Dispense Refill    calcium carbonate (OS-OSNIA) 500 MG tablet Take 1 tablet by mouth 2 times daily      cholecalciferol 25 MCG (1000 UT) TABS       furosemide (LASIX) 20 MG tablet Take 2 tablets (40 mg) by mouth daily 180 tablet 3    ketoconazole (NIZORAL) 2 % external shampoo Apply topically daily as needed      magnesium 250 MG tablet Take 1 tablet by mouth daily      mometasone (ELOCON) 0.1 % external cream Apply topically daily as needed (itch) 45 g 1    mometasone (ELOCON) 0.1 % external ointment Apply sparingly to affected area twice daily as needed.  Do not apply to face. 45 g 1    rosuvastatin (CRESTOR) 40 MG tablet Take 1 tablet (40 mg) by mouth daily 90 tablet 0    triamcinolone (KENALOG) 0.1 % external lotion Apply topically 2 times daily      warfarin ANTICOAGULANT (JANTOVEN ANTICOAGULANT) 5 MG tablet TAKE 2 TABLETS BY MOUTH ON MONDAY AND TAKE 1.5 TABLETS ON ALL OTHER DAYS AS DIRECTED BY COUMADIN CLINIC 140 tablet 1       ALLERGIES     Allergies   Allergen Reactions    Midazolam Hcl      Hypersensitive    Morphine Sulfate Anxiety       PAST MEDICAL HISTORY:  Past Medical History:   Diagnosis Date    Cervical high risk HPV (human papillomavirus) test positive 10/2/14, 3/6/16    Elevated cholesterol     Generalized osteoarthrosis, unspecified site 1/30/2015    Open angle with borderline findings, low risk 1/9/2014    Sick sinus  syndrome (H)     PM implant since 2002    Syncope        PAST SURGICAL HISTORY:  Past Surgical History:   Procedure Laterality Date    ABDOMEN SURGERY      Tubal    BUNIONECTOMY JACKIE BILATERAL      COLONOSCOPY N/A 08/20/2018    Normal, repeat 10 years    HC OR IMPLANT BREAST      IMPLANT IMPLANTABLE CARDIOVERTER DEFIBRILLATOR  06/04/2012    she does not have an ICD. She has a pacemker.    IMPLANT PACEMAKER  01/01/2002    Dual chamber medtronic for sick sinus snyndrome    TUBAL LIGATION  01/01/2000       FAMILY HISTORY:  Family History   Problem Relation Age of Onset    Respiratory Mother 61        COPD    Heart Disease Mother     Eye Disorder Mother         cataract surgery    Lipids Mother     Diabetes Mother     Hypertension Mother     Eye Disorder Father     Glaucoma Father 80        takes drops, frequent apt - suspect it is glaucoma    Hypertension Sister     Cancer Sister     Uterine Cancer Sister     Lipids Sister     Lipids Brother     Hypertension Brother     Cerebrovascular Disease No family hx of     Thyroid Disease No family hx of     Macular Degeneration No family hx of        SOCIAL HISTORY:  Social History     Socioeconomic History    Marital status: Single     Spouse name: None    Number of children: None    Years of education: None    Highest education level: None   Tobacco Use    Smoking status: Never    Smokeless tobacco: Never    Tobacco comments:     Lives in smoke free household   Vaping Use    Vaping Use: Never used   Substance and Sexual Activity    Alcohol use: Yes     Comment: twice a month    Drug use: No    Sexual activity: Yes     Partners: Male     Birth control/protection: Surgical, Female Surgical     Comment: tubal ligation     Social Determinants of Health     Financial Resource Strain: Low Risk  (11/6/2023)    Financial Resource Strain     Within the past 12 months, have you or your family members you live with been unable to get utilities (heat, electricity) when it was really  "needed?: No   Food Insecurity: Low Risk  (11/6/2023)    Food Insecurity     Within the past 12 months, did you worry that your food would run out before you got money to buy more?: No     Within the past 12 months, did the food you bought just not last and you didn t have money to get more?: No   Transportation Needs: Low Risk  (11/6/2023)    Transportation Needs     Within the past 12 months, has lack of transportation kept you from medical appointments, getting your medicines, non-medical meetings or appointments, work, or from getting things that you need?: No   Housing Stability: Low Risk  (11/6/2023)    Housing Stability     Do you have housing? : Yes     Are you worried about losing your housing?: No       Review of Systems:  Skin:        Eyes:       ENT:       Respiratory:  Positive for cough  Cardiovascular:  Negative;palpitations;chest pain;lightheadedness;dizziness;syncope or near-syncope Positive for;edema  Gastroenterology:      Genitourinary:       Musculoskeletal:       Neurologic:  Negative numbness or tingling of hands;numbness or tingling of feet  Psychiatric:       Heme/Lymph/Imm:  Positive for allergies  Endocrine:         Physical Exam:  Vitals: /64 (BP Location: Left arm, Patient Position: Sitting, Cuff Size: Adult Regular)   Pulse 103   Resp 20   Ht 1.753 m (5' 9\")   Wt 81 kg (178 lb 8 oz)   LMP  (LMP Unknown)   SpO2 94%   BMI 26.36 kg/m      Constitutional:           Skin:           Head:           Eyes:           ENT:           Neck:           Chest:           Cardiac:                    Abdomen:           Vascular:                                        Extremities and Muscular Skeletal:              Neurological:           Psych:        Recent Lab Results:  LIPID RESULTS:  Lab Results   Component Value Date    CHOL 188 04/25/2023    CHOL 214 (H) 12/24/2018    HDL 84 04/25/2023    HDL 94 12/24/2018    LDL 72 04/25/2023     (H) 12/24/2018    TRIG 160 (H) 04/25/2023    TRIG " "74 12/24/2018    CHOLHDLRATIO 2.1 08/25/2014       LIVER ENZYME RESULTS:  Lab Results   Component Value Date    AST 15 01/30/2015    ALT 24 04/25/2023    ALT 17 01/30/2015       CBC RESULTS:  Lab Results   Component Value Date    WBC 4.3 01/30/2015    RBC 4.62 01/30/2015    HGB 14.3 01/30/2015    HCT 40.4 01/30/2015    MCV 87 01/30/2015    MCH 31.0 01/30/2015    MCHC 35.4 01/30/2015    RDW 13.6 01/30/2015     01/30/2015       BMP RESULTS:  Lab Results   Component Value Date     12/19/2023     04/07/2016    POTASSIUM 3.8 12/19/2023    POTASSIUM 3.8 04/07/2016    CHLORIDE 105 12/19/2023    CHLORIDE 106 04/07/2016    CO2 24 12/19/2023    CO2 29 04/07/2016    ANIONGAP 13 12/19/2023    ANIONGAP 9 04/07/2016    GLC 95 12/19/2023    GLC 88 12/24/2018    BUN 18.7 12/19/2023    BUN 19 04/07/2016    CR 0.98 (H) 12/19/2023    CR 0.78 04/07/2016    GFRESTIMATED 63 12/19/2023    GFRESTIMATED 75 04/07/2016    GFRESTBLACK >90   GFR Calc   04/07/2016    SONIA 9.3 12/19/2023    SONIA 9.7 04/07/2016        A1C RESULTS:  No results found for: \"A1C\"    INR RESULTS:  Lab Results   Component Value Date    INR 2.5 (H) 12/19/2023    INR 2.3 (H) 11/06/2023         Isaac Blair MD, FACC    CC  Doris Fraser, APRN CNP  [address]  [place] [address]                  "

## 2024-02-05 ENCOUNTER — ANTICOAGULATION THERAPY VISIT (OUTPATIENT)
Dept: ANTICOAGULATION | Facility: CLINIC | Age: 69
End: 2024-02-05

## 2024-02-05 ENCOUNTER — LAB (OUTPATIENT)
Dept: LAB | Facility: CLINIC | Age: 69
End: 2024-02-05
Payer: COMMERCIAL

## 2024-02-05 DIAGNOSIS — I48.0 PAROXYSMAL ATRIAL FIBRILLATION (H): Primary | ICD-10-CM

## 2024-02-05 DIAGNOSIS — I48.0 PAROXYSMAL ATRIAL FIBRILLATION (H): ICD-10-CM

## 2024-02-05 LAB — INR BLD: 2.5 (ref 0.9–1.1)

## 2024-02-05 PROCEDURE — 85610 PROTHROMBIN TIME: CPT

## 2024-02-05 PROCEDURE — 36416 COLLJ CAPILLARY BLOOD SPEC: CPT

## 2024-02-05 NOTE — PROGRESS NOTES
ANTICOAGULATION MANAGEMENT     Tatiana Skelton 68 year old female is on warfarin with therapeutic INR result. (Goal INR 2.0-3.0)    Recent labs: (last 7 days)     02/05/24  1620   INR 2.5*       ASSESSMENT     Source(s): Chart Review and Patient/Caregiver Call     Warfarin doses taken: Warfarin taken as instructed  Diet: No new diet changes identified  Medication/supplement changes: None noted  New illness, injury, or hospitalization: No  Signs or symptoms of bleeding or clotting: No  Previous result: Therapeutic last 2(+) visits  Additional findings: None       PLAN     Recommended plan for no diet, medication or health factor changes affecting INR     Dosing Instructions: Continue your current warfarin dose with next INR in 6 weeks       Summary  As of 2/5/2024      Full warfarin instructions:  10 mg every Mon; 7.5 mg all other days   Next INR check:  3/18/2024               Telephone call with Jaimie who verbalizes understanding and agrees to plan and who agrees to plan and repeated back plan correctly    Patient offered & declined to schedule next visit    Education provided:   Contact [phone]  with any changes, questions or concerns.     Plan made per [facility] anticoagulation protocol    Lois Carpio RN  Anticoagulation Clinic  2/5/2024    _______________________________________________________________________     Anticoagulation Episode Summary       Current INR goal:  2.0-3.0   TTR:  87.5% (1 y)   Target end date:  Indefinite   Send INR reminders to:  [facility]    Indications    Paroxysmal atrial fibrillation (H) [I48.0]             Comments:               Anticoagulation Care Providers       Provider Role Specialty Phone number    Doris Fraser APRN CNP Referring Cardiovascular Disease [phone]    Nicole Gray, NP Referring Nurse Practitioner - Family [phone]    Yadira Douglas MD Referring Family Medicine [phone]

## 2024-02-14 DIAGNOSIS — I49.5 SINUS NODE DYSFUNCTION (H): ICD-10-CM

## 2024-02-14 DIAGNOSIS — E78.00 HYPERCHOLESTEROLEMIA: ICD-10-CM

## 2024-02-14 DIAGNOSIS — I25.10 CORONARY ARTERY DISEASE INVOLVING NATIVE CORONARY ARTERY OF NATIVE HEART WITHOUT ANGINA PECTORIS: ICD-10-CM

## 2024-02-14 DIAGNOSIS — Z95.0 CARDIAC PACEMAKER IN SITU: ICD-10-CM

## 2024-02-14 RX ORDER — ROSUVASTATIN CALCIUM 40 MG/1
40 TABLET, COATED ORAL DAILY
Qty: 90 TABLET | Refills: 3 | Status: SHIPPED | OUTPATIENT
Start: 2024-02-14

## 2024-03-25 ENCOUNTER — TELEPHONE (OUTPATIENT)
Dept: ANTICOAGULATION | Facility: CLINIC | Age: 69
End: 2024-03-25
Payer: COMMERCIAL

## 2024-03-25 NOTE — TELEPHONE ENCOUNTER
ANTICOAGULATION     Tatiana Skelton is overdue for an INR check.     Spoke with Jaimie and scheduled lab appointment on 4/4/24    Lois Carpio RN

## 2024-04-03 ENCOUNTER — LAB (OUTPATIENT)
Dept: LAB | Facility: CLINIC | Age: 69
End: 2024-04-03
Payer: COMMERCIAL

## 2024-04-03 ENCOUNTER — ANTICOAGULATION THERAPY VISIT (OUTPATIENT)
Dept: ANTICOAGULATION | Facility: CLINIC | Age: 69
End: 2024-04-03

## 2024-04-03 DIAGNOSIS — I48.0 PAROXYSMAL ATRIAL FIBRILLATION (H): Primary | ICD-10-CM

## 2024-04-03 DIAGNOSIS — I48.0 PAROXYSMAL ATRIAL FIBRILLATION (H): ICD-10-CM

## 2024-04-03 LAB — INR BLD: 2.1 (ref 0.9–1.1)

## 2024-04-03 PROCEDURE — 36416 COLLJ CAPILLARY BLOOD SPEC: CPT

## 2024-04-03 PROCEDURE — 85610 PROTHROMBIN TIME: CPT

## 2024-04-03 NOTE — PROGRESS NOTES
ANTICOAGULATION MANAGEMENT     Tatiana Skelton 68 year old female is on warfarin with therapeutic INR result. (Goal INR 2.0-3.0)    Recent labs: (last 7 days)     04/03/24  1548   INR 2.1*       ASSESSMENT     Source(s): Chart Review and Patient/Caregiver Call     Warfarin doses taken: Warfarin taken as instructed  Diet: No new diet changes identified  Medication/supplement changes: None noted  New illness, injury, or hospitalization: No  Signs or symptoms of bleeding or clotting: No  Previous result: Therapeutic last 2(+) visits  Additional findings: None       PLAN     Recommended plan for no diet, medication or health factor changes affecting INR     Dosing Instructions: Continue your current warfarin dose with next INR in 6 weeks       Summary  As of 4/3/2024      Full warfarin instructions:  10 mg every Mon; 7.5 mg all other days   Next INR check:  5/15/2024               Detailed voice message left for Jaimie with dosing instructions and follow up date.     Contact [phone]  to schedule and with any changes, questions or concerns.     Education provided:   Please call back if any changes to your diet, medications or how you've been taking warfarin    Plan made per [facility] anticoagulation protocol    Jacque Koehler, KELLY  Anticoagulation Clinic  4/3/2024    _______________________________________________________________________     Anticoagulation Episode Summary       Current INR goal:  2.0-3.0   TTR:  94.6% (1 y)   Target end date:  Indefinite   Send INR reminders to:  [facility]    Indications    Paroxysmal atrial fibrillation (H) [I48.0]             Comments:               Anticoagulation Care Providers       Provider Role Specialty Phone number    Doris Fraser APRN CNP Referring Cardiovascular Disease [phone]    Nicole Gray NP Referring Nurse Practitioner - Family [phone]    Yadira Douglas MD Referring Family Medicine [phone]

## 2024-04-05 ENCOUNTER — ANCILLARY PROCEDURE (OUTPATIENT)
Dept: CARDIOLOGY | Facility: CLINIC | Age: 69
End: 2024-04-05
Attending: INTERNAL MEDICINE
Payer: COMMERCIAL

## 2024-04-05 DIAGNOSIS — Z95.0 CARDIAC PACEMAKER IN SITU: ICD-10-CM

## 2024-04-05 DIAGNOSIS — I49.5 SINUS NODE DYSFUNCTION (H): ICD-10-CM

## 2024-04-05 DIAGNOSIS — I49.5 SINUS NODE DYSFUNCTION (H): Primary | ICD-10-CM

## 2024-04-05 PROCEDURE — 93296 REM INTERROG EVL PM/IDS: CPT | Performed by: INTERNAL MEDICINE

## 2024-04-05 PROCEDURE — 93294 REM INTERROG EVL PM/LDLS PM: CPT | Performed by: INTERNAL MEDICINE

## 2024-04-08 LAB
MDC_IDC_LEAD_CONNECTION_STATUS: NORMAL
MDC_IDC_LEAD_CONNECTION_STATUS: NORMAL
MDC_IDC_LEAD_IMPLANT_DT: NORMAL
MDC_IDC_LEAD_IMPLANT_DT: NORMAL
MDC_IDC_LEAD_LOCATION: NORMAL
MDC_IDC_LEAD_LOCATION: NORMAL
MDC_IDC_LEAD_LOCATION_DETAIL_1: NORMAL
MDC_IDC_LEAD_LOCATION_DETAIL_1: NORMAL
MDC_IDC_LEAD_MFG: NORMAL
MDC_IDC_LEAD_MFG: NORMAL
MDC_IDC_LEAD_MODEL: NORMAL
MDC_IDC_LEAD_MODEL: NORMAL
MDC_IDC_LEAD_POLARITY_TYPE: NORMAL
MDC_IDC_LEAD_POLARITY_TYPE: NORMAL
MDC_IDC_LEAD_SERIAL: NORMAL
MDC_IDC_LEAD_SERIAL: NORMAL
MDC_IDC_LEAD_SPECIAL_FUNCTION: NORMAL
MDC_IDC_MSMT_BATTERY_DTM: NORMAL
MDC_IDC_MSMT_BATTERY_IMPEDANCE: 1275 OHM
MDC_IDC_MSMT_BATTERY_REMAINING_LONGEVITY: 67 MO
MDC_IDC_MSMT_BATTERY_STATUS: NORMAL
MDC_IDC_MSMT_BATTERY_VOLTAGE: 2.77 V
MDC_IDC_MSMT_LEADCHNL_RA_IMPEDANCE_VALUE: 465 OHM
MDC_IDC_MSMT_LEADCHNL_RA_PACING_THRESHOLD_AMPLITUDE: 0.62 V
MDC_IDC_MSMT_LEADCHNL_RA_PACING_THRESHOLD_PULSEWIDTH: 0.4 MS
MDC_IDC_MSMT_LEADCHNL_RV_IMPEDANCE_VALUE: 712 OHM
MDC_IDC_MSMT_LEADCHNL_RV_PACING_THRESHOLD_AMPLITUDE: 1.12 V
MDC_IDC_MSMT_LEADCHNL_RV_PACING_THRESHOLD_PULSEWIDTH: 0.4 MS
MDC_IDC_PG_IMPLANT_DTM: NORMAL
MDC_IDC_PG_MFG: NORMAL
MDC_IDC_PG_MODEL: NORMAL
MDC_IDC_PG_SERIAL: NORMAL
MDC_IDC_PG_TYPE: NORMAL
MDC_IDC_SESS_CLINIC_NAME: NORMAL
MDC_IDC_SESS_DTM: NORMAL
MDC_IDC_SESS_TYPE: NORMAL
MDC_IDC_SET_BRADY_AT_MODE_SWITCH_MODE: NORMAL
MDC_IDC_SET_BRADY_AT_MODE_SWITCH_RATE: 175 {BEATS}/MIN
MDC_IDC_SET_BRADY_LOWRATE: 50 {BEATS}/MIN
MDC_IDC_SET_BRADY_MAX_SENSOR_RATE: 130 {BEATS}/MIN
MDC_IDC_SET_BRADY_MAX_TRACKING_RATE: 130 {BEATS}/MIN
MDC_IDC_SET_BRADY_MODE: NORMAL
MDC_IDC_SET_BRADY_PAV_DELAY_LOW: 150 MS
MDC_IDC_SET_BRADY_SAV_DELAY_LOW: 120 MS
MDC_IDC_SET_LEADCHNL_RA_PACING_AMPLITUDE: 1.5 V
MDC_IDC_SET_LEADCHNL_RA_PACING_CAPTURE_MODE: NORMAL
MDC_IDC_SET_LEADCHNL_RA_PACING_POLARITY: NORMAL
MDC_IDC_SET_LEADCHNL_RA_PACING_PULSEWIDTH: 0.4 MS
MDC_IDC_SET_LEADCHNL_RA_SENSING_POLARITY: NORMAL
MDC_IDC_SET_LEADCHNL_RA_SENSING_SENSITIVITY: 0.5 MV
MDC_IDC_SET_LEADCHNL_RV_PACING_AMPLITUDE: 2.25 V
MDC_IDC_SET_LEADCHNL_RV_PACING_CAPTURE_MODE: NORMAL
MDC_IDC_SET_LEADCHNL_RV_PACING_POLARITY: NORMAL
MDC_IDC_SET_LEADCHNL_RV_PACING_PULSEWIDTH: 0.4 MS
MDC_IDC_SET_LEADCHNL_RV_SENSING_POLARITY: NORMAL
MDC_IDC_SET_LEADCHNL_RV_SENSING_SENSITIVITY: 2.8 MV
MDC_IDC_SET_ZONE_DETECTION_INTERVAL: 333.33 MS
MDC_IDC_SET_ZONE_DETECTION_INTERVAL: 342.86 MS
MDC_IDC_SET_ZONE_STATUS: NORMAL
MDC_IDC_SET_ZONE_STATUS: NORMAL
MDC_IDC_SET_ZONE_TYPE: NORMAL
MDC_IDC_SET_ZONE_TYPE: NORMAL
MDC_IDC_SET_ZONE_VENDOR_TYPE: NORMAL
MDC_IDC_SET_ZONE_VENDOR_TYPE: NORMAL
MDC_IDC_STAT_AT_BURDEN_PERCENT: 0.1 %
MDC_IDC_STAT_AT_DTM_END: NORMAL
MDC_IDC_STAT_AT_DTM_START: NORMAL
MDC_IDC_STAT_AT_MODE_SW_COUNT: 43
MDC_IDC_STAT_BRADY_AP_VP_PERCENT: 0 %
MDC_IDC_STAT_BRADY_AP_VS_PERCENT: 3 %
MDC_IDC_STAT_BRADY_AS_VP_PERCENT: 0 %
MDC_IDC_STAT_BRADY_AS_VS_PERCENT: 97 %
MDC_IDC_STAT_BRADY_DTM_END: NORMAL
MDC_IDC_STAT_BRADY_DTM_START: NORMAL
MDC_IDC_STAT_EPISODE_RECENT_COUNT: 1
MDC_IDC_STAT_EPISODE_RECENT_COUNT: 2
MDC_IDC_STAT_EPISODE_RECENT_COUNT_DTM_END: NORMAL
MDC_IDC_STAT_EPISODE_RECENT_COUNT_DTM_END: NORMAL
MDC_IDC_STAT_EPISODE_RECENT_COUNT_DTM_START: NORMAL
MDC_IDC_STAT_EPISODE_RECENT_COUNT_DTM_START: NORMAL
MDC_IDC_STAT_EPISODE_TYPE: NORMAL
MDC_IDC_STAT_EPISODE_TYPE: NORMAL

## 2024-05-08 DIAGNOSIS — R60.0 LOWER EXTREMITY EDEMA: ICD-10-CM

## 2024-05-08 RX ORDER — FUROSEMIDE 20 MG
40 TABLET ORAL DAILY
Qty: 180 TABLET | Refills: 2 | Status: SHIPPED | OUTPATIENT
Start: 2024-05-08

## 2024-05-20 NOTE — TELEPHONE ENCOUNTER
Routing refill request to provider for review/approval because:  Labs not current:  NA+, K+, CREAT  Medication is reported/historical    Harley Alvarez RN     (M6) obeys commands

## 2024-05-22 ENCOUNTER — TELEPHONE (OUTPATIENT)
Dept: ANTICOAGULATION | Facility: CLINIC | Age: 69
End: 2024-05-22
Payer: COMMERCIAL

## 2024-05-22 NOTE — TELEPHONE ENCOUNTER
ANTICOAGULATION     Tatiana Skelton is overdue for an INR check.     Left message for patient to call and schedule lab appointment as soon as possible. If returning call, please schedule.     Paty Harris RN

## 2024-05-28 ENCOUNTER — TELEPHONE (OUTPATIENT)
Dept: FAMILY MEDICINE | Facility: CLINIC | Age: 69
End: 2024-05-28
Payer: COMMERCIAL

## 2024-05-28 NOTE — TELEPHONE ENCOUNTER
States is unable to get inr at this time she is busy with a son that is in ICU and will get in when she can.  Guillermina Ocampo Rn  [facility]

## 2024-05-28 NOTE — TELEPHONE ENCOUNTER
FYI - Status Update    Who is Calling: patient    Update: patient has not been able to scheule appointment due to busy schedule with son being in ICU. Would like someone to call her after 430 pm    Does caller want a call/response back: Yes     Could we send this information to you in [employer] or would you prefer to receive a phone call?:   Patient would prefer a phone call   Okay to leave a detailed message?: Yes at Home number on file [phone] (home)

## 2024-06-03 ENCOUNTER — TELEPHONE (OUTPATIENT)
Dept: FAMILY MEDICINE | Facility: CLINIC | Age: 69
End: 2024-06-03
Payer: COMMERCIAL

## 2024-06-03 NOTE — TELEPHONE ENCOUNTER
Reason for Call:  Appointment Request    Patient requesting this type of appt:  INR LAB    Requested provider:   lab    Reason patient unable to be scheduled: Not within requested timeframe    When does patient want to be seen/preferred time:  06/04/24    Comments: Pt states she was told she could come in at 4:45 pm for her INR lab at  by an INR RN. Writer informed pt that the schedule does not go out that late. Pt then stated she had to go back to work and didn't have time to discuss this. Please let pt know if this accomodation can be made.    Could we send this information to you in [employer] or would you prefer to receive a phone call?:   Patient would prefer a phone call   Okay to leave a detailed message?: No at Cell number on file:    Telephone Information:   Mobile [phone]       Call taken on 6/3/2024 at 1:54 PM by Dian Mayer

## 2024-06-06 ENCOUNTER — ANTICOAGULATION THERAPY VISIT (OUTPATIENT)
Dept: ANTICOAGULATION | Facility: CLINIC | Age: 69
End: 2024-06-06

## 2024-06-06 ENCOUNTER — LAB (OUTPATIENT)
Dept: LAB | Facility: CLINIC | Age: 69
End: 2024-06-06
Payer: COMMERCIAL

## 2024-06-06 DIAGNOSIS — I48.0 PAROXYSMAL ATRIAL FIBRILLATION (H): ICD-10-CM

## 2024-06-06 DIAGNOSIS — I48.0 PAROXYSMAL ATRIAL FIBRILLATION (H): Primary | ICD-10-CM

## 2024-06-06 DIAGNOSIS — E78.5 HYPERLIPIDEMIA LDL GOAL <100: Primary | ICD-10-CM

## 2024-06-06 LAB — INR BLD: 2.7 (ref 0.9–1.1)

## 2024-06-06 PROCEDURE — 36416 COLLJ CAPILLARY BLOOD SPEC: CPT

## 2024-06-06 PROCEDURE — 85610 PROTHROMBIN TIME: CPT

## 2024-06-06 NOTE — PROGRESS NOTES
ANTICOAGULATION MANAGEMENT     Tatiana Skelton 68 year old female is on warfarin with therapeutic INR result. (Goal INR 2.0-3.0)    Recent labs: (last 7 days)     06/06/24  1626   INR 2.7*       ASSESSMENT     Source(s): Chart Review  Previous INR was Therapeutic last 2(+) visits  Medication, diet, health changes since last INR chart reviewed; none identified         PLAN     Recommended plan for no diet, medication or health factor changes affecting INR     Dosing Instructions: Continue your current warfarin dose with next INR in 6 weeks       Summary  As of 6/6/2024      Full warfarin instructions:  10 mg every Mon; 7.5 mg all other days   Next INR check:  7/18/2024               Detailed voice message left for Jaimie with dosing instructions and follow up date.   Sent [employer] message with dosing and follow up instructions    Contact [phone]  to schedule and with any changes, questions or concerns.     Education provided:   Please call back if any changes to your diet, medications or how you've been taking warfarin    Plan made per [facility] anticoagulation protocol    Lois Carpio, RN  Anticoagulation Clinic  6/6/2024    _______________________________________________________________________     Anticoagulation Episode Summary       Current INR goal:  2.0-3.0   TTR:  94.6% (1 y)   Target end date:  Indefinite   Send INR reminders to:  [facility]    Indications    Paroxysmal atrial fibrillation (H) [I48.0]             Comments:               Anticoagulation Care Providers       Provider Role Specialty Phone number    Doris Fraser APRN CNP Referring Cardiovascular Disease [phone]    Nicole Gray NP Referring Nurse Practitioner - Family [phone]    Yadira Douglas MD Referring Family Medicine [phone]

## 2024-07-15 ENCOUNTER — TELEPHONE (OUTPATIENT)
Dept: FAMILY MEDICINE | Facility: CLINIC | Age: 69
End: 2024-07-15
Payer: COMMERCIAL

## 2024-07-15 NOTE — TELEPHONE ENCOUNTER
Patient Quality Outreach    Patient is due for the following:   Diabetes -  Eye Exam  Depression  -  PhQ-2    Next Steps:   Patient was sent [email or web address] message with questionairre.   Type of outreach:    Sent [email or web address] message.      Questions for provider review:    None           Marli Huertas

## 2024-07-24 ENCOUNTER — PATIENT OUTREACH (OUTPATIENT)
Dept: CARE COORDINATION | Facility: CLINIC | Age: 69
End: 2024-07-24
Payer: COMMERCIAL

## 2024-07-25 ENCOUNTER — MYC MEDICAL ADVICE (OUTPATIENT)
Dept: ANTICOAGULATION | Facility: CLINIC | Age: 69
End: 2024-07-25

## 2024-08-01 ENCOUNTER — TELEPHONE (OUTPATIENT)
Dept: ANTICOAGULATION | Facility: CLINIC | Age: 69
End: 2024-08-01
Payer: COMMERCIAL

## 2024-08-01 NOTE — TELEPHONE ENCOUNTER
ANTICOAGULATION     Tatiana Skelton is overdue for an INR check.     Spoke with pt and scheduled lab appointment on 8/7    Jacque Koehler RN

## 2024-08-07 ENCOUNTER — PATIENT OUTREACH (OUTPATIENT)
Dept: CARE COORDINATION | Facility: CLINIC | Age: 69
End: 2024-08-07

## 2024-08-07 ENCOUNTER — LAB (OUTPATIENT)
Dept: LAB | Facility: CLINIC | Age: 69
End: 2024-08-07
Payer: COMMERCIAL

## 2024-08-07 ENCOUNTER — ANTICOAGULATION THERAPY VISIT (OUTPATIENT)
Dept: ANTICOAGULATION | Facility: CLINIC | Age: 69
End: 2024-08-07

## 2024-08-07 DIAGNOSIS — I48.0 PAROXYSMAL ATRIAL FIBRILLATION (H): Primary | ICD-10-CM

## 2024-08-07 DIAGNOSIS — I48.0 PAROXYSMAL ATRIAL FIBRILLATION (H): ICD-10-CM

## 2024-08-07 LAB — INR BLD: 1.7 (ref 0.9–1.1)

## 2024-08-07 PROCEDURE — 85610 PROTHROMBIN TIME: CPT

## 2024-08-07 PROCEDURE — 36416 COLLJ CAPILLARY BLOOD SPEC: CPT

## 2024-08-07 NOTE — PROGRESS NOTES
ANTICOAGULATION MANAGEMENT     Tatiana Skelton 69 year old female is on warfarin with subtherapeutic INR result. (Goal INR 2.0-3.0)    Recent labs: (last 7 days)     08/07/24  0736   INR 1.7*       ASSESSMENT     Source(s): Chart Review and Patient/Caregiver Call     Warfarin doses taken: Warfarin taken as instructed  Diet:  Pt has been watching grandchildren for the past few weeks and eating differently, they left yesterday and plans to return to regular intake.   Medication/supplement changes: None noted  New illness, injury, or hospitalization: No  Signs or symptoms of bleeding or clotting: No  Previous result: Therapeutic last 2(+) visits  Additional findings:  More exercise with grandchildren being in town        PLAN     Recommended plan for temporary change(s) affecting INR     Dosing Instructions: Continue your current warfarin dose with next INR in 2 weeks       Summary  As of 8/7/2024      Full warfarin instructions:  10 mg every Mon; 7.5 mg all other days   Next INR check:  8/21/2024               Telephone call with Jaimie who verbalizes understanding and agrees to plan    Patient offered & declined to schedule next visit    Education provided: Goal range and lab monitoring: goal range and significance of current result and Importance of therapeutic range    Plan made per [facility] anticoagulation protocol    Neftali Mckinnon RN  Anticoagulation Clinic  8/7/2024    _______________________________________________________________________     Anticoagulation Episode Summary       Current INR goal:  2.0-3.0   TTR:  89.5% (1 y)   Target end date:  Indefinite   Send INR reminders to:  [facility]    Indications    Paroxysmal atrial fibrillation (H) [I48.0]             Comments:               Anticoagulation Care Providers       Provider Role Specialty Phone number    Doris Fraser APRN CNP Referring Cardiovascular Disease [phone]    Nicole Gray NP Referring Nurse Practitioner - Family [phone]     Yadira Douglas MD [facility] Medicine [phone]

## 2024-08-10 DIAGNOSIS — I48.0 PAROXYSMAL ATRIAL FIBRILLATION (H): ICD-10-CM

## 2024-08-12 RX ORDER — WARFARIN SODIUM 5 MG/1
TABLET ORAL
Qty: 140 TABLET | Refills: 1 | Status: SHIPPED | OUTPATIENT
Start: 2024-08-12

## 2024-08-12 NOTE — TELEPHONE ENCOUNTER
ANTICOAGULATION MANAGEMENT:  Medication Refill    Anticoagulation Summary  As of 8/7/2024      Warfarin maintenance plan:  10 mg (5 mg x 2) every Mon; 7.5 mg (5 mg x 1.5) all other days   Next INR check:  8/21/2024   Target end date:  Indefinite    Indications    Paroxysmal atrial fibrillation (H) [I48.0]                 Anticoagulation Care Providers       Provider Role Specialty Phone number    Tulio FraserHERMILA haywood CNP Referring Cardiovascular Disease [phone]    Nicole Gray NP Referring Nurse Practitioner - Family [phone]    Yadira Douglas MD Referring Family Medicine [phone]            Refill Criteria    Visit with referring provider/group: Meets criteria: visit within referring provider group in the last 15 months on 11/6/2023    [facility] referral last signed: 12/27/2023; within last year: Yes    Lab monitoring not exceeding 2 weeks overdue: Yes    Tatiana meets all criteria for refill. Rx instructions and quantity supplied updated to match patient's current dosing plan. Warfarin 90 day supply with 1 refill granted per [facility] protocol     Mago Chu, RN  Anticoagulation Clinic

## 2024-08-26 ENCOUNTER — NURSE TRIAGE (OUTPATIENT)
Dept: FAMILY MEDICINE | Facility: CLINIC | Age: 69
End: 2024-08-26
Payer: COMMERCIAL

## 2024-08-26 NOTE — TELEPHONE ENCOUNTER
"Bug bite left leg close to ankle; she thought maybe a fly but not sure.  onset Saturday pm.  States swollen, hot, hard around it.  Area around it is red.  She states has poor circulation in her legs as well as history of edema.  Has been leaving it open to air and applying an ice pack.  Is at work but sits 85% of time.  Reports no other symptoms.  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 Wanting clinic appointment. No available appointment til tomorrow.  Refuses urgent care. At work now.  Appointment tomorrow a.m..  arrival time 7:10am for 7:30am appointment.  Has INR appointment but states will call them and have them reschedule for either before or after her appointment.  Reason for Disposition   Patient wants to be seen    Additional Information   Negative: Passed out (i.e., fainted, collapsed and was not responding)   Negative: Wheezing or difficulty breathing   Negative: Hoarseness, cough or tightness in the throat or chest   Negative: Swollen tongue or difficulty swallowing   Negative: Life-threatening reaction (anaphylaxis) in the past to bite from same insect and < 2 hours since bite   Negative: Sounds like a life-threatening emergency to the triager   Negative: Bee sting(s)   Negative: Spider bite(s)   Negative: Tick bite(s)   Negative: Mosquito bite(s)   Negative: Doesn't sound like an insect bite   Negative: Patient sounds very sick or weak to the triager   Negative: SEVERE bite pain and not improved after 2 hours of pain medicine   Negative: Fever and red area   Negative: Fever and area is very tender to touch   Negative: Red streak or red line and length > 2 inches (5 cm)   Negative: Red or very tender (to touch) area, and started over 24 hours after the bite   Negative: Red or very tender (to touch) area, getting larger over 48 hours after the bite    Protocols used: Insect Bite-A-OH    "

## 2024-08-27 ENCOUNTER — OFFICE VISIT (OUTPATIENT)
Dept: FAMILY MEDICINE | Facility: CLINIC | Age: 69
End: 2024-08-27
Payer: COMMERCIAL

## 2024-08-27 ENCOUNTER — ANTICOAGULATION THERAPY VISIT (OUTPATIENT)
Dept: ANTICOAGULATION | Facility: CLINIC | Age: 69
End: 2024-08-27

## 2024-08-27 ENCOUNTER — LAB (OUTPATIENT)
Dept: LAB | Facility: CLINIC | Age: 69
End: 2024-08-27
Payer: COMMERCIAL

## 2024-08-27 VITALS
DIASTOLIC BLOOD PRESSURE: 82 MMHG | TEMPERATURE: 97.8 F | RESPIRATION RATE: 18 BRPM | OXYGEN SATURATION: 97 % | HEART RATE: 67 BPM | BODY MASS INDEX: 24.73 KG/M2 | WEIGHT: 167 LBS | HEIGHT: 69 IN | SYSTOLIC BLOOD PRESSURE: 122 MMHG

## 2024-08-27 DIAGNOSIS — S80.862A INSECT BITE OF LEFT LOWER LEG, INITIAL ENCOUNTER: ICD-10-CM

## 2024-08-27 DIAGNOSIS — I48.0 PAROXYSMAL ATRIAL FIBRILLATION (H): ICD-10-CM

## 2024-08-27 DIAGNOSIS — I48.0 PAROXYSMAL ATRIAL FIBRILLATION (H): Primary | ICD-10-CM
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
## 2024-08-27 DIAGNOSIS — K04.7 TOOTH INFECTION: Primary | ICD-10-CM

## 2024-08-27 LAB — INR BLD: 2.3 (ref 0.9–1.1)

## 2024-08-27 PROCEDURE — 99214 OFFICE O/P EST MOD 30 MIN: CPT | Performed by: NURSE PRACTITIONER

## 2024-08-27 PROCEDURE — G2211 COMPLEX E/M VISIT ADD ON: HCPCS | Performed by: NURSE PRACTITIONER

## 2024-08-27 PROCEDURE — 85610 PROTHROMBIN TIME: CPT

## 2024-08-27 PROCEDURE — 36416 COLLJ CAPILLARY BLOOD SPEC: CPT

## 2024-08-27 RX ORDER — HYDROCORTISONE 2.5 %
CREAM (GRAM) TOPICAL 2 TIMES DAILY PRN
Qty: 30 G | Refills: 0 | Status: SHIPPED | OUTPATIENT
Start: 2024-08-27 | End: 2024-08-28

## 2024-08-27 RX ORDER — CETIRIZINE HYDROCHLORIDE 10 MG/1
10 TABLET ORAL DAILY
Qty: 30 TABLET | Refills: 3 | Status: SHIPPED | OUTPATIENT
Start: 2024-08-27 | End: 2024-08-28

## 2024-08-27 ASSESSMENT — ENCOUNTER SYMPTOMS: EYE PAIN: 1

## 2024-08-27 ASSESSMENT — PAIN SCALES - GENERAL: PAINLEVEL: MILD PAIN (3)

## 2024-08-27 NOTE — PROGRESS NOTES
ANTICOAGULATION MANAGEMENT     Tatiana Skelton 69 year old female is on warfarin with therapeutic INR result. (Goal INR 2.0-3.0)    Recent labs: (last 7 days)     08/27/24  0717   INR 2.3*       ASSESSMENT     Source(s): Chart Review  Previous INR was Subtherapeutic  Medication, diet, health changes since last INR chart reviewed; none identified  Seen by provider today and started on Augmentin x 10 days for a tooth infection            PLAN     Recommended plan for temporary change(s) affecting INR     Dosing Instructions: Continue your current warfarin dose with next INR in 1 week       Summary  As of 8/27/2024      Full warfarin instructions:  10 mg every Mon; 7.5 mg all other days   Next INR check:  9/5/2024               Detailed voice message left for Jaimie with dosing instructions and follow up date.     Patient returned call and she states that the pharmacy has not been able to fill her medications due to the power being out at this time.  She is unsure when she will start the antibiotic , did advise to have her recheck the INR in one week due to possible interaction/side effects from the medication.  She is stating that due to her work schedule and lab schedule availability, she cannot come back until 9/5/24. Did advise to call back if has diarrhea or side effects from augmentin and may need to have INR done sooner if she does have symptoms.     Contact [phone] to schedule and with any changes, questions or concerns.     Education provided: Contact [phone] with any changes, questions or concerns.     Plan made per [facility] anticoagulation protocol    Paty Harris RN  Anticoagulation Clinic  8/27/2024    _______________________________________________________________________     Anticoagulation Episode Summary       Current INR goal:  2.0-3.0   TTR:  86.8% (1 y)   Target end date:  Indefinite   Send INR reminders to:  LOU MEJIA    Indications    Paroxysmal atrial fibrillation (H)  [I48.0]             Comments:               Anticoagulation Care Providers       Provider Role Specialty Phone number    Doris Fraser APRN CNP Referring Cardiovascular Disease [phone]    Nicole Gray NP Referring Nurse Practitioner - Family [phone]    Yadira Douglas MD Referring Family Medicine [phone]

## 2024-08-27 NOTE — PROGRESS NOTES
Assessment & Plan     Tooth infection  Start antibiotics as below. Encouraged to re-establish with dentist. Proper oral hygiene reviewed. Follow-up with new or worsening symptoms.     - amoxicillin-clavulanate (AUGMENTIN) 875-125 MG tablet; Take 1 tablet by mouth 2 times daily for 10 days.    Insect bite of left lower leg, initial encounter  Does not appear grossly infected, however Augmentin as above should cover any concern of infection. Start daily antihistamine with cetirizine as below. Topical corticosteroid as needed over bites for itch and inflammation. Suspect itching of eyes likely related to histamine response as well. Monitor for sign/symptoms of infection. Notify the clinic with any new or worsening symptoms.     - cetirizine (ZYRTEC) 10 MG tablet; Take 1 tablet (10 mg) by mouth daily.  - hydrocortisone 2.5 % cream; Apply topically 2 times daily as needed for rash.    Paroxysmal atrial fibrillation (H)  Pacemaker in place. Heart rate in regular today. Following with Cardiology.     I explained my diagnostic considerations and recommendations to the patient, who voiced understanding and agreement with the assessment and treatment plan. All questions were answered to patient's apparent satisfaction. We discussed potential side effects of any prescribed or recommended therapies, as well as expectations for response to treatments and importance of lifestyle measures that may improve symptoms. Patient was advised to contact our office if there are new symptoms or no improvement or worsening of conditions or symptoms.    The longitudinal plan of care for the diagnosis(es)/condition(s) as documented were addressed during this visit. Due to the added complexity in care, I will continue to support Jaimie in the subsequent management and with ongoing continuity of care.            Tu Etienne is a 69 year old, presenting for the following health issues:  Derm Problem (Rash on body), Ear Problem (Maybe ear  infection), Eye Problem (Eye itching), and Dental Problem (Abscess tooth)        11/6/2023     1:49 PM   Additional Questions   Roomed by Carlos LOPES     History of Present Illness       Reason for visit:  Spot on ankle,spot on hip,rash/infection both ears,abcess tooth  Symptom onset:  More than a month   She is taking medications regularly.     Jaimie has concerns today of a bite karyna on her left ankle. 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 She noticed a bite karyna on her medial ankle. Yesterday and today she felt as though the area was becoming warm and swollen. She was concerned she could have cellulitis.She notes applying baking soda packs to the site which she feels was helpful. She notes this morning the spot looks much better than it did yesterday.   Last night she did also sustain a bite or sting injury on her right hip. She initially had a dime sized red spot around the karyna, which has progressed to having a larger red patch that is slightly warm to touch. She feels this could have been a bee sting. She applied a baking soda compress over the area which she feels was helpful.   Jaimie also has concerns of tooth pain, in her gum and jaw on the upper right side of her face. She notes this extends into the sinus and to her right ear. She notes a history of frequent ear infections. She has not seen a dentist regularly, she notes she does need to establish with a dentist soon.   With these symptoms, she has not had any fever, chills, nausea or vomiting.     Patient Active Problem List   Diagnosis    Paroxysmal atrial fibrillation (H)    Frequent UTI    Cardiac Pacemaker- Medtr[employer], dual chamber- NOT dependant    Sinus bradycardia    Dysplasia of cervix, low grade (SANJU 1)    Hyperlipidemia LDL goal <100    Open angle with borderline findings, low risk    Generalized osteoarthrosis, unspecified site     Current Outpatient Medications   Medication Sig Dispense Refill    amoxicillin-clavulanate  "(AUGMENTIN) 875-125 MG tablet Take 1 tablet by mouth 2 times daily for 10 days. 20 tablet 0    calcium carbonate (OS-SONIA) 500 MG tablet Take 1 tablet by mouth 2 times daily      cetirizine (ZYRTEC) 10 MG tablet Take 1 tablet (10 mg) by mouth daily. 30 tablet 3    cholecalciferol 25 MCG (1000 UT) TABS       Coenzyme Q10 (COQ10 PO) Take by mouth.      furosemide (LASIX) 20 MG tablet Take 2 tablets (40 mg) by mouth daily 180 tablet 2    hydrocortisone 2.5 % cream Apply topically 2 times daily as needed for rash. 30 g 0    ketoconazole (NIZORAL) 2 % external shampoo Apply topically daily as needed      mometasone (ELOCON) 0.1 % external cream Apply topically daily as needed (itch) 45 g 1    mometasone (ELOCON) 0.1 % external ointment Apply sparingly to affected area twice daily as needed.  Do not apply to face. 45 g 1    rosuvastatin (CRESTOR) 40 MG tablet Take 1 tablet (40 mg) by mouth daily 90 tablet 3    triamcinolone (KENALOG) 0.1 % external lotion Apply topically 2 times daily      warfarin ANTICOAGULANT (JANTOVEN ANTICOAGULANT) 5 MG tablet TAKE 2 TABLETS BY MOUTH ON MONDAY AND TAKE 1.5 TABLETSON ALL OTHER DAYS AS DIRECTED BY COUMADIN CLINIC 140 tablet 1    magnesium 250 MG tablet Take 1 tablet by mouth daily (Patient not taking: Reported on 8/27/2024)       No current facility-administered medications for this visit.         Review of Systems  Constitutional, HEENT, cardiovascular, pulmonary, gi and gu systems are negative, except as otherwise noted.      Objective    /82   Pulse 67   Temp 97.8  F (36.6  C) (Temporal)   Resp 18   Ht 1.743 m (5' 8.62\")   Wt 75.8 kg (167 lb)   LMP  (LMP Unknown)   SpO2 97%   BMI 24.93 kg/m    Body mass index is 24.93 kg/m .  Physical Exam  Vitals reviewed.   Constitutional:       General: She is not in acute distress.     Appearance: Normal appearance.   HENT:      Head: Normocephalic and atraumatic.      Right Ear: Tympanic membrane normal.      Left Ear: Tympanic " membrane normal.      Mouth/Throat:      Mouth: Mucous membranes are moist.      Pharynx: Oropharynx is clear.      Comments: Erythema and mild edema over right upper incisor   Eyes:      Extraocular Movements: Extraocular movements intact.      Conjunctiva/sclera: Conjunctivae normal.   Cardiovascular:      Rate and Rhythm: Normal rate and regular rhythm.      Heart sounds: Normal heart sounds.      Comments: Pacemaker in place  Pulmonary:      Effort: Pulmonary effort is normal.      Breath sounds: Normal breath sounds.   Skin:     General: Skin is warm and dry.      Comments: One inch erythematic lesion on medial left ankle with scabbed pin-point center. Slightly warm to touch, tenderness with palpation of surrounding skin.  Right posterior hip with erythema and edema, single pin-point bite karyna in center. Non-tender.    Neurological:      Mental Status: She is alert and oriented to person, place, and time. Mental status is at baseline.   Psychiatric:         Mood and Affect: Mood normal.         Behavior: Behavior normal.                    Signed Electronically by: Kimberly Michael NP

## 2024-08-28 RX ORDER — HYDROCORTISONE 2.5 %
CREAM (GRAM) TOPICAL 2 TIMES DAILY PRN
Qty: 30 G | Refills: 0 | Status: SHIPPED | OUTPATIENT
Start: 2024-08-28

## 2024-08-28 RX ORDER — CETIRIZINE HYDROCHLORIDE 10 MG/1
10 TABLET ORAL DAILY
Qty: 30 TABLET | Refills: 3 | Status: SHIPPED | OUTPATIENT
Start: 2024-08-28

## 2024-08-28 NOTE — PROGRESS NOTES
Pharmacy called, transmission failed for prescriptions- prescriptions resent to pharmacy.  Verónica Blunt RN on 8/28/2024 at 3:43 PM

## 2024-09-04 ENCOUNTER — ANTICOAGULATION THERAPY VISIT (OUTPATIENT)
Dept: ANTICOAGULATION | Facility: CLINIC | Age: 69
End: 2024-09-04

## 2024-09-04 ENCOUNTER — LAB (OUTPATIENT)
Dept: LAB | Facility: CLINIC | Age: 69
End: 2024-09-04
Payer: COMMERCIAL

## 2024-09-04 DIAGNOSIS — I48.0 PAROXYSMAL ATRIAL FIBRILLATION (H): Primary | ICD-10-CM

## 2024-09-04 DIAGNOSIS — I48.0 PAROXYSMAL ATRIAL FIBRILLATION (H): ICD-10-CM

## 2024-09-04 LAB — INR BLD: 2 (ref 0.9–1.1)

## 2024-09-04 PROCEDURE — 36416 COLLJ CAPILLARY BLOOD SPEC: CPT

## 2024-09-04 PROCEDURE — 85610 PROTHROMBIN TIME: CPT

## 2024-09-04 NOTE — PROGRESS NOTES
ANTICOAGULATION MANAGEMENT     Tatiana Skelton 69 year old female is on warfarin with therapeutic INR result. (Goal INR 2.0-3.0)    Recent labs: (last 7 days)     09/04/24  0741   INR 2.0*       ASSESSMENT     Source(s): Chart Review and Patient/Caregiver Call     Warfarin doses taken: Warfarin taken as instructed  Diet: No new diet changes identified  Medication/supplement changes:  Pt is on Augmentin x 10 days, has a few days left. This dose not appear to have effected her INR.   New illness, injury, or hospitalization: No  Signs or symptoms of bleeding or clotting: No  Previous result: Therapeutic last visit; previously outside of goal range  Additional findings: None       PLAN     Recommended plan for no diet, medication or health factor changes affecting INR     Dosing Instructions: Continue your current warfarin dose with next INR in 6 weeks       Summary  As of 9/4/2024      Full warfarin instructions:  10 mg every Mon; 7.5 mg all other days   Next INR check:  10/16/2024               Telephone call with Jaimie who verbalizes understanding and agrees to plan and who agrees to plan and repeated back plan correctly    Lab visit scheduled    Education provided: None required    Plan made per [facility] anticoagulation protocol    Jacque Koehler, KELLY  Anticoagulation Clinic  9/4/2024    _______________________________________________________________________     Anticoagulation Episode Summary       Current INR goal:  2.0-3.0   TTR:  86.8% (1 y)   Target end date:  Indefinite   [facility]    Indications    Paroxysmal atrial fibrillation (H) [I48.0]             Comments:               Anticoagulation Care Providers       Provider Role Specialty Phone number    Doris Fraser APRN CNP Referring Cardiovascular Disease [phone]    Nicole Gray NP Referring Nurse Practitioner - Family [phone]    Yadira Douglas MD Referring Family Medicine [phone]

## 2024-10-12 ENCOUNTER — HEALTH MAINTENANCE LETTER (OUTPATIENT)
Age: 69
End: 2024-10-12

## 2024-10-16 ENCOUNTER — ANTICOAGULATION THERAPY VISIT (OUTPATIENT)
Dept: ANTICOAGULATION | Facility: CLINIC | Age: 69
End: 2024-10-16

## 2024-10-16 ENCOUNTER — LAB (OUTPATIENT)
Dept: LAB | Facility: CLINIC | Age: 69
End: 2024-10-16
Payer: COMMERCIAL

## 2024-10-16 DIAGNOSIS — I48.0 PAROXYSMAL ATRIAL FIBRILLATION (H): ICD-10-CM

## 2024-10-16 DIAGNOSIS — I48.0 PAROXYSMAL ATRIAL FIBRILLATION (H): Primary | ICD-10-CM

## 2024-10-16 LAB — INR BLD: 2.2 (ref 0.9–1.1)

## 2024-10-16 PROCEDURE — 85610 PROTHROMBIN TIME: CPT

## 2024-10-16 PROCEDURE — 36416 COLLJ CAPILLARY BLOOD SPEC: CPT

## 2024-10-16 NOTE — PROGRESS NOTES
ANTICOAGULATION MANAGEMENT     Tatiana Skelton 69 year old female is on warfarin with therapeutic INR result. (Goal INR 2.0-3.0)    Recent labs: (last 7 days)     10/16/24  0749   INR 2.2*       ASSESSMENT     Source(s): Chart Review and Patient/Caregiver Call     Warfarin doses taken: Warfarin taken as instructed  Diet: No new diet changes identified  Medication/supplement changes: None noted  New illness, injury, or hospitalization: No  Signs or symptoms of bleeding or clotting: No  Previous result: Therapeutic last 2(+) visits  Additional findings: None       PLAN     Recommended plan for no diet, medication or health factor changes affecting INR     Dosing Instructions: Continue your current warfarin dose with next INR in 6 weeks       Summary  As of 10/16/2024      Full warfarin instructions:  10 mg every Mon; 7.5 mg all other days   Next INR check:  11/27/2024               Telephone call with Jaimie who verbalizes understanding and agrees to plan and who agrees to plan and repeated back plan correctly    Lab visit scheduled    Education provided: None required    Plan made per [facility] anticoagulation protocol    Jacque Koehler RN  10/16/2024  Anticoagulation Clinic  [employer] for routing messages: jovon MEJIA  [facility] patient phone line: [phone]        _______________________________________________________________________     Anticoagulation Episode Summary       Current INR goal:  2.0-3.0   TTR:  91.4% (1 y)   Target end date:  Indefinite   Send INR reminders to:  LOU MEJIA    Indications    Paroxysmal atrial fibrillation (H) [I48.0]             Comments:               Anticoagulation Care Providers       Provider Role Specialty Phone number    Doris Fraser APRN CNP Referring Cardiovascular Disease [phone]    Nicole Gray NP Referring Nurse Practitioner - Family [phone]    Yadira Douglas MD Referring Family Medicine [phone]             [employer]

## 2024-11-06 ENCOUNTER — TRANSFERRED RECORDS (OUTPATIENT)
Dept: MULTI SPECIALTY CLINIC | Facility: CLINIC | Age: 69
End: 2024-11-06

## 2024-11-06 LAB — RETINOPATHY: NORMAL

## 2024-11-27 ENCOUNTER — ANTICOAGULATION THERAPY VISIT (OUTPATIENT)
Dept: ANTICOAGULATION | Facility: CLINIC | Age: 69
End: 2024-11-27

## 2024-11-27 ENCOUNTER — DOCUMENTATION ONLY (OUTPATIENT)
Dept: ANTICOAGULATION | Facility: CLINIC | Age: 69
End: 2024-11-27

## 2024-11-27 ENCOUNTER — LAB (OUTPATIENT)
Dept: LAB | Facility: CLINIC | Age: 69
End: 2024-11-27
Payer: COMMERCIAL

## 2024-11-27 DIAGNOSIS — I48.0 PAROXYSMAL ATRIAL FIBRILLATION (H): ICD-10-CM

## 2024-11-27 DIAGNOSIS — I48.0 PAROXYSMAL ATRIAL FIBRILLATION (H): Primary | ICD-10-CM

## 2024-11-27 LAB — INR BLD: 2.9 (ref 0.9–1.1)

## 2024-11-27 PROCEDURE — 85610 PROTHROMBIN TIME: CPT

## 2024-11-27 PROCEDURE — 36416 COLLJ CAPILLARY BLOOD SPEC: CPT

## 2024-11-27 NOTE — PROGRESS NOTES
ANTICOAGULATION CLINIC REFERRAL RENEWAL REQUEST       An annual renewal order is required for all patients referred to [facility].?  Please review and sign the pended referral order for Tatinaa Skelton.       ANTICOAGULATION SUMMARY      Warfarin indication(s)   Atrial Fibrillation    Mechanical heart valve present?  NO       Current goal range   INR: 2.0-3.0     Goal appropriate for indication? Goal INR 2-3, standard for indication(s) above     Time in Therapeutic Range (TTR)  (Goal > 60%) 92.2%       Office visit with referring provider's group within last year yes on 8/27/24       Jacque Koehler RN  [facility]

## 2024-12-09 ENCOUNTER — TELEPHONE (OUTPATIENT)
Dept: FAMILY MEDICINE | Facility: CLINIC | Age: 69
End: 2024-12-09
Payer: COMMERCIAL

## 2024-12-09 NOTE — TELEPHONE ENCOUNTER
1:40 PM    Verified with Jaimie that she does not need to hold warfarin for a crown placement. If the dental office does have specifications or an INR goal they request, than she is to contact us.     Jazmin Kaba RN, BSN, N  Anticoagulation Clinic   [phone]

## 2024-12-09 NOTE — TELEPHONE ENCOUNTER
Patient Returning Call    Reason for call:  Patient is returning a call from anticoagulation but no telephone encounter is from today. She has a dental procedure on Thursday this week on 12/12/24. Please call. 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

## 2024-12-10 ENCOUNTER — NURSE TRIAGE (OUTPATIENT)
Dept: FAMILY MEDICINE | Facility: CLINIC | Age: 69
End: 2024-12-10
Payer: COMMERCIAL

## 2024-12-10 NOTE — TELEPHONE ENCOUNTER
Nurse Triage SBAR    Is this a 2nd Level Triage? YES, LICENSED PRACTITIONER REVIEW IS REQUIRED    Situation: Patient reports she developed back pain on Saturday. Denies injury, but was doing a lot of housework.    Background: No history of back problems per patient    Assessment: Patient reports pain has worsened the past two days. She is able to walk, but it hurts to stand upright. No urinary symptoms. No fevers. She has tried Tylenol and heat with no relief. There are no tender areas to touch. No pain, weakness or tingling to LE. She is urinating and have normal bowel movements.     Protocol Recommended Disposition:   See in Office Today    Recommendation: No openings today, patient declines UC or ED due to expense. Can she be worked in today or can she wait until tomorrow to come to clinic?    Routed to provider    Does the patient meet one of the following criteria for ADS visit consideration? 16+ years old, with an [facility] PCP     TIP  Providers, please consider if this condition is appropriate for management at one of our Acute and Diagnostic Services sites.     If patient is a good candidate, please use dotphrase <dot>triageresponse and select Refer to ADS to document.    Rosa Pena RN on 12/10/2024 at 2:16 PM        Reason for Disposition   SEVERE back pain (e.g., excruciating, unable to do any normal activities) and not improved after pain medicine and CARE ADVICE    Additional Information   Negative: Passed out (e.g., fainted, lost consciousness, blacked out and was not responding)   Negative: Shock suspected (e.g., cold/pale/clammy skin, too weak to stand, low BP, rapid pulse)   Negative: Sounds like a life-threatening emergency to the triager   Negative: Major injury to the back (e.g., MVA, fall > 10 feet or 3 meters, penetrating injury, etc.)   Negative: Pain in the upper back over the ribs (rib cage) that radiates (travels) into the chest   Negative: Pain in the upper back over the ribs (rib cage)  and worsened by coughing (or clearly increases with breathing)   Negative: Back pain during pregnancy   Negative: Pain radiates into groin, scrotum   Negative: Blood in urine (red, pink, or tea-colored)   Negative: Vomiting and pain over lower ribs of back (i.e., flank - kidney area)   Negative: Weakness of a leg or foot (e.g., unable to bear weight, dragging foot)   Negative: Patient sounds very sick or weak to the triager   Negative: Fever > 100.4 F (38.0 C) and flank pain   Negative: Pain or burning with passing urine (urination)   Negative: SEVERE back pain of sudden onset and age > 60 years   Negative: SEVERE abdominal pain (e.g., excruciating)   Negative: Abdominal pain and age > 60 years   Negative: Unable to urinate (or only a few drops) and bladder feels very full   Negative: Loss of bladder or bowel control (urine or bowel incontinence; wetting self, leaking stool) of new-onset   Negative: Numbness (loss of sensation) in groin or rectal area    Protocols used: Back Pain-A-OH

## 2024-12-10 NOTE — TELEPHONE ENCOUNTER
Called and notified patient of provider response, per below note. She verbalized that she is NOT going to UC or ED. Appointment scheduled for tomorrow 12/11/24, per patient request. She had no other questions or concerns.     NANCY AcN, RN

## 2024-12-10 NOTE — TELEPHONE ENCOUNTER
Reason for Call:  Appointment Request    Patient requesting this type of appt:  ASAP for back pain, Insurance prefers in person visits    Requested provider: Yadira Douglas    Reason patient unable to be scheduled: Not within requested timeframe    When does patient want to be seen/preferred time: Same day    Comments: Patient has had back pain since 12/07/2024 and not getting better    Could we send this information to you in [facility] or would you prefer to receive a phone call?:   Patient would prefer a phone call   Okay to leave a detailed message?: Yes at Home number on file [phone] (home)    Call taken on 12/10/2024 at 1:52 PM by Kelly Landers

## 2024-12-11 ENCOUNTER — HOSPITAL ENCOUNTER (OUTPATIENT)
Dept: GENERAL RADIOLOGY | Facility: CLINIC | Age: 69
Discharge: HOME | End: 2024-12-11
Attending: INTERNAL MEDICINE
Payer: COMMERCIAL

## 2024-12-11 ENCOUNTER — OFFICE VISIT (OUTPATIENT)
Dept: INTERNAL MEDICINE | Facility: CLINIC | Age: 69
End: 2024-12-11
Payer: COMMERCIAL

## 2024-12-11 VITALS
OXYGEN SATURATION: 96 % | DIASTOLIC BLOOD PRESSURE: 84 MMHG | TEMPERATURE: 98.6 F | SYSTOLIC BLOOD PRESSURE: 126 MMHG | HEART RATE: 65 BPM | RESPIRATION RATE: 20 BRPM

## 2024-12-11 DIAGNOSIS — M54.50 ACUTE BILATERAL LOW BACK PAIN WITHOUT SCIATICA: ICD-10-CM

## 2024-12-11 DIAGNOSIS — Z95.0 CARDIAC PACEMAKER IN SITU: ICD-10-CM

## 2024-12-11 DIAGNOSIS — I48.0 PAROXYSMAL ATRIAL FIBRILLATION (H): ICD-10-CM

## 2024-12-11 DIAGNOSIS — S39.012A STRAIN OF LUMBAR REGION, INITIAL ENCOUNTER: Primary | ICD-10-CM

## 2024-12-11 LAB
ALBUMIN UR-MCNC: NEGATIVE MG/DL
APPEARANCE UR: CLEAR
BILIRUB UR QL STRIP: NEGATIVE
COLOR UR AUTO: ABNORMAL
GLUCOSE UR STRIP-MCNC: NEGATIVE MG/DL
HGB UR QL STRIP: NEGATIVE
HYALINE CASTS: 3 /LPF
KETONES UR STRIP-MCNC: NEGATIVE MG/DL
LEUKOCYTE ESTERASE UR QL STRIP: ABNORMAL
MUCOUS THREADS #/AREA URNS LPF: PRESENT /LPF
NITRATE UR QL: NEGATIVE
PH UR STRIP: 5.5 [PH] (ref 5–7)
RBC URINE: 0 /HPF
SP GR UR STRIP: 1.01 (ref 1–1.03)
SQUAMOUS EPITHELIAL: <1 /HPF
UROBILINOGEN UR STRIP-MCNC: NORMAL MG/DL
WBC URINE: <1 /HPF

## 2024-12-11 PROCEDURE — G2211 COMPLEX E/M VISIT ADD ON: HCPCS | Performed by: INTERNAL MEDICINE

## 2024-12-11 PROCEDURE — 72100 X-RAY EXAM L-S SPINE 2/3 VWS: CPT

## 2024-12-11 PROCEDURE — 81001 URINALYSIS AUTO W/SCOPE: CPT | Performed by: INTERNAL MEDICINE

## 2024-12-11 PROCEDURE — 99214 OFFICE O/P EST MOD 30 MIN: CPT | Performed by: INTERNAL MEDICINE

## 2024-12-11 RX ORDER — TIZANIDINE 2 MG/1
2 TABLET ORAL 3 TIMES DAILY
Qty: 7 TABLET | Refills: 0 | Status: SHIPPED | OUTPATIENT
Start: 2024-12-11

## 2024-12-11 RX ORDER — PREDNISONE 20 MG/1
20 TABLET ORAL DAILY
Qty: 10 TABLET | Refills: 0 | Status: SHIPPED | OUTPATIENT
Start: 2024-12-11

## 2024-12-11 ASSESSMENT — PAIN SCALES - GENERAL: PAINLEVEL_OUTOF10: MODERATE PAIN (5)

## 2024-12-11 NOTE — PROGRESS NOTES
Tu Etienne is a 69 year old, presenting for the following health issues:  Back Pain        12/11/2024     9:08 AM   Additional Questions   Roomed by Marli VERA     Via the Health Maintenance questionnaire, the patient has reported the following services have been completed -Eye Exam: [facility] clinic 2024-11-06, this information has been sent to the abstraction team.  History of Present Illness       Back Pain:  She presents for follow up of back pain. Patient's back pain is a new problem.    Original cause of back pain: turning/bending  First noticed back pain: in the last week  Patient feels back pain: comes and goesLocation of back pain:  Right lower back, left lower back, right upper back, left upper back, right side of neck, left side of neck, right shoulder, right side of waist and left side of waist  Description of back pain: other  Back pain spreads: nowhere    Since patient first noticed back pain, pain is: always present, but gets better and worse  Does back pain interfere with her job:  Yes  On a scale of 1-10 (10 being the worst), patient describes pain as:  5  What makes back pain worse: bending, coughing, certain positions, sitting, standing and twisting   Acupuncture: not tried  Acetaminophen: not tried  Activity or exercise: not helpful  Chiropractor:  Not tried  Cold: not helpful  Heat: helpful  Massage: not tried  Muscle relaxants: not tried  NSAIDS: not tried  Opioids: not tried  Physical Therapy: not tried  Rest: helpful  Steroid Injection: not tried  Stretching: not helpful  Surgery: not tried  TENS unit: not tried  Topical pain relievers: not tried  Other healthcare providers patient is seeing for back pain: None   She is taking medications regularly.                  EMR reviewed including:             Complaint, History of Chief Complaint, Corresponding Review of Systems, and Complaint Specific Physical Examination.    #1   Back pain.  Present about 1 week.  Minimal relief  with Tylenol.  Was doing some modest lifting.  Pain is bilateral in the lumbar area.  No radicular pain.  No sacroiliac discomfort.  Palpable paraspinal spasm in the lumbar area.  No rashes or evidence of zoster.  No weakness in the lower extremities.  Noticeable decrease in the normal lordotic curve.          Exam:  MS: As above   NEURO: Pt is alert and appropriate. No neurologic lateralization is noted. Cranial nerves 2-12 are intact. Peripheral sensory and motor function are grossly normal.       #2   Follow-up on chronic atrial fibrillation  Pacemaker in place for sick sinus syndrome.  Patient on chronic anticoagulation precluding the safe use of NSAIDs.  Denies any syncope or near syncope.  Denies chest pain.  Denies signs of congestive heart failure or edema.  Denies orthopnea.        Exam:     LUNGS: clear bilaterally, airflow is brisk, no intercostal retraction or stridor is noted. No coughing is noted during visit.   HEART: Irregularly irregular without rubs, clicks, gallops, or murmurs. PMI is nondisplaced. Upstrokes are brisk. S1,S2 are heard.   GI: Abdomen is soft, without rebound, guarding or tenderness. Bowel sounds are appropriate. No renal bruits are heard.        Patient has been interviewed, applicable history and applied review of systems have been performed.    Vital Signs:   /84   Pulse 65   Temp 98.6  F (37  C) (Temporal)   Resp 20   LMP  (LMP Unknown)   SpO2 96%       Decision Making    Problem and Complexity     1. Strain of lumbar region, initial encounter (Primary)  Will place on a short course of prednisone and a nighttime muscle relaxer.  Recommend moist heat when possible.  Limit lifting and bending.  Patient will decide whether or not she wants to see her chiropractor or massage therapist.  - predniSONE (DELTASONE) 20 MG tablet; Take 1 tablet (20 mg) by mouth daily.  Dispense: 10 tablet; Refill: 0  - tiZANidine (ZANAFLEX) 2 MG tablet; Take 1 tablet (2 mg) by mouth 3 times  daily.  Dispense: 7 tablet; Refill: 0    2. Acute bilateral low back pain without sciatica  Obtain x-ray to rule out occult compression fracture.  - XR Lumbar Spine 2/3 Views; Future  - UA Macroscopic with reflex to Microscopic and Culture - Lab Collect; Future  - UA Macroscopic with reflex to Microscopic and Culture - Lab Collect    3. Paroxysmal atrial fibrillation (H)  Pacemaker in place.  Continue chronic anticoagulation    4. Cardiac Pacemaker- Medtronic, dual chamber- NOT dependant  Follow-up with cardiology for regular surveillance and pacemaker care.                                FOLLOW UP   I have asked the patient to make an appointment for followup with me in 2 weeks if not markedly improved.    Regarding routine vaccinations:  I have reviewed the patient's vaccination schedule and discussed the benefits of prophylactic vaccination in detail.  I recommend the patient contact their pharmacist for vaccinations.  Discussed that most insurance companies now favor reimbursement to the pharmacies and it will financially behoove the patient to have vaccinations performed at their pharmacy.        I have carefully explained the diagnosis and treatment options to the patient.  The patient has displayed an understanding of the above, and all subsequent questions were answered.      DO VESNA June    Portions of this note were produced using [employer]  Although every attempt at real-time proof reading has been made, occasional grammar/syntax errors may have been missed.

## 2024-12-12 ENCOUNTER — ANCILLARY PROCEDURE (OUTPATIENT)
Dept: CARDIOLOGY | Facility: CLINIC | Age: 69
End: 2024-12-12
Attending: INTERNAL MEDICINE
Payer: COMMERCIAL

## 2024-12-12 DIAGNOSIS — I49.5 SINUS NODE DYSFUNCTION (H): ICD-10-CM

## 2024-12-12 DIAGNOSIS — Z95.0 CARDIAC PACEMAKER IN SITU: ICD-10-CM

## 2024-12-12 PROCEDURE — 93296 REM INTERROG EVL PM/IDS: CPT | Performed by: INTERNAL MEDICINE

## 2024-12-12 PROCEDURE — 93294 REM INTERROG EVL PM/LDLS PM: CPT | Performed by: INTERNAL MEDICINE

## 2024-12-15 LAB
MDC_IDC_LEAD_CONNECTION_STATUS: NORMAL
MDC_IDC_LEAD_CONNECTION_STATUS: NORMAL
MDC_IDC_LEAD_IMPLANT_DT: NORMAL
MDC_IDC_LEAD_IMPLANT_DT: NORMAL
MDC_IDC_LEAD_LOCATION: NORMAL
MDC_IDC_LEAD_LOCATION: NORMAL
MDC_IDC_LEAD_LOCATION_DETAIL_1: NORMAL
MDC_IDC_LEAD_LOCATION_DETAIL_1: NORMAL
MDC_IDC_LEAD_MFG: NORMAL
MDC_IDC_LEAD_MFG: NORMAL
MDC_IDC_LEAD_MODEL: NORMAL
MDC_IDC_LEAD_MODEL: NORMAL
MDC_IDC_LEAD_POLARITY_TYPE: NORMAL
MDC_IDC_LEAD_POLARITY_TYPE: NORMAL
MDC_IDC_LEAD_SERIAL: NORMAL
MDC_IDC_LEAD_SERIAL: NORMAL
MDC_IDC_LEAD_SPECIAL_FUNCTION: NORMAL
MDC_IDC_MSMT_BATTERY_DTM: NORMAL
MDC_IDC_MSMT_BATTERY_IMPEDANCE: 1544 OHM
MDC_IDC_MSMT_BATTERY_REMAINING_LONGEVITY: 54 MO
MDC_IDC_MSMT_BATTERY_STATUS: NORMAL
MDC_IDC_MSMT_BATTERY_VOLTAGE: 2.75 V
MDC_IDC_MSMT_LEADCHNL_RA_IMPEDANCE_VALUE: 441 OHM
MDC_IDC_MSMT_LEADCHNL_RA_PACING_THRESHOLD_AMPLITUDE: 0.5 V
MDC_IDC_MSMT_LEADCHNL_RA_PACING_THRESHOLD_PULSEWIDTH: 0.4 MS
MDC_IDC_MSMT_LEADCHNL_RV_IMPEDANCE_VALUE: 714 OHM
MDC_IDC_MSMT_LEADCHNL_RV_PACING_THRESHOLD_AMPLITUDE: 1.38 V
MDC_IDC_MSMT_LEADCHNL_RV_PACING_THRESHOLD_PULSEWIDTH: 0.4 MS
MDC_IDC_PG_IMPLANT_DTM: NORMAL
MDC_IDC_PG_MFG: NORMAL
MDC_IDC_PG_MODEL: NORMAL
MDC_IDC_PG_SERIAL: NORMAL
MDC_IDC_PG_TYPE: NORMAL
MDC_IDC_SESS_CLINIC_NAME: NORMAL
MDC_IDC_SESS_DTM: NORMAL
MDC_IDC_SESS_TYPE: NORMAL
MDC_IDC_SET_BRADY_AT_MODE_SWITCH_MODE: NORMAL
MDC_IDC_SET_BRADY_AT_MODE_SWITCH_RATE: 175 {BEATS}/MIN
MDC_IDC_SET_BRADY_LOWRATE: 50 {BEATS}/MIN
MDC_IDC_SET_BRADY_MAX_SENSOR_RATE: 130 {BEATS}/MIN
MDC_IDC_SET_BRADY_MAX_TRACKING_RATE: 130 {BEATS}/MIN
MDC_IDC_SET_BRADY_MODE: NORMAL
MDC_IDC_SET_BRADY_PAV_DELAY_LOW: 150 MS
MDC_IDC_SET_BRADY_SAV_DELAY_LOW: 120 MS
MDC_IDC_SET_LEADCHNL_RA_PACING_AMPLITUDE: 1.5 V
MDC_IDC_SET_LEADCHNL_RA_PACING_CAPTURE_MODE: NORMAL
MDC_IDC_SET_LEADCHNL_RA_PACING_POLARITY: NORMAL
MDC_IDC_SET_LEADCHNL_RA_PACING_PULSEWIDTH: 0.4 MS
MDC_IDC_SET_LEADCHNL_RA_SENSING_POLARITY: NORMAL
MDC_IDC_SET_LEADCHNL_RA_SENSING_SENSITIVITY: 0.5 MV
MDC_IDC_SET_LEADCHNL_RV_PACING_AMPLITUDE: 3.5 V
MDC_IDC_SET_LEADCHNL_RV_PACING_CAPTURE_MODE: NORMAL
MDC_IDC_SET_LEADCHNL_RV_PACING_POLARITY: NORMAL
MDC_IDC_SET_LEADCHNL_RV_PACING_PULSEWIDTH: 0.4 MS
MDC_IDC_SET_LEADCHNL_RV_SENSING_POLARITY: NORMAL
MDC_IDC_SET_LEADCHNL_RV_SENSING_SENSITIVITY: 4 MV
MDC_IDC_SET_ZONE_DETECTION_INTERVAL: 333.33 MS
MDC_IDC_SET_ZONE_DETECTION_INTERVAL: 342.86 MS
MDC_IDC_SET_ZONE_STATUS: NORMAL
MDC_IDC_SET_ZONE_STATUS: NORMAL
MDC_IDC_SET_ZONE_TYPE: NORMAL
MDC_IDC_SET_ZONE_TYPE: NORMAL
MDC_IDC_SET_ZONE_VENDOR_TYPE: NORMAL
MDC_IDC_SET_ZONE_VENDOR_TYPE: NORMAL
MDC_IDC_STAT_AT_BURDEN_PERCENT: 0.1 %
MDC_IDC_STAT_AT_DTM_END: NORMAL
MDC_IDC_STAT_AT_DTM_START: NORMAL
MDC_IDC_STAT_AT_MODE_SW_COUNT: 68
MDC_IDC_STAT_BRADY_AP_VP_PERCENT: 0 %
MDC_IDC_STAT_BRADY_AP_VS_PERCENT: 3 %
MDC_IDC_STAT_BRADY_AS_VP_PERCENT: 0 %
MDC_IDC_STAT_BRADY_AS_VS_PERCENT: 96 %
MDC_IDC_STAT_BRADY_DTM_END: NORMAL
MDC_IDC_STAT_BRADY_DTM_START: NORMAL
MDC_IDC_STAT_EPISODE_RECENT_COUNT: 2
MDC_IDC_STAT_EPISODE_RECENT_COUNT: 9
MDC_IDC_STAT_EPISODE_RECENT_COUNT_DTM_END: NORMAL
MDC_IDC_STAT_EPISODE_RECENT_COUNT_DTM_END: NORMAL
MDC_IDC_STAT_EPISODE_RECENT_COUNT_DTM_START: NORMAL
MDC_IDC_STAT_EPISODE_RECENT_COUNT_DTM_START: NORMAL
MDC_IDC_STAT_EPISODE_TYPE: NORMAL
MDC_IDC_STAT_EPISODE_TYPE: NORMAL

## 2024-12-19 ENCOUNTER — OFFICE VISIT (OUTPATIENT)
Dept: FAMILY MEDICINE | Facility: OTHER | Age: 69
End: 2024-12-19
Payer: COMMERCIAL

## 2024-12-19 VITALS
BODY MASS INDEX: 25.91 KG/M2 | HEART RATE: 68 BPM | DIASTOLIC BLOOD PRESSURE: 60 MMHG | RESPIRATION RATE: 20 BRPM | SYSTOLIC BLOOD PRESSURE: 110 MMHG | OXYGEN SATURATION: 96 % | HEIGHT: 68 IN | TEMPERATURE: 98.6 F | WEIGHT: 171 LBS

## 2024-12-19 DIAGNOSIS — I48.0 PAROXYSMAL ATRIAL FIBRILLATION (H): ICD-10-CM

## 2024-12-19 DIAGNOSIS — Z12.4 SCREENING FOR MALIGNANT NEOPLASM OF CERVIX: ICD-10-CM

## 2024-12-19 DIAGNOSIS — N94.10 DYSPAREUNIA IN FEMALE: ICD-10-CM

## 2024-12-19 DIAGNOSIS — R53.83 FATIGUE, UNSPECIFIED TYPE: ICD-10-CM

## 2024-12-19 DIAGNOSIS — F52.6: ICD-10-CM

## 2024-12-19 DIAGNOSIS — Z00.00 ENCOUNTER FOR MEDICARE ANNUAL WELLNESS EXAM: Primary | ICD-10-CM

## 2024-12-19 LAB
ANION GAP SERPL CALCULATED.3IONS-SCNC: 12 MMOL/L (ref 7–15)
BUN SERPL-MCNC: 19.3 MG/DL (ref 8–23)
CALCIUM SERPL-MCNC: 9.4 MG/DL (ref 8.8–10.4)
CHLORIDE SERPL-SCNC: 103 MMOL/L (ref 98–107)
CREAT SERPL-MCNC: 0.89 MG/DL (ref 0.51–0.95)
EGFRCR SERPLBLD CKD-EPI 2021: 71 ML/MIN/1.73M2
ERYTHROCYTE [DISTWIDTH] IN BLOOD BY AUTOMATED COUNT: 14.1 % (ref 10–15)
GLUCOSE SERPL-MCNC: 85 MG/DL (ref 70–99)
HCO3 SERPL-SCNC: 26 MMOL/L (ref 22–29)
HCT VFR BLD AUTO: 40.4 % (ref 35–47)
HGB BLD-MCNC: 13.9 G/DL (ref 11.7–15.7)
MCH RBC QN AUTO: 31 PG (ref 26.5–33)
MCHC RBC AUTO-ENTMCNC: 34.4 G/DL (ref 31.5–36.5)
MCV RBC AUTO: 90 FL (ref 78–100)
PLATELET # BLD AUTO: 184 10E3/UL (ref 150–450)
POTASSIUM SERPL-SCNC: 4.3 MMOL/L (ref 3.4–5.3)
RBC # BLD AUTO: 4.49 10E6/UL (ref 3.8–5.2)
SODIUM SERPL-SCNC: 141 MMOL/L (ref 135–145)
T4 FREE SERPL-MCNC: 1.31 NG/DL (ref 0.9–1.7)
TSH SERPL DL<=0.005 MIU/L-ACNC: 5.38 UIU/ML (ref 0.3–4.2)
WBC # BLD AUTO: 4.8 10E3/UL (ref 4–11)

## 2024-12-19 PROCEDURE — 90480 ADMN SARSCOV2 VAC 1/ONLY CMP: CPT

## 2024-12-19 PROCEDURE — G0145 SCR C/V CYTO,THINLAYER,RESCR: HCPCS

## 2024-12-19 PROCEDURE — 80048 BASIC METABOLIC PNL TOTAL CA: CPT

## 2024-12-19 PROCEDURE — 85027 COMPLETE CBC AUTOMATED: CPT

## 2024-12-19 PROCEDURE — 90662 IIV NO PRSV INCREASED AG IM: CPT

## 2024-12-19 PROCEDURE — 84443 ASSAY THYROID STIM HORMONE: CPT

## 2024-12-19 PROCEDURE — G0439 PPPS, SUBSEQ VISIT: HCPCS

## 2024-12-19 PROCEDURE — 91320 SARSCV2 VAC 30MCG TRS-SUC IM: CPT

## 2024-12-19 PROCEDURE — 84439 ASSAY OF FREE THYROXINE: CPT

## 2024-12-19 PROCEDURE — G0008 ADMIN INFLUENZA VIRUS VAC: HCPCS

## 2024-12-19 PROCEDURE — 36415 COLL VENOUS BLD VENIPUNCTURE: CPT

## 2024-12-19 RX ORDER — WARFARIN SODIUM 5 MG/1
TABLET ORAL
Qty: 135 TABLET | Refills: 1 | Status: SHIPPED | OUTPATIENT
Start: 2024-12-19

## 2024-12-19 RX ORDER — ACETAMINOPHEN 500 MG
500-1000 TABLET ORAL EVERY 4 HOURS PRN
COMMUNITY

## 2024-12-19 SDOH — HEALTH STABILITY: PHYSICAL HEALTH: ON AVERAGE, HOW MANY DAYS PER WEEK DO YOU ENGAGE IN MODERATE TO STRENUOUS EXERCISE (LIKE A BRISK WALK)?: 0 DAYS

## 2024-12-19 ASSESSMENT — SOCIAL DETERMINANTS OF HEALTH (SDOH): HOW OFTEN DO YOU GET TOGETHER WITH FRIENDS OR RELATIVES?: THREE TIMES A WEEK

## 2024-12-19 NOTE — LETTER
To whom it may concern:    Tatiana Skelton was seen today in our clinic for Medicare Annual Wellness visit. She is up to date on her healthcare maintenance.    Brandon Poole,

## 2024-12-19 NOTE — PROGRESS NOTES
"Preventive Care Visit  [facility]  Brandon Daljitneiljuan jruthannDO, Family Practice  Dec 19, 2024      Assessment & Plan     Encounter for Medicare annual wellness exam    Fatigue, unspecified type  Labs to rule out anemia and thyroid disorder.  - BASIC METABOLIC PANEL; Future  - CBC with platelets; Future  - TSH with free T4 reflex; Future  - BASIC METABOLIC PANEL  - CBC with platelets  - TSH with free T4 reflex  - T4 free    Psychologic painful sexual act of female  Dyspareunia in female  Suspect pelvic floor dysfunction at least contributing.  - Physical Therapy  Referral; Future    Screening for malignant neoplasm of cervix  Patient with Fhx of uterine malignancy  - Gynecologic Cytology (PAP Smear); Future  - Gynecologic Cytology (PAP Smear)  - HPV Hold (Lab Only)    BMI  Estimated body mass index is 26 kg/m  as calculated from the following:    Height as of this encounter: 1.727 m (5' 8\").    Weight as of this encounter: 77.6 kg (171 lb).       Counseling  Appropriate preventive services were addressed with this patient via screening, questionnaire, or discussion as appropriate for fall prevention, nutrition, physical activity, Tobacco-use cessation, social engagement, weight loss and cognition.  Checklist reviewing preventive services available has been given to the patient.  Reviewed patient's diet, addressing concerns and/or questions.   The patient was instructed to see the dentist every 6 months.   She is at risk for psychosocial distress and has been provided with information to reduce risk.   Discussed possible causes of fatigue. The patient was provided with written information regarding signs of hearing loss.   Information on urinary incontinence and treatment options given to patient.     Tu Etienne is a 69 year old, presenting for the following:  Medicare Visit        12/19/2024     3:40 PM   Additional Questions   Roomed by sheeba ROQUE  Patient is doing " relatively well. She does report inability to decrease her weight recently despite lifestyle modifications.    Health Care Directive  Patient does not have a Health Care Directive: Discussed advance care planning with patient; information given to patient to review.      12/19/2024   General Health   How would you rate your overall physical health? (!) FAIR   Feel stress (tense, anxious, or unable to sleep) Rather much   (!) STRESS CONCERN      12/19/2024   Nutrition   Diet: Regular (no restrictions)    Low salt    Breakfast skipped       Multiple values from one day are sorted in reverse-chronological order         12/19/2024   Exercise   Days per week of moderate/strenous exercise 0 days   (!) EXERCISE CONCERN      12/19/2024   Social Factors   Frequency of gathering with friends or relatives Three times a week   Worry food won't last until get money to buy more No   Food not last or not have enough money for food? No   Do you have housing? (Housing is defined as stable permanent housing and does not include staying ouside in a car, in a tent, in an abandoned building, in an overnight shelter, or couch-surfing.) Yes   Are you worried about losing your housing? No   Lack of transportation? No   Unable to get utilities (heat,electricity)? No         12/19/2024   Fall Risk   Fallen 2 or more times in the past year? No   Trouble with walking or balance? No          12/19/2024   Activities of Daily Living- Home Safety   Needs help with the following daily activites None of the above   Safety concerns in the home None of the above         12/19/2024   Dental   Dentist two times every year? (!) NO         12/19/2024   Hearing Screening   Hearing concerns? (!) I FEEL THAT PEOPLE ARE MUMBLING OR NOT SPEAKING CLEARLY.    (!) I NEED TO ASK PEOPLE TO SPEAK UP OR REPEAT THEMSELVES.    (!) IT'S HARDER TO UNDERSTAND WOMEN'S VOICES THAN MEN'S VOICES.    (!) IT'S HARD TO FOLLOW A CONVERSATION IN A NOISY RESTAURANT OR CROWDED ROOM.     (!) TROUBLE UNDESTANDING A SPEAKER IN A PUBLIC MEETING OR [employer] SERVICE.    (!) TROUBLE FOLLOWING DIALOGUE IN THE THEATHER.    (!) TROUBLE UNDERSTANDING SOFT OR WHISPERED SPEECH.    (!) TROUBLE UNDERSTANDING SPEECH ON THE TELEPHONE       Multiple values from one day are sorted in reverse-chronological order         12/19/2024   Driving Risk Screening   Patient/family members have concerns about driving No         12/19/2024   General Alertness/Fatigue Screening   Have you been more tired than usual lately? (!) YES         12/19/2024   Urinary Incontinence Screening   Bothered by leaking urine in past 6 months Yes         12/19/2024   TB Screening   Were you born outside of the [place]? No         Today's PHQ-2 Score:       12/19/2024     3:35 PM   PHQ-2 ( 1999 [employer])   Q1: Little interest or pleasure in doing things 1   Q2: Feeling down, depressed or hopeless 0   PHQ-2 Score 1    Q1: Little interest or pleasure in doing things Several days   Q2: Feeling down, depressed or hopeless Not at all   PHQ-2 Score 1       Patient-reported           12/19/2024   Substance Use   Alcohol more than 3/day or more than 7/wk No   Do you have a current opioid prescription? No   How severe/bad is pain from 1 to 10? 3/10   Do you use any other substances recreationally? No     Social History     Tobacco Use    Smoking status: Never    Smokeless tobacco: Never    Tobacco comments:     Lives in smoke free household   Vaping Use    Vaping status: Never Used   Substance Use Topics    Alcohol use: Yes     Comment: twice a month    Drug use: No           6/27/2023   LAST FHS-7 RESULTS   1st degree relative breast or ovarian cancer Yes    Any relative bilateral breast cancer No    Any male have breast cancer No    Any ONE woman have BOTH breast AND ovarian cancer No    Any woman with breast cancer before 50yrs No    2 or more relatives with breast AND/OR ovarian cancer No    2 or more relatives with breast AND/OR bowel cancer No         Proxy-reported        Mammogram Screening - Mammogram every 1-2 years updated in Health Maintenance based on mutual decision making      History of abnormal Pap smear: No - age 21 - 67 - Patient with other risk factor requiring more frequent screening - see link Cervical Cytology Screening Guidelines        Latest Ref Rng & Units 5/27/2022    11:25 AM 12/17/2018     8:51 AM 12/17/2018     8:44 AM   PAP / HPV   PAP  Negative for Intraepithelial Lesion or Malignancy (NILM)      PAP (Historical)   NIL     HPV 16 DNA Negative Negative   Negative    HPV 18 DNA Negative Negative   Negative    Other HR HPV Negative Negative   Negative      ASCVD Risk   The 10-year ASCVD risk score (Nitin NOWAK, et al., 2019) is: 4.4%    Values used to calculate the score:      Age: 69 years      Sex: Female      Is Non- : No      Diabetic: No      Tobacco smoker: No      Systolic Blood Pressure: 110 mmHg      Is BP treated: No      HDL Cholesterol: 84 mg/dL      Total Cholesterol: 188 mg/dL        Reviewed and updated as needed this visit by Provider                    Current providers sharing in care for this patient include:  Patient Care Team:  Caleb Wild DO as PCP - General (Internal Medicine)  Davonte Brown MD (Family Practice)  Quentin Andrea MD as MD (Urology)  Rosalinda Golden AuD as Audiologist (Audiology)  Deandre Castro MD as MD (Otolaryngology)  Galina Machado PA-C as Physician Assistant (Dermatology)  Deandre Castro MD as Assigned Surgical Provider  Yadira Douglas MD as Assigned PCP  Isaac Blair MD as Assigned Heart and Vascular Provider    The following health maintenance items are reviewed in Epic and correct as of today:  Health Maintenance   Topic Date Due    RSV VACCINE (1 - Risk 60-74 years 1-dose series) Never done    ZOSTER IMMUNIZATION (3 of 3) 02/14/2019    LIPID  04/25/2024    ANNUAL REVIEW OF HM ORDERS   "06/27/2024    MEDICARE ANNUAL WELLNESS VISIT  08/23/2024    INFLUENZA VACCINE (1) 09/01/2024    COVID-19 Vaccine (6 - 2024-25 season) 09/01/2024    BMP  12/19/2024    EYE EXAM  11/06/2025    FALL RISK ASSESSMENT  12/19/2025    MAMMO SCREENING  07/03/2026    GLUCOSE  12/19/2026    COLORECTAL CANCER SCREENING  08/20/2028    ADVANCE CARE PLANNING  12/27/2028    DTAP/TDAP/TD IMMUNIZATION (3 - Td or Tdap) 12/08/2032    DEXA  07/18/2038    HEPATITIS C SCREENING  Completed    PHQ-2 (once per calendar year)  Completed    Pneumococcal Vaccine: 50+ Years  Completed    HPV IMMUNIZATION  Aged Out    MENINGITIS IMMUNIZATION  Aged Out    RSV MONOCLONAL ANTIBODY  Aged Out    PAP  Discontinued        Objective    Exam  /60 (BP Location: Right arm, Patient Position: Sitting, Cuff Size: Adult Regular)   Pulse 68   Temp 98.6  F (37  C) (Temporal)   Resp 20   Ht 1.727 m (5' 8\")   Wt 77.6 kg (171 lb)   LMP  (LMP Unknown)   SpO2 96%   BMI 26.00 kg/m     Estimated body mass index is 26 kg/m  as calculated from the following:    Height as of this encounter: 1.727 m (5' 8\").    Weight as of this encounter: 77.6 kg (171 lb).    Physical Exam  Constitutional:       Appearance: Normal appearance.   Cardiovascular:      Rate and Rhythm: Normal rate and regular rhythm.   Pulmonary:      Effort: Pulmonary effort is normal.      Breath sounds: Normal breath sounds.   Abdominal:      General: Abdomen is flat.      Palpations: Abdomen is soft.      Tenderness: There is no abdominal tenderness.   Skin:     General: Skin is warm and dry.   Neurological:      Mental Status: She is alert.   Psychiatric:         Mood and Affect: Mood normal.         Behavior: Behavior normal.             12/19/2024   Mini Cog   Clock Draw Score 2 Normal   3 Item Recall 2 objects recalled   Mini Cog Total Score 4              Signed Electronically by: Brandon Poole DO    "

## 2025-01-01 ENCOUNTER — MYC MEDICAL ADVICE (OUTPATIENT)
Dept: FAMILY MEDICINE | Facility: OTHER | Age: 70
End: 2025-01-01
Payer: COMMERCIAL

## 2025-01-01 DIAGNOSIS — E03.8 SUBCLINICAL HYPOTHYROIDISM: Primary | ICD-10-CM

## 2025-01-02 RX ORDER — LEVOTHYROXINE SODIUM 50 UG/1
50 TABLET ORAL
Qty: 30 TABLET | Refills: 2 | Status: SHIPPED | OUTPATIENT
Start: 2025-01-02

## 2025-01-03 NOTE — TELEPHONE ENCOUNTER
Patient started on low-dose levothyroxine 50 mcg for subclinical hypothyroidism. Will plan to have her complete repeat labs within 6 weeks.    Brandon Poole DO

## 2025-01-13 ENCOUNTER — LAB (OUTPATIENT)
Dept: LAB | Facility: CLINIC | Age: 70
End: 2025-01-13
Payer: COMMERCIAL

## 2025-01-13 ENCOUNTER — ANTICOAGULATION THERAPY VISIT (OUTPATIENT)
Dept: ANTICOAGULATION | Facility: CLINIC | Age: 70
End: 2025-01-13

## 2025-01-13 ENCOUNTER — OFFICE VISIT (OUTPATIENT)
Dept: CARDIOLOGY | Facility: CLINIC | Age: 70
End: 2025-01-13
Payer: COMMERCIAL

## 2025-01-13 VITALS
TEMPERATURE: 98.9 F | HEIGHT: 68 IN | SYSTOLIC BLOOD PRESSURE: 113 MMHG | WEIGHT: 168.5 LBS | RESPIRATION RATE: 16 BRPM | HEART RATE: 80 BPM | DIASTOLIC BLOOD PRESSURE: 75 MMHG | OXYGEN SATURATION: 99 % | BODY MASS INDEX: 25.54 KG/M2

## 2025-01-13 DIAGNOSIS — E78.00 HYPERCHOLESTEROLEMIA: ICD-10-CM

## 2025-01-13 DIAGNOSIS — I48.0 PAROXYSMAL ATRIAL FIBRILLATION (H): Primary | ICD-10-CM

## 2025-01-13 DIAGNOSIS — I25.10 CORONARY ARTERY DISEASE INVOLVING NATIVE CORONARY ARTERY OF NATIVE HEART WITHOUT ANGINA PECTORIS: Primary | ICD-10-CM

## 2025-01-13 DIAGNOSIS — I25.10 CORONARY ARTERY DISEASE INVOLVING NATIVE CORONARY ARTERY OF NATIVE HEART WITHOUT ANGINA PECTORIS: ICD-10-CM

## 2025-01-13 DIAGNOSIS — I48.0 PAROXYSMAL ATRIAL FIBRILLATION (H): ICD-10-CM

## 2025-01-13 LAB
CHOLEST SERPL-MCNC: 183 MG/DL
FASTING STATUS PATIENT QL REPORTED: YES
HDLC SERPL-MCNC: 97 MG/DL
INR BLD: 1.8 (ref 0.9–1.1)
LDLC SERPL CALC-MCNC: 73 MG/DL
NONHDLC SERPL-MCNC: 86 MG/DL
TRIGL SERPL-MCNC: 63 MG/DL

## 2025-01-13 PROCEDURE — 36415 COLL VENOUS BLD VENIPUNCTURE: CPT

## 2025-01-13 PROCEDURE — 80061 LIPID PANEL: CPT

## 2025-01-13 PROCEDURE — 85610 PROTHROMBIN TIME: CPT

## 2025-01-13 PROCEDURE — 99214 OFFICE O/P EST MOD 30 MIN: CPT | Performed by: INTERNAL MEDICINE

## 2025-01-13 ASSESSMENT — PATIENT HEALTH QUESTIONNAIRE - PHQ9: SUM OF ALL RESPONSES TO PHQ QUESTIONS 1-9: 10

## 2025-01-13 ASSESSMENT — PAIN SCALES - GENERAL: PAINLEVEL_OUTOF10: NO PAIN (0)

## 2025-01-13 NOTE — LETTER
1/13/2025    Caleb Wild, DO  [address] [place] [address]    RE: Tatiana Skelton       Dear Colleague,     I had the pleasure of seeing Tatiana Skelton in the [facility].  HISTORY:    Tatiana Skelton is a pleasant 69-year-old female with a history of asymptomatic paroxysmal atrial fibrillation noted on pacemaker interrogation, previous bradycardia with syncope resulting in placement of that pacemaker in 2002 with generator replacement in 2012, coronary artery disease with elevated calcium score in June 2021, hyperlipidemia, and venous insufficiency with previous vein procedures.  She is here today for a routine follow-up visit.  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    Leah reports that she feels that she overall is doing well.  She reports that she has been having trouble with her sleeping.  When she gets to sleep she sleeps soundly but she has trouble falling asleep roughly every other night.  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    Jaimie is using an Apple Watch which always reports less than 2% atrial fibrillation.  Her most recent pacemaker interrogation showed that she is atrial paced just 3% of the time and ventricular paced less than 1%.  The previous 6 months she had 6 mode switches wanting to less than 1% of the time.  The longest episode of atrial fibrillation was 6 hours 49 minutes with ventricular rates 80 to 200 bpm.  She is using warfarin for stroke prophylaxis.    At the time of our last visit Jaimie was struggling with peripheral edema but she is now on Lasix.  She uses 1 tablet most days but occasionally uses to especially if she is going to be on her feet a lot.  This has done a great job of controlling her edema and her most recent lab studies show that potassium and creatinine remain normal.  At the time of our last visit she was working 2  jobs but has cut back to just 1 job, which seems to have helped as well.      ASSESSMENT/PLAN:    1.  Paroxysmal atrial fibrillation using warfarin for stroke prophylaxis.  She is in atrial fibs less than 1% of the time based on her pacemaker interrogation and is completely unaware of it.  Her heart rate is little on the high side when she has her atrial fibrillation but it is so infrequent that this really does not matter.  She will let us know if her Apple Watch starts telling her she is having frequent atrial fibs.  2.  Elevated calcium score.  Risk factors are all being well addressed with good blood pressure and cholesterol.  3.  Hyperlipidemia using Crestor 40 mg daily with good LDL control.  Patient reports that she fasted today and wants to have her fasting lipid profile checked.      Thank you for inviting me to participate in the care of your patient.  Please don't hesitate to call if I can be of further assistance.  32 minutes were spent today reviewing the chart and other records, interviewing and examining the patient, and documenting our visit.    Chart documentation was completed, in part, with [employer] voice-recognition software. Even though reviewed, some grammatical, spelling, and word errors may remain.       Orders Placed This Encounter   Procedures     Lipid Profile     Follow-Up with Cardiology ROBERT     No orders of the defined types were placed in this encounter.    There are no discontinued medications.    10 year ASCVD risk: The 10-year ASCVD risk score (Nitin NOWAK, et al., 2019) is: 4.6%    Values used to calculate the score:      Age: 69 years      Sex: Female      Is Non- : No      Diabetic: No      Tobacco smoker: No      Systolic Blood Pressure: 113 mmHg      Is BP treated: No      HDL Cholesterol: 84 mg/dL      Total Cholesterol: 188 mg/dL    Encounter Diagnoses   Name Primary?     Coronary artery disease involving native coronary artery of native heart without  angina pectoris Yes     Hypercholesterolemia        CURRENT MEDICATIONS:  Current Outpatient Medications   Medication Sig Dispense Refill     acetaminophen (TYLENOL) 500 MG tablet Take 500-1,000 mg by mouth every 4 hours as needed for mild pain.       calcium carbonate (OS-SONIA) 500 MG tablet Take 1 tablet by mouth 2 times daily       cholecalciferol 25 MCG (1000 UT) TABS        Coenzyme Q10 (COQ10 PO) Take by mouth.       furosemide (LASIX) 20 MG tablet Take 2 tablets (40 mg) by mouth daily 180 tablet 2     hydrocortisone 2.5 % cream Apply topically 2 times daily as needed for rash. 30 g 0     levothyroxine (SYNTHROID/LEVOTHROID) 50 MCG tablet Take 1 tablet (50 mcg) by mouth every morning (before breakfast). 30 tablet 2     mometasone (ELOCON) 0.1 % external cream Apply topically daily as needed (itch) 45 g 1     rosuvastatin (CRESTOR) 40 MG tablet Take 1 tablet (40 mg) by mouth daily 90 tablet 3     warfarin ANTICOAGULANT (JANTOVEN ANTICOAGULANT) 5 MG tablet TAKE 2 TABLETS BY MOUTH ON MONDAY AND TAKE 1.5 TABLETSON ALL OTHER DAYS AS DIRECTED BY COUMADIN CLINIC 135 tablet 1     cetirizine (ZYRTEC) 10 MG tablet Take 1 tablet (10 mg) by mouth daily. (Patient not taking: Reported on 1/13/2025) 30 tablet 3     ketoconazole (NIZORAL) 2 % external shampoo Apply topically daily as needed (Patient not taking: Reported on 1/13/2025)       magnesium 250 MG tablet Take 1 tablet by mouth daily. (Patient not taking: Reported on 1/13/2025)       mometasone (ELOCON) 0.1 % external ointment Apply sparingly to affected area twice daily as needed.  Do not apply to face. (Patient not taking: Reported on 1/13/2025) 45 g 1     predniSONE (DELTASONE) 20 MG tablet Take 1 tablet (20 mg) by mouth daily. (Patient not taking: Reported on 1/13/2025) 10 tablet 0     tiZANidine (ZANAFLEX) 2 MG tablet Take 1 tablet (2 mg) by mouth 3 times daily. (Patient not taking: Reported on 1/13/2025) 7 tablet 0     triamcinolone (KENALOG) 0.1 % external  lotion Apply topically 2 times daily (Patient not taking: Reported on 1/13/2025)         ALLERGIES     Allergies   Allergen Reactions     Midazolam Hcl      Hypersensitive     Morphine Sulfate Anxiety       PAST MEDICAL HISTORY:  Past Medical History:   Diagnosis Date     Cervical high risk HPV (human papillomavirus) test positive 10/2/14, 3/6/16     Elevated cholesterol      Generalized osteoarthrosis, unspecified site 1/30/2015     Open angle with borderline findings, low risk 1/9/2014     Sick sinus syndrome (H)     PM implant since 2002     Syncope        PAST SURGICAL HISTORY:  Past Surgical History:   Procedure Laterality Date     ABDOMEN SURGERY      Tubal     BUNIONECTOMY JACKIE BILATERAL       COLONOSCOPY N/A 08/20/2018    Normal, repeat 10 years     HC OR IMPLANT BREAST       IMPLANT IMPLANTABLE CARDIOVERTER DEFIBRILLATOR  06/04/2012    she does not have an ICD. She has a pacemker.     IMPLANT PACEMAKER  01/01/2002    Dual chamber medtronic for sick sinus snyndrome     TUBAL LIGATION  01/01/2000       FAMILY HISTORY:  Family History   Problem Relation Age of Onset     Respiratory Mother 61        COPD     Heart Disease Mother      Eye Disorder Mother         cataract surgery     Lipids Mother      Diabetes Mother      Hypertension Mother      Eye Disorder Father      Glaucoma Father 80        takes drops, frequent apt - suspect it is glaucoma     Hypertension Sister      Cancer Sister      Uterine Cancer Sister      Lipids Sister      Lipids Brother      Hypertension Brother      Cerebrovascular Disease No family hx of      Thyroid Disease No family hx of      Macular Degeneration No family hx of        SOCIAL HISTORY:  Social History     Socioeconomic History     Marital status: Single   Tobacco Use     Smoking status: Never     Smokeless tobacco: Never     Tobacco comments:     Lives in smoke free household   Vaping Use     Vaping status: Never Used   Substance and Sexual Activity     Alcohol use: Yes      Comment: twice a month     Drug use: No     Sexual activity: Yes     Partners: Male     Birth control/protection: Surgical, Female Surgical     Comment: tubal ligation     Social Drivers of Health     Financial Resource Strain: Low Risk  (12/19/2024)    Financial Resource Strain      Within the past 12 months, have you or your family members you live with been unable to get utilities (heat, electricity) when it was really needed?: No   Food Insecurity: Low Risk  (12/19/2024)    Food Insecurity      Within the past 12 months, did you worry that your food would run out before you got money to buy more?: No      Within the past 12 months, did the food you bought just not last and you didn t have money to get more?: No   Transportation Needs: Low Risk  (12/19/2024)    Transportation Needs      Within the past 12 months, has lack of transportation kept you from medical appointments, getting your medicines, non-medical meetings or appointments, work, or from getting things that you need?: No   Physical Activity: Unknown (12/19/2024)    Exercise Vital Sign      Days of Exercise per Week: 0 days   Stress: Stress Concern Present (12/19/2024)    [employer] [employer] - Occupational Stress Questionnaire      Feeling of Stress : Rather much   Social Connections: Unknown (12/19/2024)    Social Connection and Isolation Panel [NHANES]      Frequency of Social Gatherings with Friends and Family: Three times a week   Interpersonal Safety: Low Risk  (8/27/2024)    Interpersonal Safety      Do you feel physically and emotionally safe where you currently live?: Yes      Within the past 12 months, have you been hit, slapped, kicked or otherwise physically hurt by someone?: No      Within the past 12 months, have you been humiliated or emotionally abused in other ways by your partner or ex-partner?: No   Housing Stability: Low Risk  (12/19/2024)    Housing Stability      Do you have housing? : Yes      Are you worried  "about losing your housing?: No       Review of Systems:  Skin:        Eyes:       ENT:       Respiratory:  Negative    Cardiovascular:  dizziness    Gastroenterology:      Genitourinary:       Musculoskeletal:       Neurologic:  Negative    Psychiatric:       Heme/Lymph/Imm:       Endocrine:         Physical Exam:  Vitals: /75 (BP Location: Right arm, Patient Position: Sitting, Cuff Size: Adult Regular)   Pulse 80   Temp 98.9  F (37.2  C) (Temporal)   Resp 16   Ht 1.72 m (5' 7.72\")   Wt 76.4 kg (168 lb 8 oz)   LMP  (LMP Unknown)   SpO2 99%   BMI 25.83 kg/m      Constitutional:  cooperative, alert and oriented, well developed, well nourished, in no acute distress        Skin:  warm and dry to the touch, no apparent skin lesions or masses noted        Head:  normocephalic, no masses or lesions        Eyes:           ENT:  no pallor or cyanosis, dentition good        Neck:  carotid pulses are full and equal bilaterally, JVP normal, no carotid bruit        Chest:  normal breath sounds, clear to auscultation, normal A-P diameter, normal symmetry, normal respiratory excursion, no use of accessory muscles        Cardiac: regular rhythm, normal S1/S2, no S3 or S4, apical impulse not displaced, no murmurs, gallops or rubs                  Abdomen:  abdomen soft, BS normoactive        Vascular: pulses full and equal                                      Extremities and Muscular Skeletal:  no edema           Neurological:  no gross motor deficits        Psych:  affect appropriate, oriented to time, person and place     Recent Lab Results:  LIPID RESULTS:  Lab Results   Component Value Date    CHOL 188 04/25/2023    CHOL 214 (H) 12/24/2018    HDL 84 04/25/2023    HDL 94 12/24/2018    LDL 72 04/25/2023     (H) 12/24/2018    TRIG 160 (H) 04/25/2023    TRIG 74 12/24/2018    CHOLHDLRATIO 2.1 08/25/2014       LIVER ENZYME RESULTS:  Lab Results   Component Value Date    AST 15 01/30/2015    ALT 24 04/25/2023    " "ALT 17 01/30/2015       CBC RESULTS:  Lab Results   Component Value Date    WBC 4.8 12/19/2024    WBC 4.3 01/30/2015    RBC 4.49 12/19/2024    RBC 4.62 01/30/2015    HGB 13.9 12/19/2024    HGB 14.3 01/30/2015    HCT 40.4 12/19/2024    HCT 40.4 01/30/2015    MCV 90 12/19/2024    MCV 87 01/30/2015    MCH 31.0 12/19/2024    MCH 31.0 01/30/2015    MCHC 34.4 12/19/2024    MCHC 35.4 01/30/2015    RDW 14.1 12/19/2024    RDW 13.6 01/30/2015     12/19/2024     01/30/2015       BMP RESULTS:  Lab Results   Component Value Date     12/19/2024     04/07/2016    POTASSIUM 4.3 12/19/2024    POTASSIUM 3.8 04/07/2016    CHLORIDE 103 12/19/2024    CHLORIDE 106 04/07/2016    CO2 26 12/19/2024    CO2 29 04/07/2016    ANIONGAP 12 12/19/2024    ANIONGAP 9 04/07/2016    GLC 85 12/19/2024    GLC 88 12/24/2018    BUN 19.3 12/19/2024    BUN 19 04/07/2016    CR 0.89 12/19/2024    CR 0.78 04/07/2016    GFRESTIMATED 71 12/19/2024    GFRESTIMATED 75 04/07/2016    GFRESTBLACK >90   GFR Calc   04/07/2016    SONIA 9.4 12/19/2024    SONIA 9.7 04/07/2016        A1C RESULTS:  No results found for: \"A1C\"    INR RESULTS:  Lab Results   Component Value Date    INR 2.9 (H) 11/27/2024    INR 2.2 (H) 10/16/2024         Isaac Blair MD, [facility]    CC  Referred Self, MD  No address on file                    Thank you for allowing me to participate in the care of your patient.      Sincerely,     Isaac Blair MD     [facility]  cc:   Referred MD Esdras  No address on file      "

## 2025-01-13 NOTE — PROGRESS NOTES
ANTICOAGULATION MANAGEMENT     Tatiana Skelton 69 year old female is on warfarin with subtherapeutic INR result. (Goal INR 2.0-3.0)    Recent labs: (last 7 days)     01/13/25  1353   INR 1.8*       ASSESSMENT     Source(s): Chart Review  Previous INR was Therapeutic last 2(+) visits  Medication, diet, health changes since last INR chart reviewed; none identified    I left a detailed voicemail with the orders reflected in flowsheet. I have also requested a call back if there have been any missed doses, concerns, illness, fever, or if there have been any changes in medications, activity level, or diet        PLAN     Recommended plan for no diet, medication or health factor changes affecting INR     Dosing Instructions: Continue your current warfarin dose with next INR in 2 weeks       Summary  As of 1/13/2025      Full warfarin instructions:  10 mg every Mon; 7.5 mg all other days   Next INR check:  1/27/2025               Detailed voice message left for Jaimie with dosing instructions and follow up date.   Sent [employer] message with dosing and follow up instructions    Contact [phone] to schedule and with any changes, questions or concerns.     Education provided: Please call back if any changes to your diet, medications or how you've been taking warfarin    Plan made per [facility] anticoagulation protocol    Lois Carpio RN  1/13/2025  Anticoagulation Clinic  [facility] for routing messages: jovon MEJIA  [facility] patient phone line: [phone]        _______________________________________________________________________     Anticoagulation Episode Summary       Current INR goal:  2.0-3.0   TTR:  89.9% (1 y)   Target end date:  Indefinite   Send INR reminders to:  LOU MEJIA    Indications    Paroxysmal atrial fibrillation (H) [I48.0]             Comments:  --             Anticoagulation Care Providers       Provider Role Specialty Phone number    Doris Fraser APRN CNP Referring Cardiovascular  Disease [phone]    Nicole Gray NP Referring Nurse Practitioner - Family [phone]    Yadira Douglas MD Referring Urgent Care [phone]    Flor Ernst PA-C Referring Family Medicine [phone]

## 2025-01-13 NOTE — PROGRESS NOTES
HISTORY:    Tatiana Skelton is a pleasant 69-year-old female with a history of asymptomatic paroxysmal atrial fibrillation noted on pacemaker interrogation, previous bradycardia with syncope resulting in placement of that pacemaker in 2002 with generator replacement in 2012, coronary artery disease with elevated calcium score in June 2021, hyperlipidemia, and venous insufficiency with previous vein procedures.  She is here today for a routine follow-up visit.  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    Leah reports that she feels that she overall is doing well.  She reports that she has been having trouble with her sleeping.  When she gets to sleep she sleeps soundly but she has trouble falling asleep roughly every other night.  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    Jaimie is using an Apple Watch which always reports less than 2% atrial fibrillation.  Her most recent pacemaker interrogation showed that she is atrial paced just 3% of the time and ventricular paced less than 1%.  The previous 6 months she had 6 mode switches wanting to less than 1% of the time.  The longest episode of atrial fibrillation was 6 hours 49 minutes with ventricular rates 80 to 200 bpm.  She is using warfarin for stroke prophylaxis.    At the time of our last visit Jaimie was struggling with peripheral edema but she is now on Lasix.  She uses 1 tablet most days but occasionally uses to especially if she is going to be on her feet a lot.  This has done a great job of controlling her edema and her most recent lab studies show that potassium and creatinine remain normal.  At the time of our last visit she was working 2 jobs but has cut back to just 1 job, which seems to have helped as well.      ASSESSMENT/PLAN:    1.  Paroxysmal atrial fibrillation using warfarin for stroke prophylaxis.  She is in atrial fibs less than 1% of the time  based on her pacemaker interrogation and is completely unaware of it.  Her heart rate is little on the high side when she has her atrial fibrillation but it is so infrequent that this really does not matter.  She will let us know if her Apple Watch starts telling her she is having frequent atrial fibs.  2.  Elevated calcium score.  Risk factors are all being well addressed with good blood pressure and cholesterol.  3.  Hyperlipidemia using Crestor 40 mg daily with good LDL control.  Patient reports that she fasted today and wants to have her fasting lipid profile checked.      Thank you for inviting me to participate in the care of your patient.  Please don't hesitate to call if I can be of further assistance.  32 minutes were spent today reviewing the chart and other records, interviewing and examining the patient, and documenting our visit.    Chart documentation was completed, in part, with [employer] voice-recognition software. Even though reviewed, some grammatical, spelling, and word errors may remain.       Orders Placed This Encounter   Procedures    Lipid Profile    Follow-Up with Cardiology ROBERT     No orders of the defined types were placed in this encounter.    There are no discontinued medications.    10 year ASCVD risk: The 10-year ASCVD risk score (Nitin NOWAK, et al., 2019) is: 4.6%    Values used to calculate the score:      Age: 69 years      Sex: Female      Is Non- : No      Diabetic: No      Tobacco smoker: No      Systolic Blood Pressure: 113 mmHg      Is BP treated: No      HDL Cholesterol: 84 mg/dL      Total Cholesterol: 188 mg/dL    Encounter Diagnoses   Name Primary?    Coronary artery disease involving native coronary artery of native heart without angina pectoris Yes    Hypercholesterolemia        CURRENT MEDICATIONS:  Current Outpatient Medications   Medication Sig Dispense Refill    acetaminophen (TYLENOL) 500 MG tablet Take 500-1,000 mg by mouth every 4 hours as  needed for mild pain.      calcium carbonate (OS-SNOIA) 500 MG tablet Take 1 tablet by mouth 2 times daily      cholecalciferol 25 MCG (1000 UT) TABS       Coenzyme Q10 (COQ10 PO) Take by mouth.      furosemide (LASIX) 20 MG tablet Take 2 tablets (40 mg) by mouth daily 180 tablet 2    hydrocortisone 2.5 % cream Apply topically 2 times daily as needed for rash. 30 g 0    levothyroxine (SYNTHROID/LEVOTHROID) 50 MCG tablet Take 1 tablet (50 mcg) by mouth every morning (before breakfast). 30 tablet 2    mometasone (ELOCON) 0.1 % external cream Apply topically daily as needed (itch) 45 g 1    rosuvastatin (CRESTOR) 40 MG tablet Take 1 tablet (40 mg) by mouth daily 90 tablet 3    warfarin ANTICOAGULANT (JANTOVEN ANTICOAGULANT) 5 MG tablet TAKE 2 TABLETS BY MOUTH ON MONDAY AND TAKE 1.5 TABLETSON ALL OTHER DAYS AS DIRECTED BY COUMADIN CLINIC 135 tablet 1    cetirizine (ZYRTEC) 10 MG tablet Take 1 tablet (10 mg) by mouth daily. (Patient not taking: Reported on 1/13/2025) 30 tablet 3    ketoconazole (NIZORAL) 2 % external shampoo Apply topically daily as needed (Patient not taking: Reported on 1/13/2025)      magnesium 250 MG tablet Take 1 tablet by mouth daily. (Patient not taking: Reported on 1/13/2025)      mometasone (ELOCON) 0.1 % external ointment Apply sparingly to affected area twice daily as needed.  Do not apply to face. (Patient not taking: Reported on 1/13/2025) 45 g 1    predniSONE (DELTASONE) 20 MG tablet Take 1 tablet (20 mg) by mouth daily. (Patient not taking: Reported on 1/13/2025) 10 tablet 0    tiZANidine (ZANAFLEX) 2 MG tablet Take 1 tablet (2 mg) by mouth 3 times daily. (Patient not taking: Reported on 1/13/2025) 7 tablet 0    triamcinolone (KENALOG) 0.1 % external lotion Apply topically 2 times daily (Patient not taking: Reported on 1/13/2025)         ALLERGIES     Allergies   Allergen Reactions    Midazolam Hcl      Hypersensitive    Morphine Sulfate Anxiety       PAST MEDICAL HISTORY:  Past Medical  History:   Diagnosis Date    Cervical high risk HPV (human papillomavirus) test positive 10/2/14, 3/6/16    Elevated cholesterol     Generalized osteoarthrosis, unspecified site 1/30/2015    Open angle with borderline findings, low risk 1/9/2014    Sick sinus syndrome (H)     PM implant since 2002    Syncope        PAST SURGICAL HISTORY:  Past Surgical History:   Procedure Laterality Date    ABDOMEN SURGERY      Tubal    BUNIONECTOMY JACKIE BILATERAL      COLONOSCOPY N/A 08/20/2018    Normal, repeat 10 years    HC OR IMPLANT BREAST      IMPLANT IMPLANTABLE CARDIOVERTER DEFIBRILLATOR  06/04/2012    she does not have an ICD. She has a pacemker.    IMPLANT PACEMAKER  01/01/2002    Dual chamber medtronic for sick sinus snyndrome    TUBAL LIGATION  01/01/2000       FAMILY HISTORY:  Family History   Problem Relation Age of Onset    Respiratory Mother 61        COPD    Heart Disease Mother     Eye Disorder Mother         cataract surgery    Lipids Mother     Diabetes Mother     Hypertension Mother     Eye Disorder Father     Glaucoma Father 80        takes drops, frequent apt - suspect it is glaucoma    Hypertension Sister     Cancer Sister     Uterine Cancer Sister     Lipids Sister     Lipids Brother     Hypertension Brother     Cerebrovascular Disease No family hx of     Thyroid Disease No family hx of     Macular Degeneration No family hx of        SOCIAL HISTORY:  Social History     Socioeconomic History    Marital status: Single   Tobacco Use    Smoking status: Never    Smokeless tobacco: Never    Tobacco comments:     Lives in smoke free household   Vaping Use    Vaping status: Never Used   Substance and Sexual Activity    Alcohol use: Yes     Comment: twice a month    Drug use: No    Sexual activity: Yes     Partners: Male     Birth control/protection: Surgical, Female Surgical     Comment: tubal ligation     Social Drivers of Health     Financial Resource Strain: Low Risk  (12/19/2024)    Financial Resource Strain      Within the past 12 months, have you or your family members you live with been unable to get utilities (heat, electricity) when it was really needed?: No   Food Insecurity: Low Risk  (12/19/2024)    Food Insecurity     Within the past 12 months, did you worry that your food would run out before you got money to buy more?: No     Within the past 12 months, did the food you bought just not last and you didn t have money to get more?: No   Transportation Needs: Low Risk  (12/19/2024)    Transportation Needs     Within the past 12 months, has lack of transportation kept you from medical appointments, getting your medicines, non-medical meetings or appointments, work, or from getting things that you need?: No   Physical Activity: Unknown (12/19/2024)    Exercise Vital Sign     Days of Exercise per Week: 0 days   Stress: Stress Concern Present (12/19/2024)    [place] [employer] - Occupational Stress Questionnaire     Feeling of Stress : Rather much   Social Connections: Unknown (12/19/2024)    Social Connection and Isolation Panel [NHANES]     Frequency of Social Gatherings with Friends and Family: Three times a week   Interpersonal Safety: Low Risk  (8/27/2024)    Interpersonal Safety     Do you feel physically and emotionally safe where you currently live?: Yes     Within the past 12 months, have you been hit, slapped, kicked or otherwise physically hurt by someone?: No     Within the past 12 months, have you been humiliated or emotionally abused in other ways by your partner or ex-partner?: No   Housing Stability: Low Risk  (12/19/2024)    Housing Stability     Do you have housing? : Yes     Are you worried about losing your housing?: No       Review of Systems:  Skin:        Eyes:       ENT:       Respiratory:  Negative    Cardiovascular:  dizziness    Gastroenterology:      Genitourinary:       Musculoskeletal:       Neurologic:  Negative    Psychiatric:       Heme/Lymph/Imm:       Endocrine:  "        Physical Exam:  Vitals: /75 (BP Location: Right arm, Patient Position: Sitting, Cuff Size: Adult Regular)   Pulse 80   Temp 98.9  F (37.2  C) (Temporal)   Resp 16   Ht 1.72 m (5' 7.72\")   Wt 76.4 kg (168 lb 8 oz)   LMP  (LMP Unknown)   SpO2 99%   BMI 25.83 kg/m      Constitutional:  cooperative, alert and oriented, well developed, well nourished, in no acute distress        Skin:  warm and dry to the touch, no apparent skin lesions or masses noted        Head:  normocephalic, no masses or lesions        Eyes:           ENT:  no pallor or cyanosis, dentition good        Neck:  carotid pulses are full and equal bilaterally, JVP normal, no carotid bruit        Chest:  normal breath sounds, clear to auscultation, normal A-P diameter, normal symmetry, normal respiratory excursion, no use of accessory muscles        Cardiac: regular rhythm, normal S1/S2, no S3 or S4, apical impulse not displaced, no murmurs, gallops or rubs                  Abdomen:  abdomen soft, BS normoactive        Vascular: pulses full and equal                                      Extremities and Muscular Skeletal:  no edema           Neurological:  no gross motor deficits        Psych:  affect appropriate, oriented to time, person and place     Recent Lab Results:  LIPID RESULTS:  Lab Results   Component Value Date    CHOL 188 04/25/2023    CHOL 214 (H) 12/24/2018    HDL 84 04/25/2023    HDL 94 12/24/2018    LDL 72 04/25/2023     (H) 12/24/2018    TRIG 160 (H) 04/25/2023    TRIG 74 12/24/2018    CHOLHDLRATIO 2.1 08/25/2014       LIVER ENZYME RESULTS:  Lab Results   Component Value Date    AST 15 01/30/2015    ALT 24 04/25/2023    ALT 17 01/30/2015       CBC RESULTS:  Lab Results   Component Value Date    WBC 4.8 12/19/2024    WBC 4.3 01/30/2015    RBC 4.49 12/19/2024    RBC 4.62 01/30/2015    HGB 13.9 12/19/2024    HGB 14.3 01/30/2015    HCT 40.4 12/19/2024    HCT 40.4 01/30/2015    MCV 90 12/19/2024    MCV 87 " "01/30/2015    MCH 31.0 12/19/2024    MCH 31.0 01/30/2015    MCHC 34.4 12/19/2024    MCHC 35.4 01/30/2015    RDW 14.1 12/19/2024    RDW 13.6 01/30/2015     12/19/2024     01/30/2015       BMP RESULTS:  Lab Results   Component Value Date     12/19/2024     04/07/2016    POTASSIUM 4.3 12/19/2024    POTASSIUM 3.8 04/07/2016    CHLORIDE 103 12/19/2024    CHLORIDE 106 04/07/2016    CO2 26 12/19/2024    CO2 29 04/07/2016    ANIONGAP 12 12/19/2024    ANIONGAP 9 04/07/2016    GLC 85 12/19/2024    GLC 88 12/24/2018    BUN 19.3 12/19/2024    BUN 19 04/07/2016    CR 0.89 12/19/2024    CR 0.78 04/07/2016    GFRESTIMATED 71 12/19/2024    GFRESTIMATED 75 04/07/2016    GFRESTBLACK >90   GFR Calc   04/07/2016    SONIA 9.4 12/19/2024    SONIA 9.7 04/07/2016        A1C RESULTS:  No results found for: \"A1C\"    INR RESULTS:  Lab Results   Component Value Date    INR 2.9 (H) 11/27/2024    INR 2.2 (H) 10/16/2024         Isaac Blair MD, [facility]    CC  Referred Self, MD  No address on file                  "

## 2025-01-21 DIAGNOSIS — E78.00 HYPERCHOLESTEROLEMIA: ICD-10-CM

## 2025-01-21 DIAGNOSIS — R60.0 LOWER EXTREMITY EDEMA: ICD-10-CM

## 2025-01-21 DIAGNOSIS — I49.5 SINUS NODE DYSFUNCTION (H): ICD-10-CM

## 2025-01-21 DIAGNOSIS — I25.10 CORONARY ARTERY DISEASE INVOLVING NATIVE CORONARY ARTERY OF NATIVE HEART WITHOUT ANGINA PECTORIS: ICD-10-CM

## 2025-01-21 DIAGNOSIS — Z95.0 CARDIAC PACEMAKER IN SITU: ICD-10-CM

## 2025-01-21 RX ORDER — FUROSEMIDE 20 MG/1
40 TABLET ORAL DAILY
Qty: 180 TABLET | Refills: 4 | Status: SHIPPED | OUTPATIENT
Start: 2025-01-21

## 2025-01-21 RX ORDER — ROSUVASTATIN CALCIUM 40 MG/1
40 TABLET, COATED ORAL DAILY
Qty: 90 TABLET | Refills: 4 | Status: SHIPPED | OUTPATIENT
Start: 2025-01-21

## 2025-01-29 DIAGNOSIS — E03.8 SUBCLINICAL HYPOTHYROIDISM: ICD-10-CM

## 2025-01-29 RX ORDER — LEVOTHYROXINE SODIUM 50 UG/1
TABLET ORAL
Qty: 30 TABLET | Refills: 2 | OUTPATIENT
Start: 2025-01-29

## 2025-02-03 ENCOUNTER — MYC MEDICAL ADVICE (OUTPATIENT)
Dept: ANTICOAGULATION | Facility: CLINIC | Age: 70
End: 2025-02-03
Payer: COMMERCIAL

## 2025-02-05 ENCOUNTER — LAB (OUTPATIENT)
Dept: LAB | Facility: CLINIC | Age: 70
End: 2025-02-05
Payer: COMMERCIAL

## 2025-02-05 ENCOUNTER — ANTICOAGULATION THERAPY VISIT (OUTPATIENT)
Dept: ANTICOAGULATION | Facility: CLINIC | Age: 70
End: 2025-02-05

## 2025-02-05 ENCOUNTER — MYC MEDICAL ADVICE (OUTPATIENT)
Dept: ANTICOAGULATION | Facility: CLINIC | Age: 70
End: 2025-02-05

## 2025-02-05 DIAGNOSIS — I48.0 PAROXYSMAL ATRIAL FIBRILLATION (H): ICD-10-CM

## 2025-02-05 DIAGNOSIS — I48.0 PAROXYSMAL ATRIAL FIBRILLATION (H): Primary | ICD-10-CM

## 2025-02-05 LAB — INR BLD: 1.6 (ref 0.9–1.1)

## 2025-02-05 PROCEDURE — 85610 PROTHROMBIN TIME: CPT

## 2025-02-05 PROCEDURE — 36416 COLLJ CAPILLARY BLOOD SPEC: CPT

## 2025-02-05 NOTE — PROGRESS NOTES
ANTICOAGULATION MANAGEMENT     Tatiana Skelton 69 year old female is on warfarin with subtherapeutic INR result. (Goal INR 2.0-3.0)    Recent labs: (last 7 days)     ASSESSMENT     Source(s): Chart Review  Previous INR was Subtherapeutic  Medication, diet, health changes since last INR chart reviewed; none identified         PLAN     Unable to reach Jaimie today.    Left message to take a booster dose of warfarin,  10 mg tonight. Request call back for assessment.    Follow up required to confirm warfarin dose taken and assess for changes and discuss out of range result     Paty Harris, RN  2/5/2025  Anticoagulation Clinic  [facility] for routing messages: jovon [facility]LAKE [facility] patient phone line: [phone]

## 2025-02-06 NOTE — TELEPHONE ENCOUNTER
Spoke with patient.    Paty Harris RN  [facility] Anticoagulation [facility]

## 2025-02-06 NOTE — PROGRESS NOTES
ANTICOAGULATION MANAGEMENT     Tatiana Skelton 69 year old female is on warfarin with subtherapeutic INR result. (Goal INR 2.0-3.0)    Recent labs: (last 7 days)     02/05/25  1254   INR 1.6*       ASSESSMENT     Source(s): Chart Review and Patient/Caregiver Call     Warfarin doses taken: Warfarin taken as instructed  Diet: No new diet changes identified  Medication/supplement changes: None noted  New illness, injury, or hospitalization: No  Signs or symptoms of bleeding or clotting: No  Previous result: Subtherapeutic  Additional findings: None       PLAN     Recommended plan for no diet, medication or health factor changes affecting INR     Dosing Instructions: booster dose then Increase your warfarin dose (10% change) with next INR in 2 weeks       Summary  As of 2/5/2025      Full warfarin instructions:  2/5: 7.5 mg; 2/6: 10 mg; Otherwise 10 mg every Mon, Wed, Fri; 7.5 mg all other days   Next INR check:  2/19/2025               Telephone call with Jaimie who verbalizes understanding and agrees to plan    Lab visit scheduled    Education provided: Goal range and lab monitoring: goal range and significance of current result  Contact [phone] with any changes, questions or concerns.     Plan made per [facility] anticoagulation protocol    Paty Harris, RN  2/6/2025  Anticoagulation Clinic  "[employer] pool for routing messages: jovon MEJIA  [facility] patient phone line: [phone]        _______________________________________________________________________     Anticoagulation Episode Summary       Current INR goal:  2.0-3.0   TTR:  83.5% (1 y)   Target end date:  Indefinite   Send INR reminders to:  LOU MEJIA    Indications    Paroxysmal atrial fibrillation (H) [I48.0]             Comments:  --             Anticoagulation Care Providers       Provider Role Specialty Phone number    Doris Fraser APRN CNP Referring Cardiovascular Disease [phone]    Nicole Gray, DANIE Referring Nurse  Practitioner - Family [phone]    Yadira Douglas MD Referring Urgent Care [phone]    Flor Ernst PA-C Referring Family Medicine [phone]

## 2025-02-06 NOTE — TELEPHONE ENCOUNTER
Patient Returning Call    Reason for call:  return call    Information relayed to patient:      Patient has additional questions:  Yes    What are your questions/concerns:  please call pt as soon as possible    Could we send this information to you in [facility] or would you prefer to receive a phone call?:   Patient would prefer a phone call   Okay to leave a detailed message?: Yes at Home number on file [phone] (home)

## 2025-02-19 ENCOUNTER — ANTICOAGULATION THERAPY VISIT (OUTPATIENT)
Dept: ANTICOAGULATION | Facility: CLINIC | Age: 70
End: 2025-02-19

## 2025-02-19 ENCOUNTER — LAB (OUTPATIENT)
Dept: LAB | Facility: CLINIC | Age: 70
End: 2025-02-19
Payer: COMMERCIAL

## 2025-02-19 DIAGNOSIS — E03.8 SUBCLINICAL HYPOTHYROIDISM: ICD-10-CM

## 2025-02-19 DIAGNOSIS — I48.0 PAROXYSMAL ATRIAL FIBRILLATION (H): ICD-10-CM

## 2025-02-19 DIAGNOSIS — I48.0 PAROXYSMAL ATRIAL FIBRILLATION (H): Primary | ICD-10-CM

## 2025-02-19 LAB
INR BLD: 3.5 (ref 0.9–1.1)
TSH SERPL DL<=0.005 MIU/L-ACNC: 1.22 UIU/ML (ref 0.3–4.2)

## 2025-02-19 PROCEDURE — 84443 ASSAY THYROID STIM HORMONE: CPT

## 2025-02-19 PROCEDURE — 85610 PROTHROMBIN TIME: CPT

## 2025-02-19 PROCEDURE — 36415 COLL VENOUS BLD VENIPUNCTURE: CPT

## 2025-02-19 NOTE — PROGRESS NOTES
ANTICOAGULATION MANAGEMENT     Tatiana Skelton 69 year old female is on warfarin with supratherapeutic INR result. (Goal INR 2.0-3.0)    Recent labs: (last 7 days)     02/19/25  1714   INR 3.5*       ASSESSMENT     Source(s): Chart Review and Patient/Caregiver Call     Warfarin doses taken: Warfarin taken as instructed  Diet: No new diet changes identified  Medication/supplement changes: None noted  New illness, injury, or hospitalization: No  Signs or symptoms of bleeding or clotting: No  Previous result: Subtherapeutic recent dose increase due to INR being sub therapeutic   Additional findings: None       PLAN     Recommended plan for no diet, medication or health factor changes affecting INR     Dosing Instructions: decrease your warfarin dose (4.2% change) with next INR in 2 weeks       Summary  As of 2/19/2025      Full warfarin instructions:  2/19: 10 mg; Otherwise 10 mg every Mon, Fri; 7.5 mg all other days   Next INR check:  3/5/2025               Telephone call with Jaimie who verbalizes understanding and agrees to plan    Patient offered & declined to schedule next visit    Education provided: Goal range and lab monitoring: goal range and significance of current result  Contact [phone] with any changes, questions or concerns.     Plan made per [facility] anticoagulation protocol    Paty Harris, RN  2/19/2025  Anticoagulation Clinic  [facility] for routing messages: jovon MEJIA  [facility] patient phone line: [phone]        _______________________________________________________________________     Anticoagulation Episode Summary       Current INR goal:  2.0-3.0   TTR:  81.7% (1 y)   Target end date:  Indefinite   Send INR reminders to:  LOU MEJIA    Indications    Paroxysmal atrial fibrillation (H) [I48.0]             Comments:  --             Anticoagulation Care Providers       Provider Role Specialty Phone number    Doris Fraser APRN CNP Referring Cardiovascular Disease  [phone]    Nicole Gray NP Referring Nurse Practitioner - Family [phone]    Yadira Douglas MD Referring Urgent Care [phone]    Flor Ernst PA-C Referring Family Medicine [phone]

## 2025-03-06 ENCOUNTER — MYC MEDICAL ADVICE (OUTPATIENT)
Dept: ANTICOAGULATION | Facility: CLINIC | Age: 70
End: 2025-03-06
Payer: COMMERCIAL

## 2025-03-12 ENCOUNTER — LAB (OUTPATIENT)
Dept: LAB | Facility: CLINIC | Age: 70
End: 2025-03-12
Payer: COMMERCIAL

## 2025-03-12 ENCOUNTER — ANTICOAGULATION THERAPY VISIT (OUTPATIENT)
Dept: ANTICOAGULATION | Facility: CLINIC | Age: 70
End: 2025-03-12

## 2025-03-12 DIAGNOSIS — I48.0 PAROXYSMAL ATRIAL FIBRILLATION (H): Primary | ICD-10-CM

## 2025-03-12 DIAGNOSIS — I48.0 PAROXYSMAL ATRIAL FIBRILLATION (H): ICD-10-CM

## 2025-03-12 LAB — INR BLD: 2 (ref 0.9–1.1)

## 2025-03-12 PROCEDURE — 36416 COLLJ CAPILLARY BLOOD SPEC: CPT

## 2025-03-12 PROCEDURE — 85610 PROTHROMBIN TIME: CPT

## 2025-03-12 NOTE — PROGRESS NOTES
ANTICOAGULATION MANAGEMENT     Tatiana Skelton 69 year old female is on warfarin with therapeutic INR result. (Goal INR 2.0-3.0)    Recent labs: (last 7 days)     03/12/25  0743   INR 2.0*       ASSESSMENT     Source(s): Chart Review and Patient/Caregiver Call     Warfarin doses taken: Warfarin taken as instructed  Diet: No new diet changes identified  Medication/supplement changes: None noted  New illness, injury, or hospitalization: No  Signs or symptoms of bleeding or clotting: No  Previous result: Supratherapeutic  Additional findings: None       PLAN     Recommended plan for no diet, medication or health factor changes affecting INR     Dosing Instructions: Continue your current warfarin dose with next INR in 4 weeks       Summary  As of 3/12/2025      Full warfarin instructions:  10 mg every Mon, Fri; 7.5 mg all other days   Next INR check:  4/9/2025               Telephone call with Jaimie who verbalizes understanding and agrees to plan    Patient offered & declined to schedule next visit    Education provided: Contact [phone] with any changes, questions or concerns.     Plan made per [facility] anticoagulation protocol    Jazmin HERNANDEZ RN  3/12/2025  Anticoagulation Clinic  [place] for routing messages: jovon MEJIA  [facility] patient phone line: [phone]        _______________________________________________________________________     Anticoagulation Episode Summary       Current INR goal:  2.0-3.0   TTR:  79.8% (1 y)   Target end date:  Indefinite   Send INR reminders to:  LOU MEJIA    Indications    Paroxysmal atrial fibrillation (H) [I48.0]             Comments:  --             Anticoagulation Care Providers       Provider Role Specialty Phone number    Doris Fraser APRN CNP Referring Cardiovascular Disease [phone]    Nicole Gray NP Referring Nurse Practitioner - Family [phone]    Yadira Douglas MD Referring Urgent Care [phone]    Flor Ernst  JESSICA [facility] [phone]

## 2025-03-26 NOTE — TELEPHONE ENCOUNTER
Jacobo Wade-    The mode switches are an indication that she is going in and out of atrial fibrillation.  Although there are 36 mode switches the time that she is in atrial fibrillation is less than 1% of the time.  Also, the good news is that her heart rates are under really good control while she is in atrial fibrillation.  Please reassure her that there is no concerning findings on this device check and as long as she is covered from an anticoagulation standpoint now on warfarin and her rates are well controlled there is nothing more that we would do.  She should continue to follow with our device clinic every 3 months, and I will see her back in December to discuss transitioning back to Xarelto.    HERMILA Armstrong, CNP     No

## 2025-04-07 DIAGNOSIS — I48.0 PAROXYSMAL ATRIAL FIBRILLATION (H): ICD-10-CM

## 2025-04-07 RX ORDER — WARFARIN SODIUM 5 MG/1
TABLET ORAL
Qty: 135 TABLET | Refills: 1 | Status: SHIPPED | OUTPATIENT
Start: 2025-04-07

## 2025-04-07 NOTE — TELEPHONE ENCOUNTER
ANTICOAGULATION MANAGEMENT:  Medication Refill    Anticoagulation Summary  As of 3/12/2025      Warfarin maintenance plan:  10 mg (5 mg x 2) every Mon, Fri; 7.5 mg (5 mg x 1.5) all other days   Next INR check:  4/9/2025   Target end date:  Indefinite    Indications    Paroxysmal atrial fibrillation (H) [I48.0]                 Anticoagulation Care Providers       Provider Role Specialty Phone number    EvelioDoris APRN CNP Referring Cardiovascular Disease [phone]    Nicole Gray NP Referring Nurse Practitioner - Family [phone]    Yadira Douglas MD Referring Urgent Care [phone]    Flor Ernst PA-C Referring Family Medicine [phone]            Refill Criteria    Visit with referring provider/group: Meets criteria: visit within referring provider group in the last 15 months on 12/19/2024    [facility] referral last signed: 12/02/2024; within last year:  Yes    Lab monitoring is up to date (not exceeding 2 weeks overdue): Yes    Tatiana meets all criteria for refill. Rx instructions and quantity supplied updated to match patient's current dosing plan. Warfarin 90 day supply with 1 refill granted per [facility] protocol     Mago Chu RN  Anticoagulation Clinic

## 2025-04-16 ENCOUNTER — MYC MEDICAL ADVICE (OUTPATIENT)
Dept: ANTICOAGULATION | Facility: CLINIC | Age: 70
End: 2025-04-16
Payer: COMMERCIAL

## 2025-04-24 DIAGNOSIS — E03.8 SUBCLINICAL HYPOTHYROIDISM: ICD-10-CM

## 2025-04-24 RX ORDER — LEVOTHYROXINE SODIUM 50 UG/1
50 TABLET ORAL
Qty: 90 TABLET | Refills: 0 | OUTPATIENT
Start: 2025-04-24

## 2025-04-26 DIAGNOSIS — E03.8 SUBCLINICAL HYPOTHYROIDISM: ICD-10-CM

## 2025-04-28 ENCOUNTER — ANTICOAGULATION THERAPY VISIT (OUTPATIENT)
Dept: ANTICOAGULATION | Facility: CLINIC | Age: 70
End: 2025-04-28

## 2025-04-28 ENCOUNTER — LAB (OUTPATIENT)
Dept: LAB | Facility: CLINIC | Age: 70
End: 2025-04-28
Payer: COMMERCIAL

## 2025-04-28 DIAGNOSIS — E03.8 SUBCLINICAL HYPOTHYROIDISM: ICD-10-CM

## 2025-04-28 DIAGNOSIS — I48.0 PAROXYSMAL ATRIAL FIBRILLATION (H): ICD-10-CM

## 2025-04-28 DIAGNOSIS — I48.0 PAROXYSMAL ATRIAL FIBRILLATION (H): Primary | ICD-10-CM

## 2025-04-28 LAB — INR BLD: 1.2 (ref 0.9–1.1)

## 2025-04-28 PROCEDURE — 85610 PROTHROMBIN TIME: CPT

## 2025-04-28 PROCEDURE — 36416 COLLJ CAPILLARY BLOOD SPEC: CPT

## 2025-04-28 RX ORDER — LEVOTHYROXINE SODIUM 50 UG/1
50 TABLET ORAL
Qty: 90 TABLET | Refills: 0 | OUTPATIENT
Start: 2025-04-28

## 2025-04-28 NOTE — PROGRESS NOTES
ANTICOAGULATION MANAGEMENT     Tatiana Skelton 69 year old female is on warfarin with subtherapeutic INR result. (Goal INR 2.0-3.0)    Recent labs: (last 7 days)     04/28/25  0948   INR 1.2*       ASSESSMENT     Source(s): Chart Review and Patient/Caregiver Call     Warfarin doses taken: Warfarin taken as instructed  Diet: No new diet changes identified  Medication/supplement changes: None noted  New illness, injury, or hospitalization: No  Signs or symptoms of bleeding or clotting: No  Previous result: Therapeutic last visit; previously outside of goal range  Additional findings: None       PLAN     Recommended plan for no diet, medication or health factor changes affecting INR     Dosing Instructions: booster dose then Increase your warfarin dose (13% change) with next INR in 9 days       Summary  As of 4/28/2025      Full warfarin instructions:  4/28: 15 mg; Otherwise 7.5 mg every Tue, Sat; 10 mg all other days   Next INR check:  5/5/2025               Telephone call with Jaimie who verbalizes understanding and agrees to plan    Patient elected to schedule next visit in 9 days due to her work schedule     Education provided: Goal range and lab monitoring: goal range and significance of current result  Contact [phone] with any changes, questions or concerns.     Plan made per [facility] anticoagulation protocol    Paty Harris RN  4/28/2025  Anticoagulation Clinic  [employer] for routing messages: jovon MEJIA  [facility] patient phone line: [phone]        _______________________________________________________________________     Anticoagulation Episode Summary       Current INR goal:  2.0-3.0   TTR:  67.0% (1 y)   Target end date:  Indefinite   Send INR reminders to:  LOU MEJIA    Indications    Paroxysmal atrial fibrillation (H) [I48.0]             Comments:  --             Anticoagulation Care Providers       Provider Role Specialty Phone number    Doris Fraser, HERMILA CNP Referring  Cardiovascular Disease [phone]    Nicole Gray NP Referring Nurse Practitioner - Family [phone]    Yadira Douglas MD Referring Urgent Care [phone]    Flor Ernst PA-C Referring Family Medicine [phone]

## 2025-04-30 DIAGNOSIS — E03.8 SUBCLINICAL HYPOTHYROIDISM: ICD-10-CM

## 2025-04-30 RX ORDER — LEVOTHYROXINE SODIUM 50 UG/1
50 TABLET ORAL
Qty: 90 TABLET | Refills: 0 | OUTPATIENT
Start: 2025-04-30

## 2025-05-07 ENCOUNTER — LAB (OUTPATIENT)
Dept: LAB | Facility: CLINIC | Age: 70
End: 2025-05-07
Payer: COMMERCIAL

## 2025-05-07 ENCOUNTER — ANTICOAGULATION THERAPY VISIT (OUTPATIENT)
Dept: ANTICOAGULATION | Facility: CLINIC | Age: 70
End: 2025-05-07

## 2025-05-07 DIAGNOSIS — E03.8 SUBCLINICAL HYPOTHYROIDISM: ICD-10-CM

## 2025-05-07 DIAGNOSIS — I48.0 PAROXYSMAL ATRIAL FIBRILLATION (H): ICD-10-CM

## 2025-05-07 DIAGNOSIS — I48.0 PAROXYSMAL ATRIAL FIBRILLATION (H): Primary | ICD-10-CM

## 2025-05-07 LAB — INR BLD: 2 (ref 0.9–1.1)

## 2025-05-07 PROCEDURE — 36416 COLLJ CAPILLARY BLOOD SPEC: CPT

## 2025-05-07 PROCEDURE — 85610 PROTHROMBIN TIME: CPT

## 2025-05-07 NOTE — PROGRESS NOTES
ANTICOAGULATION MANAGEMENT     Tatiana Skelton 69 year old female is on warfarin with therapeutic INR result. (Goal INR 2.0-3.0)    Recent labs: (last 7 days)     05/07/25  1628   INR 2.0*       ASSESSMENT     Source(s): Chart Review  Previous INR was Subtherapeutic  Medication, diet, health changes since last INR chart reviewed; none identified         PLAN     Recommended plan for no diet, medication or health factor changes affecting INR     Dosing Instructions: Continue your current warfarin dose with next INR in 2 weeks       Summary  As of 5/7/2025      Full warfarin instructions:  7.5 mg every Tue, Sat; 10 mg all other days   Next INR check:  5/21/2025               Sent [employer] message with dosing and follow up instructions    Contact [phone] to schedule and with any changes, questions or concerns.     Education provided: Please call back if any changes to your diet, medications or how you've been taking warfarin    Plan made per [facility] anticoagulation protocol    Jazmin HERNANDEZ RN  5/7/2025  Anticoagulation Clinic  [employer] for routing messages: jovon MEJIA  [facility] patient phone line: [phone]        _______________________________________________________________________     Anticoagulation Episode Summary       Current INR goal:  2.0-3.0   TTR:  64.5% (1 y)   Target end date:  Indefinite   Send INR reminders to:  LOU MEJIA    Indications    Paroxysmal atrial fibrillation (H) [I48.0]             Comments:  --             Anticoagulation Care Providers       Provider Role Specialty Phone number    Doris Fraser APRN CNP Referring Cardiovascular Disease [phone]    Nicole Gray NP Referring Nurse Practitioner - Family [phone]    Yadira Douglas MD Referring Urgent Care [phone]    Flor Ernst PA-C Referring Family Medicine [phone]

## 2025-05-08 RX ORDER — LEVOTHYROXINE SODIUM 50 UG/1
50 TABLET ORAL
Qty: 90 TABLET | Refills: 1 | Status: SHIPPED | OUTPATIENT
Start: 2025-05-08

## 2025-05-08 NOTE — PROGRESS NOTES
ANTICOAGULATION MANAGEMENT     Tatiana Skelton 69 year old female is on warfarin with therapeutic INR result. (Goal INR 2.0-3.0)    Recent labs: (last 7 days)     05/07/25  1628   INR 2.0*       ASSESSMENT     Source(s): Chart Review and Patient/Caregiver Call     Warfarin doses taken: Warfarin taken as instructed  Diet: No new diet changes identified  Medication/supplement changes: None noted  New illness, injury, or hospitalization: No  Signs or symptoms of bleeding or clotting: No  Previous result: Subtherapeutic  Additional findings: None       PLAN     Recommended plan for no diet, medication or health factor changes affecting INR     Dosing Instructions: Continue your current warfarin dose with next INR in 3 weeks       Summary  As of 5/7/2025      Full warfarin instructions:  7.5 mg every Tue, Sat; 10 mg all other days   Next INR check:  5/28/2025               Telephone call with Jaimie who verbalizes understanding and agrees to plan    Lab visit scheduled    Education provided: Contact [phone] with any changes, questions or concerns.     Plan made per [facility] anticoagulation protocol    Jazmin HERNANDEZ RN  5/8/2025  Anticoagulation Clinic  [employer] for routing messages: jovon MEJIA  [facility] patient phone line: [phone]        _______________________________________________________________________     Anticoagulation Episode Summary       Current INR goal:  2.0-3.0   TTR:  64.4% (1 y)   Target end date:  Indefinite   Send INR reminders to:  LOU MEJIA    Indications    Paroxysmal atrial fibrillation (H) [I48.0]             Comments:  --             Anticoagulation Care Providers       Provider Role Specialty Phone number    Doris Fraser APRN CNP Referring Cardiovascular Disease [phone]    Nicole Gray NP Referring Nurse Practitioner - Family [phone]    Yadira Douglas MD Referring Urgent Care [phone]    Flor Ernst PA-C Referring Family Medicine  [phone]

## 2025-05-27 ENCOUNTER — TELEPHONE (OUTPATIENT)
Dept: INTERNAL MEDICINE | Facility: CLINIC | Age: 70
End: 2025-05-27
Payer: COMMERCIAL

## 2025-05-27 NOTE — TELEPHONE ENCOUNTER
Unable to reach patient mail box is full, I did schedule her 6/4/25 4:15  Guillermina Ocampo Rn  [facility]

## 2025-05-27 NOTE — TELEPHONE ENCOUNTER
FYI - Status Update    Who is Calling: patient    Update: patient can't get out of work for next INR wanted to schedule for 6/4/25 at 4:15PM, I can only schedule till 3:15PM    Does caller want a call/response back: Yes     Could we send this information to you in [employer] or would you prefer to receive a phone call?:   Patient would prefer a phone call   Okay to leave a detailed message?: Yes at Cell number on file:    Telephone Information:   Mobile [phone]

## 2025-06-04 ENCOUNTER — LAB (OUTPATIENT)
Dept: LAB | Facility: CLINIC | Age: 70
End: 2025-06-04
Payer: COMMERCIAL

## 2025-06-04 ENCOUNTER — RESULTS FOLLOW-UP (OUTPATIENT)
Dept: NURSING | Facility: CLINIC | Age: 70
End: 2025-06-04

## 2025-06-04 ENCOUNTER — ANTICOAGULATION THERAPY VISIT (OUTPATIENT)
Dept: ANTICOAGULATION | Facility: CLINIC | Age: 70
End: 2025-06-04

## 2025-06-04 DIAGNOSIS — I48.0 PAROXYSMAL ATRIAL FIBRILLATION (H): ICD-10-CM

## 2025-06-04 DIAGNOSIS — I48.0 PAROXYSMAL ATRIAL FIBRILLATION (H): Primary | ICD-10-CM

## 2025-06-04 LAB — INR BLD: 1.3 (ref 0.9–1.1)

## 2025-06-04 PROCEDURE — 36416 COLLJ CAPILLARY BLOOD SPEC: CPT

## 2025-06-04 PROCEDURE — 85610 PROTHROMBIN TIME: CPT

## 2025-06-04 NOTE — PROGRESS NOTES
ANTICOAGULATION MANAGEMENT     Tatiana Skelton 69 year old female is on warfarin with subtherapeutic INR result. (Goal INR 2.0-3.0)    Recent labs: (last 7 days)     06/04/25  1712   INR 1.3*       ASSESSMENT     Source(s): Chart Review and Patient/Caregiver Call     Warfarin doses taken: Warfarin taken as instructed  Diet: No new diet changes identified  Medication/supplement changes: None noted  New illness, injury, or hospitalization: No  Signs or symptoms of bleeding or clotting: No  Previous result: Therapeutic last visit; previously outside of goal range  Additional findings: None       PLAN     Recommended plan for temporary change(s) affecting INR     Dosing Instructions: booster dose then continue your current warfarin dose with next INR in 1 week       Summary  As of 6/4/2025      Full warfarin instructions:  6/4: 15 mg; Otherwise 7.5 mg every Tue, Sat; 10 mg all other days   Next INR check:  6/11/2025               Telephone call with Jaimie who verbalizes understanding and agrees to plan    Lab visit scheduled    Education provided: Goal range and lab monitoring: goal range and significance of current result    Plan made per [facility] anticoagulation protocol    Diane Narvaez RN  6/4/2025  Anticoagulation Clinic  [employer] for routing messages: p [facility] phone [facility]: [phone]        _______________________________________________________________________     Anticoagulation Episode Summary       Current INR goal:  2.0-3.0   TTR:  56.8% (1 y)   Target end date:  Indefinite   [facility] [place]    Indications    Paroxysmal atrial fibrillation (H) [I48.0]             Comments:  --             Anticoagulation Care Providers       Provider Role Specialty Phone number    Doris Fraser APRN CNP Referring Cardiovascular Disease [phone]    Nicole Gray NP Referring Nurse Practitioner - Family [phone]    Yadira Douglas MD Referring  Urgent Care [phone]    Flor Ernst PA-C Referring Family Medicine [phone]

## 2025-06-11 ENCOUNTER — RESULTS FOLLOW-UP (OUTPATIENT)
Dept: ANTICOAGULATION | Facility: CLINIC | Age: 70
End: 2025-06-11

## 2025-06-11 ENCOUNTER — LAB (OUTPATIENT)
Dept: LAB | Facility: CLINIC | Age: 70
End: 2025-06-11
Payer: COMMERCIAL

## 2025-06-11 ENCOUNTER — ANTICOAGULATION THERAPY VISIT (OUTPATIENT)
Dept: ANTICOAGULATION | Facility: CLINIC | Age: 70
End: 2025-06-11

## 2025-06-11 DIAGNOSIS — I48.0 PAROXYSMAL ATRIAL FIBRILLATION (H): ICD-10-CM

## 2025-06-11 DIAGNOSIS — I48.0 PAROXYSMAL ATRIAL FIBRILLATION (H): Primary | ICD-10-CM

## 2025-06-11 LAB — INR BLD: 2.5 (ref 0.9–1.1)

## 2025-06-11 PROCEDURE — 36416 COLLJ CAPILLARY BLOOD SPEC: CPT

## 2025-06-11 PROCEDURE — 85610 PROTHROMBIN TIME: CPT

## 2025-06-11 NOTE — PROGRESS NOTES
ANTICOAGULATION MANAGEMENT     Tatiana Skelton 69 year old female is on warfarin with therapeutic INR result. (Goal INR 2.0-3.0)    Recent labs: (last 7 days)     06/11/25  1648   INR 2.5*       ASSESSMENT     Source(s): Chart Review and Patient/Caregiver Call     Warfarin doses taken: Warfarin taken as instructed  Diet: No new diet changes identified  Medication/supplement changes: None noted  New illness, injury, or hospitalization: No  Signs or symptoms of bleeding or clotting: No  Previous result: Subtherapeutic  Additional findings: None       PLAN     Recommended plan for no diet, medication or health factor changes affecting INR     Dosing Instructions: Continue your current warfarin dose with next INR in 2 weeks       Summary  As of 6/11/2025      Full warfarin instructions:  7.5 mg every Tue, Sat; 10 mg all other days   Next INR check:  6/25/2025               Telephone call with Jaimie who verbalizes understanding and agrees to plan    Lab visit scheduled    Education provided: Contact [phone] with any changes, questions or concerns.     Plan made per [facility] anticoagulation protocol    Paty Harris RN  6/11/2025  Anticoagulation Clinic  [employer] for routing messages: jovon MEJIA  [facility] patient phone line: [phone]        _______________________________________________________________________     Anticoagulation Episode Summary       Current INR goal:  2.0-3.0   TTR:  55.7% (1 y)   Target end date:  Indefinite   Send INR reminders to:  LOU MEJIA    Indications    Paroxysmal atrial fibrillation (H) [I48.0]             Comments:  --             Anticoagulation Care Providers       Provider Role Specialty Phone number    Doris Fraser APRN CNP Referring Cardiovascular Disease [phone]    Nicole Gray NP Referring Nurse Practitioner - Family [phone]    Yadira Douglas MD Referring Urgent Care [phone]    Flor Ernst PA-C Referring Family  Medicine [phone]

## 2025-06-14 ENCOUNTER — HEALTH MAINTENANCE LETTER (OUTPATIENT)
Age: 70
End: 2025-06-14

## 2025-07-09 ENCOUNTER — RESULTS FOLLOW-UP (OUTPATIENT)
Dept: ANTICOAGULATION | Facility: CLINIC | Age: 70
End: 2025-07-09

## 2025-07-09 ENCOUNTER — ANTICOAGULATION THERAPY VISIT (OUTPATIENT)
Dept: ANTICOAGULATION | Facility: CLINIC | Age: 70
End: 2025-07-09

## 2025-07-09 ENCOUNTER — LAB (OUTPATIENT)
Dept: LAB | Facility: CLINIC | Age: 70
End: 2025-07-09
Payer: COMMERCIAL

## 2025-07-09 DIAGNOSIS — I48.0 PAROXYSMAL ATRIAL FIBRILLATION (H): Primary | ICD-10-CM

## 2025-07-09 DIAGNOSIS — I48.0 PAROXYSMAL ATRIAL FIBRILLATION (H): ICD-10-CM

## 2025-07-09 LAB — INR BLD: 3 (ref 0.9–1.1)

## 2025-07-09 PROCEDURE — 36416 COLLJ CAPILLARY BLOOD SPEC: CPT

## 2025-07-09 PROCEDURE — 85610 PROTHROMBIN TIME: CPT

## 2025-07-09 RX ORDER — WARFARIN SODIUM 5 MG/1
TABLET ORAL
Qty: 160 TABLET | Refills: 1 | Status: SHIPPED | OUTPATIENT
Start: 2025-07-09

## 2025-07-09 NOTE — PROGRESS NOTES
ANTICOAGULATION MANAGEMENT     Tatiana Skelton 70 year old female is on warfarin with therapeutic INR result. (Goal INR 2.0-3.0)    Recent labs: (last 7 days)     07/09/25  0748   INR 3.0*       ASSESSMENT     Source(s): Chart Review and Patient/Caregiver Call     Warfarin doses taken: Warfarin taken as instructed  Diet: No new diet changes identified  Medication/supplement changes: None noted  New illness, injury, or hospitalization: No  Signs or symptoms of bleeding or clotting: No  Previous result: Therapeutic last visit; previously outside of goal range  Additional findings: Refill needed today. Tatiana meets all criteria for refill (current [facility] referral, visit with referring provider/group in last 15 months unless directed to return in 2 years in last referring provider visit note, lab monitoring up to date or not exceeding 2 weeks overdue). Rx instructions and quantity supplied updated to match patient's current dosing plan. Warfarin 90 day supply with 1 refill granted per [facility] protocol        PLAN     Recommended plan for no diet, medication or health factor changes affecting INR     Dosing Instructions: Continue your current warfarin dose with next INR in 4 weeks       Summary  As of 7/9/2025      Full warfarin instructions:  7.5 mg every Tue, Sat; 10 mg all other days   Next INR check:  8/6/2025               Telephone call with Jaimie who verbalizes understanding and agrees to plan and who agrees to plan and repeated back plan correctly    Lab visit scheduled    Education provided: None required    Plan made per [facility] anticoagulation protocol    Jacque Koehler RN  7/9/2025  Anticoagulation Clinic  [employer] for routing messages: jovon MEJIA  [facility] patient phone line: [phone]        _______________________________________________________________________     Anticoagulation Episode Summary       Current INR goal:  2.0-3.0   TTR:  55.6% (1 y)   Target end date:  Indefinite   Send INR reminders to:   [facility] [place]    Indications    Paroxysmal atrial fibrillation (H) [I48.0]             Comments:  --             Anticoagulation Care Providers       Provider Role Specialty Phone number    Doris Fraser APRN CNP Referring Cardiovascular Disease [phone]    Nicole Gray NP Referring Nurse Practitioner - Family [phone]    Yadira Douglas MD Referring Urgent Care [phone]    Flor Ernst PA-C Referring Family Medicine [phone]

## 2025-08-04 ENCOUNTER — LAB (OUTPATIENT)
Dept: LAB | Facility: CLINIC | Age: 70
End: 2025-08-04
Payer: COMMERCIAL

## 2025-08-04 ENCOUNTER — ANTICOAGULATION THERAPY VISIT (OUTPATIENT)
Dept: ANTICOAGULATION | Facility: CLINIC | Age: 70
End: 2025-08-04

## 2025-08-04 DIAGNOSIS — I48.0 PAROXYSMAL ATRIAL FIBRILLATION (H): Primary | ICD-10-CM

## 2025-08-04 DIAGNOSIS — I48.0 PAROXYSMAL ATRIAL FIBRILLATION (H): ICD-10-CM

## 2025-08-04 LAB — INR BLD: 3.3 (ref 0.9–1.1)

## 2025-08-04 PROCEDURE — 85610 PROTHROMBIN TIME: CPT

## 2025-08-04 PROCEDURE — 36416 COLLJ CAPILLARY BLOOD SPEC: CPT

## 2025-08-19 ENCOUNTER — NURSE TRIAGE (OUTPATIENT)
Dept: INTERNAL MEDICINE | Facility: CLINIC | Age: 70
End: 2025-08-19

## 2025-08-19 ENCOUNTER — HOSPITAL ENCOUNTER (EMERGENCY)
Facility: CLINIC | Age: 70
Discharge: HOME | End: 2025-08-19
Attending: STUDENT IN AN ORGANIZED HEALTH CARE EDUCATION/TRAINING PROGRAM | Admitting: STUDENT IN AN ORGANIZED HEALTH CARE EDUCATION/TRAINING PROGRAM
Payer: COMMERCIAL

## 2025-08-19 ENCOUNTER — APPOINTMENT (OUTPATIENT)
Dept: CT IMAGING | Facility: CLINIC | Age: 70
End: 2025-08-19
Attending: STUDENT IN AN ORGANIZED HEALTH CARE EDUCATION/TRAINING PROGRAM
Payer: COMMERCIAL

## 2025-08-19 ENCOUNTER — LAB (OUTPATIENT)
Dept: LAB | Facility: CLINIC | Age: 70
End: 2025-08-19
Payer: COMMERCIAL

## 2025-08-19 ENCOUNTER — ANTICOAGULATION THERAPY VISIT (OUTPATIENT)
Dept: ANTICOAGULATION | Facility: CLINIC | Age: 70
End: 2025-08-19

## 2025-08-19 VITALS
TEMPERATURE: 98.1 F | RESPIRATION RATE: 20 BRPM | OXYGEN SATURATION: 97 % | HEART RATE: 61 BPM | BODY MASS INDEX: 22.19 KG/M2 | HEIGHT: 70 IN | SYSTOLIC BLOOD PRESSURE: 141 MMHG | WEIGHT: 155 LBS | DIASTOLIC BLOOD PRESSURE: 93 MMHG

## 2025-08-19 DIAGNOSIS — L03.113 CELLULITIS OF RIGHT UPPER EXTREMITY: Primary | ICD-10-CM

## 2025-08-19 DIAGNOSIS — I48.0 PAROXYSMAL ATRIAL FIBRILLATION (H): ICD-10-CM

## 2025-08-19 DIAGNOSIS — I48.0 PAROXYSMAL ATRIAL FIBRILLATION (H): Primary | ICD-10-CM

## 2025-08-19 LAB
ANION GAP SERPL CALCULATED.3IONS-SCNC: 10 MMOL/L (ref 7–15)
BASOPHILS # BLD AUTO: 0.03 10E3/UL (ref 0–0.2)
BASOPHILS NFR BLD AUTO: 0.6 %
BUN SERPL-MCNC: 15.4 MG/DL (ref 8–23)
CALCIUM SERPL-MCNC: 9.1 MG/DL (ref 8.8–10.4)
CHLORIDE SERPL-SCNC: 107 MMOL/L (ref 98–107)
CREAT SERPL-MCNC: 0.77 MG/DL (ref 0.51–0.95)
CRP SERPL-MCNC: <3 MG/L
EGFRCR SERPLBLD CKD-EPI 2021: 84 ML/MIN/1.73M2
EOSINOPHIL # BLD AUTO: 0.15 10E3/UL (ref 0–0.7)
EOSINOPHIL NFR BLD AUTO: 3.2 %
ERYTHROCYTE [DISTWIDTH] IN BLOOD BY AUTOMATED COUNT: 14.4 % (ref 10–15)
GLUCOSE SERPL-MCNC: 87 MG/DL (ref 70–99)
HCO3 SERPL-SCNC: 26 MMOL/L (ref 22–29)
HCT VFR BLD AUTO: 38.3 % (ref 35–47)
HGB BLD-MCNC: 13.1 G/DL (ref 11.7–15.7)
IMM GRANULOCYTES # BLD: <0.03 10E3/UL
IMM GRANULOCYTES NFR BLD: 0.2 %
INR BLD: 2.2 (ref 0.9–1.1)
LACTATE SERPL-SCNC: 0.7 MMOL/L (ref 0.7–2)
LYMPHOCYTES # BLD AUTO: 1.22 10E3/UL (ref 0.8–5.3)
LYMPHOCYTES NFR BLD AUTO: 26.3 %
MCH RBC QN AUTO: 30.9 PG (ref 26.5–33)
MCHC RBC AUTO-ENTMCNC: 34.2 G/DL (ref 31.5–36.5)
MCV RBC AUTO: 90.3 FL (ref 78–100)
MONOCYTES # BLD AUTO: 0.41 10E3/UL (ref 0–1.3)
MONOCYTES NFR BLD AUTO: 8.9 %
NEUTROPHILS # BLD AUTO: 2.81 10E3/UL (ref 1.6–8.3)
NEUTROPHILS NFR BLD AUTO: 60.8 %
NRBC # BLD AUTO: <0.03 10E3/UL
NRBC BLD AUTO-RTO: 0 /100
PLATELET # BLD AUTO: 162 10E3/UL (ref 150–450)
POTASSIUM SERPL-SCNC: 3.6 MMOL/L (ref 3.4–5.3)
RBC # BLD AUTO: 4.24 10E6/UL (ref 3.8–5.2)
SODIUM SERPL-SCNC: 143 MMOL/L (ref 135–145)
WBC # BLD AUTO: 4.63 10E3/UL (ref 4–11)

## 2025-08-19 PROCEDURE — 87040 BLOOD CULTURE FOR BACTERIA: CPT | Performed by: STUDENT IN AN ORGANIZED HEALTH CARE EDUCATION/TRAINING PROGRAM

## 2025-08-19 PROCEDURE — 87468 ANAPLSMA PHGCYTOPHLM AMP PRB: CPT | Performed by: STUDENT IN AN ORGANIZED HEALTH CARE EDUCATION/TRAINING PROGRAM

## 2025-08-19 PROCEDURE — 83605 ASSAY OF LACTIC ACID: CPT | Performed by: STUDENT IN AN ORGANIZED HEALTH CARE EDUCATION/TRAINING PROGRAM

## 2025-08-19 PROCEDURE — 85004 AUTOMATED DIFF WBC COUNT: CPT | Performed by: STUDENT IN AN ORGANIZED HEALTH CARE EDUCATION/TRAINING PROGRAM

## 2025-08-19 PROCEDURE — 86618 LYME DISEASE ANTIBODY: CPT | Performed by: STUDENT IN AN ORGANIZED HEALTH CARE EDUCATION/TRAINING PROGRAM

## 2025-08-19 PROCEDURE — 99285 EMERGENCY DEPT VISIT HI MDM: CPT | Mod: 25 | Performed by: STUDENT IN AN ORGANIZED HEALTH CARE EDUCATION/TRAINING PROGRAM

## 2025-08-19 PROCEDURE — 36415 COLL VENOUS BLD VENIPUNCTURE: CPT | Performed by: STUDENT IN AN ORGANIZED HEALTH CARE EDUCATION/TRAINING PROGRAM

## 2025-08-19 PROCEDURE — 99284 EMERGENCY DEPT VISIT MOD MDM: CPT | Performed by: STUDENT IN AN ORGANIZED HEALTH CARE EDUCATION/TRAINING PROGRAM

## 2025-08-19 PROCEDURE — 36416 COLLJ CAPILLARY BLOOD SPEC: CPT

## 2025-08-19 PROCEDURE — 73200 CT UPPER EXTREMITY W/O DYE: CPT | Mod: RT

## 2025-08-19 PROCEDURE — 250N000013 HC RX MED GY IP 250 OP 250 PS 637: Performed by: STUDENT IN AN ORGANIZED HEALTH CARE EDUCATION/TRAINING PROGRAM

## 2025-08-19 PROCEDURE — 250N000011 HC RX IP 250 OP 636: Performed by: STUDENT IN AN ORGANIZED HEALTH CARE EDUCATION/TRAINING PROGRAM

## 2025-08-19 PROCEDURE — 85610 PROTHROMBIN TIME: CPT

## 2025-08-19 PROCEDURE — 96365 THER/PROPH/DIAG IV INF INIT: CPT | Performed by: STUDENT IN AN ORGANIZED HEALTH CARE EDUCATION/TRAINING PROGRAM

## 2025-08-19 PROCEDURE — 80048 BASIC METABOLIC PNL TOTAL CA: CPT | Performed by: STUDENT IN AN ORGANIZED HEALTH CARE EDUCATION/TRAINING PROGRAM

## 2025-08-19 PROCEDURE — 86140 C-REACTIVE PROTEIN: CPT | Performed by: STUDENT IN AN ORGANIZED HEALTH CARE EDUCATION/TRAINING PROGRAM

## 2025-08-19 RX ORDER — CLINDAMYCIN HYDROCHLORIDE 300 MG/1
300 CAPSULE ORAL 4 TIMES DAILY
Qty: 40 CAPSULE | Refills: 0 | Status: ON HOLD | OUTPATIENT
Start: 2025-08-19 | End: 2025-08-21

## 2025-08-19 RX ORDER — CLINDAMYCIN HYDROCHLORIDE 150 MG/1
300 CAPSULE ORAL ONCE
Status: COMPLETED | OUTPATIENT
Start: 2025-08-19 | End: 2025-08-19

## 2025-08-19 RX ORDER — CLINDAMYCIN HYDROCHLORIDE 300 MG/1
300 CAPSULE ORAL 4 TIMES DAILY
Qty: 40 CAPSULE | Refills: 0 | Status: SHIPPED | OUTPATIENT
Start: 2025-08-19 | End: 2025-08-19

## 2025-08-19 RX ORDER — CEFEPIME HYDROCHLORIDE 2 G/1
2 INJECTION, POWDER, FOR SOLUTION INTRAVENOUS ONCE
Status: COMPLETED | OUTPATIENT
Start: 2025-08-19 | End: 2025-08-19

## 2025-08-19 RX ORDER — CEPHALEXIN 500 MG/1
500 CAPSULE ORAL 4 TIMES DAILY
Qty: 12 CAPSULE | Refills: 0 | Status: SHIPPED | OUTPATIENT
Start: 2025-08-19 | End: 2025-08-19

## 2025-08-19 RX ORDER — CEPHALEXIN 500 MG/1
500 CAPSULE ORAL 4 TIMES DAILY
Qty: 12 CAPSULE | Refills: 0 | Status: ON HOLD | OUTPATIENT
Start: 2025-08-19 | End: 2025-08-21

## 2025-08-19 RX ORDER — DIPHENHYDRAMINE HCL 25 MG
50 CAPSULE ORAL ONCE
Status: COMPLETED | OUTPATIENT
Start: 2025-08-19 | End: 2025-08-19

## 2025-08-19 RX ORDER — IOPAMIDOL 755 MG/ML
500 INJECTION, SOLUTION INTRAVASCULAR ONCE
Status: COMPLETED | OUTPATIENT
Start: 2025-08-19 | End: 2025-08-19

## 2025-08-19 RX ADMIN — CLINDAMYCIN HYDROCHLORIDE 300 MG: 150 CAPSULE ORAL at 22:44

## 2025-08-19 RX ADMIN — CEFEPIME 2 G: 2 INJECTION, POWDER, FOR SOLUTION INTRAVENOUS at 20:48

## 2025-08-19 RX ADMIN — DIPHENHYDRAMINE HYDROCHLORIDE 50 MG: 25 CAPSULE ORAL at 23:12

## 2025-08-19 ASSESSMENT — ACTIVITIES OF DAILY LIVING (ADL)
ADLS_ACUITY_SCORE: 41

## 2025-08-19 ASSESSMENT — COLUMBIA-SUICIDE SEVERITY RATING SCALE - C-SSRS
2. HAVE YOU ACTUALLY HAD ANY THOUGHTS OF KILLING YOURSELF IN THE PAST MONTH?: NO
1. IN THE PAST MONTH, HAVE YOU WISHED YOU WERE DEAD OR WISHED YOU COULD GO TO SLEEP AND NOT WAKE UP?: NO
6. HAVE YOU EVER DONE ANYTHING, STARTED TO DO ANYTHING, OR PREPARED TO DO ANYTHING TO END YOUR LIFE?: NO

## 2025-08-20 ENCOUNTER — PATIENT OUTREACH (OUTPATIENT)
Dept: CARE COORDINATION | Facility: CLINIC | Age: 70
End: 2025-08-20

## 2025-08-20 ENCOUNTER — HOSPITAL ENCOUNTER (OUTPATIENT)
Facility: CLINIC | Age: 70
Setting detail: OBSERVATION
Discharge: HOME | End: 2025-08-21
Attending: NURSE PRACTITIONER | Admitting: INTERNAL MEDICINE
Payer: COMMERCIAL

## 2025-08-20 ENCOUNTER — TELEPHONE (OUTPATIENT)
Dept: INTERNAL MEDICINE | Facility: CLINIC | Age: 70
End: 2025-08-20

## 2025-08-20 ENCOUNTER — MYC MEDICAL ADVICE (OUTPATIENT)
Dept: INTERNAL MEDICINE | Facility: CLINIC | Age: 70
End: 2025-08-20

## 2025-08-20 PROBLEM — L03.113 CELLULITIS OF RIGHT HAND: Status: ACTIVE | Noted: 2025-08-20

## 2025-08-20 LAB
A PHAGOCYTOPH DNA BLD QL NAA+PROBE: NOT DETECTED
B BURGDOR IGG+IGM SER QL: 0.11
BABESIA DNA BLD QL NAA+PROBE: NOT DETECTED
EHRLICHIA DNA SPEC QL NAA+PROBE: NOT DETECTED

## 2025-08-20 ASSESSMENT — ACTIVITIES OF DAILY LIVING (ADL)
DESCRIBE_HEARING_LOSS: BILATERAL HEARING LOSS;RINGING IN EARS
DRESSING/BATHING_DIFFICULTY: NO
DOING_ERRANDS_INDEPENDENTLY_DIFFICULTY: NO
DIFFICULTY_COMMUNICATING: NO
HEARING_DIFFICULTY_OR_DEAF: YES
TOILETING_ISSUES: NO
ADLS_ACUITY_SCORE: 41
ADLS_ACUITY_SCORE: 41
ADLS_ACUITY_SCORE: 22
ADLS_ACUITY_SCORE: 41
THE_FOLLOWING_AIDS_WERE_PROVIDED;: PATIENT DECLINED OFFER OF AUXILIARY AIDS
ADLS_ACUITY_SCORE: 22
WERE_AUXILIARY_AIDS_OFFERED?: YES
ADLS_ACUITY_SCORE: 22
CHANGE_IN_FUNCTIONAL_STATUS_SINCE_ONSET_OF_CURRENT_ILLNESS/INJURY: NO
FALL_HISTORY_WITHIN_LAST_SIX_MONTHS: NO
PATIENT'S_PREFERRED_MEANS_OF_COMMUNICATION: ENGLISH SPEAKER WITH HEARING LOSS, NO SPEECH PROBLEMS.
WALKING_OR_CLIMBING_STAIRS_DIFFICULTY: NO
ADLS_ACUITY_SCORE: 22
ADLS_ACUITY_SCORE: 41
CONCENTRATING,_REMEMBERING_OR_MAKING_DECISIONS_DIFFICULTY: NO
DIFFICULTY_EATING/SWALLOWING: NO
WEAR_GLASSES_OR_BLIND: NO
ADLS_ACUITY_SCORE: 41
ADLS_ACUITY_SCORE: 22

## 2025-08-20 ASSESSMENT — COLUMBIA-SUICIDE SEVERITY RATING SCALE - C-SSRS
2. HAVE YOU ACTUALLY HAD ANY THOUGHTS OF KILLING YOURSELF IN THE PAST MONTH?: NO
6. HAVE YOU EVER DONE ANYTHING, STARTED TO DO ANYTHING, OR PREPARED TO DO ANYTHING TO END YOUR LIFE?: NO
1. IN THE PAST MONTH, HAVE YOU WISHED YOU WERE DEAD OR WISHED YOU COULD GO TO SLEEP AND NOT WAKE UP?: NO

## 2025-08-21 ENCOUNTER — TELEPHONE (OUTPATIENT)
Dept: ANTICOAGULATION | Facility: CLINIC | Age: 70
End: 2025-08-21

## 2025-08-21 DIAGNOSIS — I48.0 PAROXYSMAL ATRIAL FIBRILLATION (H): Primary | ICD-10-CM

## 2025-08-21 LAB
BACTERIA SPEC CULT: NORMAL
BACTERIA SPEC CULT: NORMAL

## 2025-08-21 ASSESSMENT — ACTIVITIES OF DAILY LIVING (ADL)
ADLS_ACUITY_SCORE: 22
ADLS_ACUITY_SCORE: 27
ADLS_ACUITY_SCORE: 27
ADLS_ACUITY_SCORE: 22
ADLS_ACUITY_SCORE: 22
ADLS_ACUITY_SCORE: 27
ADLS_ACUITY_SCORE: 22
ADLS_ACUITY_SCORE: 22
ADLS_ACUITY_SCORE: 29
ADLS_ACUITY_SCORE: 22

## 2025-08-25 LAB
BACTERIA SPEC CULT: NO GROWTH
BACTERIA SPEC CULT: NO GROWTH

## 2025-08-26 ENCOUNTER — LAB (OUTPATIENT)
Dept: LAB | Facility: CLINIC | Age: 70
End: 2025-08-26
Payer: COMMERCIAL

## 2025-08-26 ENCOUNTER — TELEPHONE (OUTPATIENT)
Dept: INTERNAL MEDICINE | Facility: CLINIC | Age: 70
End: 2025-08-26

## 2025-08-26 ENCOUNTER — ANTICOAGULATION THERAPY VISIT (OUTPATIENT)
Dept: ANTICOAGULATION | Facility: CLINIC | Age: 70
End: 2025-08-26

## 2025-08-26 ENCOUNTER — RESULTS FOLLOW-UP (OUTPATIENT)
Dept: ANTICOAGULATION | Facility: CLINIC | Age: 70
End: 2025-08-26

## 2025-08-26 DIAGNOSIS — I48.0 PAROXYSMAL ATRIAL FIBRILLATION (H): Primary | ICD-10-CM

## 2025-08-26 DIAGNOSIS — I48.0 PAROXYSMAL ATRIAL FIBRILLATION (H): ICD-10-CM

## 2025-08-26 LAB — INR BLD: 3 (ref 0.9–1.1)

## 2025-08-26 PROCEDURE — 36416 COLLJ CAPILLARY BLOOD SPEC: CPT

## 2025-08-26 PROCEDURE — 85610 PROTHROMBIN TIME: CPT
